# Patient Record
Sex: FEMALE | Race: WHITE | Employment: OTHER | URBAN - METROPOLITAN AREA
[De-identification: names, ages, dates, MRNs, and addresses within clinical notes are randomized per-mention and may not be internally consistent; named-entity substitution may affect disease eponyms.]

---

## 2020-07-16 ENCOUNTER — APPOINTMENT (OUTPATIENT)
Dept: GENERAL RADIOLOGY | Age: 70
DRG: 202 | End: 2020-07-16
Attending: EMERGENCY MEDICINE
Payer: MEDICARE

## 2020-07-16 ENCOUNTER — HOSPITAL ENCOUNTER (INPATIENT)
Age: 70
LOS: 4 days | Discharge: HOME | DRG: 202 | End: 2020-07-20
Attending: EMERGENCY MEDICINE | Admitting: INTERNAL MEDICINE
Payer: MEDICARE

## 2020-07-16 DIAGNOSIS — R05.9 COUGH: ICD-10-CM

## 2020-07-16 DIAGNOSIS — Z20.822 SUSPECTED COVID-19 VIRUS INFECTION: ICD-10-CM

## 2020-07-16 DIAGNOSIS — J18.9 COMMUNITY ACQUIRED PNEUMONIA, UNSPECIFIED LATERALITY: Primary | ICD-10-CM

## 2020-07-16 PROBLEM — R09.02 HYPOXIA: Status: ACTIVE | Noted: 2020-07-16

## 2020-07-16 LAB
ALBUMIN SERPL-MCNC: 3.7 G/DL (ref 3.5–5)
ALBUMIN/GLOB SERPL: 0.8 {RATIO} (ref 1.1–2.2)
ALP SERPL-CCNC: 106 U/L (ref 45–117)
ALT SERPL-CCNC: 44 U/L (ref 12–78)
ANION GAP SERPL CALC-SCNC: 7 MMOL/L (ref 5–15)
AST SERPL-CCNC: 25 U/L (ref 15–37)
B PERT DNA SPEC QL NAA+PROBE: NOT DETECTED
BASOPHILS # BLD: 0 K/UL (ref 0–0.1)
BASOPHILS NFR BLD: 0 % (ref 0–1)
BILIRUB SERPL-MCNC: 1.3 MG/DL (ref 0.2–1)
BORDETELLA PARAPERTUSSIS PCR, BORPAR: NOT DETECTED
BUN SERPL-MCNC: 9 MG/DL (ref 6–20)
BUN/CREAT SERPL: 12 (ref 12–20)
C PNEUM DNA SPEC QL NAA+PROBE: NOT DETECTED
CALCIUM SERPL-MCNC: 9 MG/DL (ref 8.5–10.1)
CHLORIDE SERPL-SCNC: 103 MMOL/L (ref 97–108)
CO2 SERPL-SCNC: 28 MMOL/L (ref 21–32)
COVID-19 RAPID TEST, COVR: NOT DETECTED
CREAT SERPL-MCNC: 0.77 MG/DL (ref 0.55–1.02)
CRP SERPL-MCNC: 18.7 MG/DL (ref 0–0.6)
D DIMER PPP FEU-MCNC: 0.55 MG/L FEU (ref 0–0.65)
DIFFERENTIAL METHOD BLD: ABNORMAL
EOSINOPHIL # BLD: 0.3 K/UL (ref 0–0.4)
EOSINOPHIL NFR BLD: 3 % (ref 0–7)
ERYTHROCYTE [DISTWIDTH] IN BLOOD BY AUTOMATED COUNT: 14.5 % (ref 11.5–14.5)
FERRITIN SERPL-MCNC: 189 NG/ML (ref 26–388)
FIBRINOGEN PPP-MCNC: 703 MG/DL (ref 200–475)
FLUAV H1 2009 PAND RNA SPEC QL NAA+PROBE: NOT DETECTED
FLUAV H1 RNA SPEC QL NAA+PROBE: NOT DETECTED
FLUAV H3 RNA SPEC QL NAA+PROBE: NOT DETECTED
FLUAV SUBTYP SPEC NAA+PROBE: NOT DETECTED
FLUBV RNA SPEC QL NAA+PROBE: NOT DETECTED
GLOBULIN SER CALC-MCNC: 4.9 G/DL (ref 2–4)
GLUCOSE BLD STRIP.AUTO-MCNC: 191 MG/DL (ref 65–100)
GLUCOSE SERPL-MCNC: 121 MG/DL (ref 65–100)
HADV DNA SPEC QL NAA+PROBE: NOT DETECTED
HCOV 229E RNA SPEC QL NAA+PROBE: NOT DETECTED
HCOV HKU1 RNA SPEC QL NAA+PROBE: NOT DETECTED
HCOV NL63 RNA SPEC QL NAA+PROBE: NOT DETECTED
HCOV OC43 RNA SPEC QL NAA+PROBE: NOT DETECTED
HCT VFR BLD AUTO: 36.7 % (ref 35–47)
HGB BLD-MCNC: 12.2 G/DL (ref 11.5–16)
HMPV RNA SPEC QL NAA+PROBE: NOT DETECTED
HPIV1 RNA SPEC QL NAA+PROBE: NOT DETECTED
HPIV2 RNA SPEC QL NAA+PROBE: NOT DETECTED
HPIV3 RNA SPEC QL NAA+PROBE: NOT DETECTED
HPIV4 RNA SPEC QL NAA+PROBE: NOT DETECTED
IMM GRANULOCYTES # BLD AUTO: 0.1 K/UL (ref 0–0.04)
IMM GRANULOCYTES NFR BLD AUTO: 1 % (ref 0–0.5)
INR PPP: 1.2 (ref 0.9–1.1)
LACTATE SERPL-SCNC: 1.1 MMOL/L (ref 0.4–2)
LDH SERPL L TO P-CCNC: 225 U/L (ref 81–246)
LYMPHOCYTES # BLD: 1.4 K/UL (ref 0.8–3.5)
LYMPHOCYTES NFR BLD: 14 % (ref 12–49)
M PNEUMO DNA SPEC QL NAA+PROBE: NOT DETECTED
MAGNESIUM SERPL-MCNC: 2.2 MG/DL (ref 1.6–2.4)
MCH RBC QN AUTO: 33.6 PG (ref 26–34)
MCHC RBC AUTO-ENTMCNC: 33.2 G/DL (ref 30–36.5)
MCV RBC AUTO: 101.1 FL (ref 80–99)
MONOCYTES # BLD: 0.6 K/UL (ref 0–1)
MONOCYTES NFR BLD: 7 % (ref 5–13)
NEUTS SEG # BLD: 7.3 K/UL (ref 1.8–8)
NEUTS SEG NFR BLD: 75 % (ref 32–75)
NRBC # BLD: 0 K/UL (ref 0–0.01)
NRBC BLD-RTO: 0 PER 100 WBC
PLATELET # BLD AUTO: 312 K/UL (ref 150–400)
PMV BLD AUTO: 9.9 FL (ref 8.9–12.9)
POTASSIUM SERPL-SCNC: 3.6 MMOL/L (ref 3.5–5.1)
PROCALCITONIN SERPL-MCNC: 0.13 NG/ML
PROT SERPL-MCNC: 8.6 G/DL (ref 6.4–8.2)
PROTHROMBIN TIME: 12.5 SEC (ref 9–11.1)
RBC # BLD AUTO: 3.63 M/UL (ref 3.8–5.2)
RSV RNA SPEC QL NAA+PROBE: NOT DETECTED
RV+EV RNA SPEC QL NAA+PROBE: DETECTED
SARS-COV-2, COV2: NOT DETECTED
SERVICE CMNT-IMP: ABNORMAL
SODIUM SERPL-SCNC: 138 MMOL/L (ref 136–145)
SOURCE, COVRS: NORMAL
SPECIMEN SOURCE, FCOV2M: NORMAL
SPECIMEN SOURCE, FCOV2M: NORMAL
TROPONIN I SERPL-MCNC: <0.05 NG/ML
WBC # BLD AUTO: 9.7 K/UL (ref 3.6–11)

## 2020-07-16 PROCEDURE — 74011000258 HC RX REV CODE- 258: Performed by: EMERGENCY MEDICINE

## 2020-07-16 PROCEDURE — 94640 AIRWAY INHALATION TREATMENT: CPT

## 2020-07-16 PROCEDURE — 85610 PROTHROMBIN TIME: CPT

## 2020-07-16 PROCEDURE — 87635 SARS-COV-2 COVID-19 AMP PRB: CPT

## 2020-07-16 PROCEDURE — 99285 EMERGENCY DEPT VISIT HI MDM: CPT

## 2020-07-16 PROCEDURE — 87040 BLOOD CULTURE FOR BACTERIA: CPT

## 2020-07-16 PROCEDURE — 36415 COLL VENOUS BLD VENIPUNCTURE: CPT

## 2020-07-16 PROCEDURE — 82962 GLUCOSE BLOOD TEST: CPT

## 2020-07-16 PROCEDURE — 0100U RESPIRATORY PANEL,PCR,NASOPHARYNGEAL: CPT

## 2020-07-16 PROCEDURE — 74011250637 HC RX REV CODE- 250/637: Performed by: EMERGENCY MEDICINE

## 2020-07-16 PROCEDURE — 84484 ASSAY OF TROPONIN QUANT: CPT

## 2020-07-16 PROCEDURE — 96375 TX/PRO/DX INJ NEW DRUG ADDON: CPT

## 2020-07-16 PROCEDURE — 85025 COMPLETE CBC W/AUTO DIFF WBC: CPT

## 2020-07-16 PROCEDURE — 83735 ASSAY OF MAGNESIUM: CPT

## 2020-07-16 PROCEDURE — 74011250636 HC RX REV CODE- 250/636: Performed by: EMERGENCY MEDICINE

## 2020-07-16 PROCEDURE — 83605 ASSAY OF LACTIC ACID: CPT

## 2020-07-16 PROCEDURE — 85379 FIBRIN DEGRADATION QUANT: CPT

## 2020-07-16 PROCEDURE — 65270000029 HC RM PRIVATE

## 2020-07-16 PROCEDURE — 96365 THER/PROPH/DIAG IV INF INIT: CPT

## 2020-07-16 PROCEDURE — 84145 PROCALCITONIN (PCT): CPT

## 2020-07-16 PROCEDURE — 96366 THER/PROPH/DIAG IV INF ADDON: CPT

## 2020-07-16 PROCEDURE — 82728 ASSAY OF FERRITIN: CPT

## 2020-07-16 PROCEDURE — 83615 LACTATE (LD) (LDH) ENZYME: CPT

## 2020-07-16 PROCEDURE — 85384 FIBRINOGEN ACTIVITY: CPT

## 2020-07-16 PROCEDURE — 74011250637 HC RX REV CODE- 250/637: Performed by: INTERNAL MEDICINE

## 2020-07-16 PROCEDURE — 71045 X-RAY EXAM CHEST 1 VIEW: CPT

## 2020-07-16 PROCEDURE — 80053 COMPREHEN METABOLIC PANEL: CPT

## 2020-07-16 PROCEDURE — 93005 ELECTROCARDIOGRAM TRACING: CPT

## 2020-07-16 PROCEDURE — 86140 C-REACTIVE PROTEIN: CPT

## 2020-07-16 PROCEDURE — 74011250636 HC RX REV CODE- 250/636: Performed by: INTERNAL MEDICINE

## 2020-07-16 RX ORDER — VALSARTAN 80 MG/1
80 TABLET ORAL DAILY
COMMUNITY

## 2020-07-16 RX ORDER — ACETAMINOPHEN 325 MG/1
650 TABLET ORAL
Status: DISCONTINUED | OUTPATIENT
Start: 2020-07-16 | End: 2020-07-20 | Stop reason: HOSPADM

## 2020-07-16 RX ORDER — ONDANSETRON 2 MG/ML
4 INJECTION INTRAMUSCULAR; INTRAVENOUS
Status: DISCONTINUED | OUTPATIENT
Start: 2020-07-16 | End: 2020-07-20 | Stop reason: HOSPADM

## 2020-07-16 RX ORDER — ENOXAPARIN SODIUM 100 MG/ML
40 INJECTION SUBCUTANEOUS EVERY 24 HOURS
Status: DISCONTINUED | OUTPATIENT
Start: 2020-07-17 | End: 2020-07-20 | Stop reason: HOSPADM

## 2020-07-16 RX ORDER — MAGNESIUM SULFATE 100 %
4 CRYSTALS MISCELLANEOUS AS NEEDED
Status: DISCONTINUED | OUTPATIENT
Start: 2020-07-16 | End: 2020-07-20 | Stop reason: HOSPADM

## 2020-07-16 RX ORDER — SODIUM CHLORIDE 0.9 % (FLUSH) 0.9 %
5-40 SYRINGE (ML) INJECTION EVERY 8 HOURS
Status: DISCONTINUED | OUTPATIENT
Start: 2020-07-16 | End: 2020-07-20 | Stop reason: HOSPADM

## 2020-07-16 RX ORDER — VALSARTAN 80 MG/1
80 TABLET ORAL DAILY
Status: DISCONTINUED | OUTPATIENT
Start: 2020-07-17 | End: 2020-07-16

## 2020-07-16 RX ORDER — OXYBUTYNIN CHLORIDE 5 MG/1
5 TABLET, EXTENDED RELEASE ORAL DAILY
COMMUNITY

## 2020-07-16 RX ORDER — AZITHROMYCIN 250 MG/1
250 TABLET, FILM COATED ORAL DAILY
Status: DISCONTINUED | OUTPATIENT
Start: 2020-07-17 | End: 2020-07-16

## 2020-07-16 RX ORDER — VENLAFAXINE HYDROCHLORIDE 75 MG/1
75 CAPSULE, EXTENDED RELEASE ORAL DAILY
COMMUNITY

## 2020-07-16 RX ORDER — AZITHROMYCIN 250 MG/1
500 TABLET, FILM COATED ORAL
Status: COMPLETED | OUTPATIENT
Start: 2020-07-16 | End: 2020-07-16

## 2020-07-16 RX ORDER — ARIPIPRAZOLE 5 MG/1
10 TABLET ORAL DAILY
Status: DISCONTINUED | OUTPATIENT
Start: 2020-07-17 | End: 2020-07-20 | Stop reason: HOSPADM

## 2020-07-16 RX ORDER — SODIUM CHLORIDE 0.9 % (FLUSH) 0.9 %
5-40 SYRINGE (ML) INJECTION AS NEEDED
Status: DISCONTINUED | OUTPATIENT
Start: 2020-07-16 | End: 2020-07-20 | Stop reason: HOSPADM

## 2020-07-16 RX ORDER — CITALOPRAM 20 MG/1
20 TABLET, FILM COATED ORAL DAILY
COMMUNITY

## 2020-07-16 RX ORDER — VENLAFAXINE HYDROCHLORIDE 37.5 MG/1
75 CAPSULE, EXTENDED RELEASE ORAL
Status: DISCONTINUED | OUTPATIENT
Start: 2020-07-17 | End: 2020-07-20 | Stop reason: HOSPADM

## 2020-07-16 RX ORDER — DEXAMETHASONE 4 MG/1
6 TABLET ORAL DAILY
Status: DISCONTINUED | OUTPATIENT
Start: 2020-07-16 | End: 2020-07-19

## 2020-07-16 RX ORDER — LEVOTHYROXINE SODIUM 75 UG/1
75 TABLET ORAL
Status: DISCONTINUED | OUTPATIENT
Start: 2020-07-17 | End: 2020-07-20 | Stop reason: HOSPADM

## 2020-07-16 RX ORDER — CITALOPRAM 20 MG/1
20 TABLET, FILM COATED ORAL DAILY
Status: DISCONTINUED | OUTPATIENT
Start: 2020-07-17 | End: 2020-07-20 | Stop reason: HOSPADM

## 2020-07-16 RX ORDER — ZINC SULFATE 50(220)MG
220 CAPSULE ORAL DAILY
Status: DISCONTINUED | OUTPATIENT
Start: 2020-07-17 | End: 2020-07-19

## 2020-07-16 RX ORDER — LOSARTAN POTASSIUM 50 MG/1
50 TABLET ORAL DAILY
Status: DISCONTINUED | OUTPATIENT
Start: 2020-07-17 | End: 2020-07-19

## 2020-07-16 RX ORDER — DEXTROSE 50 % IN WATER (D50W) INTRAVENOUS SYRINGE
12.5-25 AS NEEDED
Status: DISCONTINUED | OUTPATIENT
Start: 2020-07-16 | End: 2020-07-20 | Stop reason: HOSPADM

## 2020-07-16 RX ORDER — OXYBUTYNIN CHLORIDE 5 MG/1
5 TABLET, EXTENDED RELEASE ORAL DAILY
Status: DISCONTINUED | OUTPATIENT
Start: 2020-07-17 | End: 2020-07-20 | Stop reason: HOSPADM

## 2020-07-16 RX ORDER — METFORMIN HYDROCHLORIDE 500 MG/1
500 TABLET ORAL
Status: DISCONTINUED | OUTPATIENT
Start: 2020-07-17 | End: 2020-07-19

## 2020-07-16 RX ORDER — ONDANSETRON 2 MG/ML
4 INJECTION INTRAMUSCULAR; INTRAVENOUS ONCE
Status: COMPLETED | OUTPATIENT
Start: 2020-07-16 | End: 2020-07-16

## 2020-07-16 RX ORDER — AZITHROMYCIN 250 MG/1
250 TABLET, FILM COATED ORAL DAILY
Status: COMPLETED | OUTPATIENT
Start: 2020-07-17 | End: 2020-07-20

## 2020-07-16 RX ORDER — METHOCARBAMOL 500 MG/1
TABLET, FILM COATED ORAL 3 TIMES DAILY
COMMUNITY

## 2020-07-16 RX ORDER — ALBUTEROL SULFATE 90 UG/1
8 AEROSOL, METERED RESPIRATORY (INHALATION)
Status: COMPLETED | OUTPATIENT
Start: 2020-07-16 | End: 2020-07-17

## 2020-07-16 RX ORDER — METFORMIN HYDROCHLORIDE 500 MG/1
500 TABLET ORAL
COMMUNITY

## 2020-07-16 RX ORDER — LEVOTHYROXINE SODIUM 75 UG/1
75 TABLET ORAL
COMMUNITY

## 2020-07-16 RX ORDER — INSULIN LISPRO 100 [IU]/ML
INJECTION, SOLUTION INTRAVENOUS; SUBCUTANEOUS
Status: DISCONTINUED | OUTPATIENT
Start: 2020-07-16 | End: 2020-07-20 | Stop reason: HOSPADM

## 2020-07-16 RX ORDER — OXCARBAZEPINE 300 MG/1
300 TABLET, FILM COATED ORAL 2 TIMES DAILY
Status: DISCONTINUED | OUTPATIENT
Start: 2020-07-16 | End: 2020-07-20 | Stop reason: HOSPADM

## 2020-07-16 RX ORDER — ARIPIPRAZOLE 10 MG/1
10 TABLET ORAL DAILY
COMMUNITY

## 2020-07-16 RX ORDER — OXCARBAZEPINE 300 MG/1
300 TABLET, FILM COATED ORAL 2 TIMES DAILY
COMMUNITY

## 2020-07-16 RX ORDER — ASCORBIC ACID 500 MG
250 TABLET ORAL 2 TIMES DAILY
Status: DISCONTINUED | OUTPATIENT
Start: 2020-07-16 | End: 2020-07-19

## 2020-07-16 RX ORDER — METHOCARBAMOL 500 MG/1
500 TABLET, FILM COATED ORAL 3 TIMES DAILY
Status: DISCONTINUED | OUTPATIENT
Start: 2020-07-16 | End: 2020-07-20 | Stop reason: HOSPADM

## 2020-07-16 RX ADMIN — Medication 10 ML: at 18:14

## 2020-07-16 RX ADMIN — SODIUM CHLORIDE 1000 ML: 900 INJECTION, SOLUTION INTRAVENOUS at 13:18

## 2020-07-16 RX ADMIN — CEFTRIAXONE SODIUM 1 G: 1 INJECTION, POWDER, FOR SOLUTION INTRAMUSCULAR; INTRAVENOUS at 14:22

## 2020-07-16 RX ADMIN — AZITHROMYCIN MONOHYDRATE 500 MG: 250 TABLET ORAL at 14:22

## 2020-07-16 RX ADMIN — METHOCARBAMOL TABLETS 500 MG: 500 TABLET, COATED ORAL at 22:24

## 2020-07-16 RX ADMIN — DEXAMETHASONE 6 MG: 4 TABLET ORAL at 18:13

## 2020-07-16 RX ADMIN — OXCARBAZEPINE 300 MG: 300 TABLET, FILM COATED ORAL at 18:14

## 2020-07-16 RX ADMIN — Medication 10 ML: at 22:24

## 2020-07-16 RX ADMIN — ONDANSETRON 4 MG: 2 INJECTION INTRAMUSCULAR; INTRAVENOUS at 13:18

## 2020-07-16 RX ADMIN — ALBUTEROL SULFATE 8 PUFF: 90 AEROSOL, METERED RESPIRATORY (INHALATION) at 12:54

## 2020-07-16 RX ADMIN — OXYCODONE HYDROCHLORIDE AND ACETAMINOPHEN 250 MG: 500 TABLET ORAL at 18:14

## 2020-07-16 NOTE — ACP (ADVANCE CARE PLANNING)
Advance Care Planning Note      NAME: Shanta Rea   :  1950   MRN:  [id number]     Date/Time:  2020 5:28 PM    Active Diagnoses:  Hospital Problems  Never Reviewed          Codes Class Noted POA    Hypoxia ICD-10-CM: R09.02  ICD-9-CM: 799.02  2020 Unknown              These active diagnoses are of sufficient risk that focused discussion on advance care planning is indicated in order to allow the patient to thoughtfully consider personal goals of care, and if situations arise that prevent the ability to personally give input, to ensure appropriate representation of their personal desires for different levels and aggressiveness of care. Discussion:   Code status addressed and wants to be a Full Code. Patient wants central line and vasopressors if needed. Patient would also want a feeding tube, if needed, for nutritional support. Patient  would like to assign Elder Thomas  as the surrogate decision maker. Persons present and participating in discussion: Jesus Mcdaniels MD      Time Spent:   Total time spent face-to-face in education and discussion:   16  minutes.          Jesus Edwards MD   Hospitalist

## 2020-07-16 NOTE — ED NOTES
Bedside and Verbal shift change report given to Clemente Bland RN (oncoming nurse) by Elaina Ortiz RN (offgoing nurse). Report included the following information SBAR, ED Summary, MAR and Recent Results.

## 2020-07-16 NOTE — ED NOTES
PT desat down to 85% after receiving 500 cc of 1000cc bolus.   Dr. Ilana Adamson notified, pt started on 2L NC, bolus stopped, return to 95% saturation on 2L NC.

## 2020-07-16 NOTE — ED PROVIDER NOTES
EMERGENCY DEPARTMENT HISTORY AND PHYSICAL EXAM      Date: 7/16/2020  Patient Name: Julio Rosales    History of Presenting Illness     Chief Complaint   Patient presents with    Shortness of Breath     Pt arrived to ED for SOB, dry heaving, coughing since 7/4/2020. Pt traveled to [facility].  Fever       History Provided By: Patient    HPI: Julio Rosales, 78 y.o. female with PMHx significant for DM, HTN, XOL, and h/o asthma as a child presents to the ED with cc of dyspnea, cough and fever. Pt states that she traveled to [place] of July weekend with her family. They stayed in a condo and kept to themselves other than going to the grocery store a few times. Pt wore her mask to the grocery store, stating that many others were not wearing masks. She noted on July 6th that she developed some rhinorrhea, followed by a sore throat and a fever. Tmax recently only about 100. The symptoms then moved into her chest, she developed some coughing (non productive) and progressibely worsening dyspnea to the point she sought medical attention today. She denies anyone else in the family being sick at this time and no close contact with a KNOWN individual prior diagnosis of COVID-19. No other complaints at this time. There are no other complaints, changes, or physical findings at this time. PCP: Unknown, Provider    No current facility-administered medications on file prior to encounter. Current Outpatient Medications on File Prior to Encounter   Medication Sig Dispense Refill    methocarbamoL (ROBAXIN) 500 mg tablet Take  by mouth three (3) times daily.  ARIPiprazole (ABILIFY) 10 mg tablet Take 10 mg by mouth daily.  metFORMIN (GLUCOPHAGE) 500 mg tablet Take 500 mg by mouth daily (with breakfast).  OXcarbazepine (TRILEPTAL) 300 mg tablet Take 300 mg by mouth two (2) times a day.  venlafaxine-SR (EFFEXOR-XR) 75 mg capsule Take 75 mg by mouth daily.       levothyroxine (SYNTHROID) 75 mcg tablet Take 75 mcg by mouth Daily (before breakfast).  citalopram (CELEXA) 20 mg tablet Take 20 mg by mouth daily.  valsartan (DIOVAN) 80 mg tablet Take 80 mg by mouth daily.  oxybutynin chloride XL (DITROPAN XL) 5 mg CR tablet Take 5 mg by mouth daily. Past History     Past Medical History:  Past Medical History:   Diagnosis Date    Asthma     Bipolar 1 disorder ([address]     Pneumonia        Past Surgical History:  Past Surgical History:   Procedure Laterality Date    HX ORTHOPAEDIC         Family History:  History reviewed. No pertinent family history. Social History:  Social History     Tobacco Use    Smoking status: Never Smoker    Smokeless tobacco: Never Used   Substance Use Topics    Alcohol use: Not on file    Drug use: Not on file       Allergies: Allergies   Allergen Reactions    Nexium [Esomeprazole Magnesium] Other (comments)         Review of Systems   Review of Systems   Constitutional: Positive for fever. Negative for chills. HENT: Positive for postnasal drip, rhinorrhea and sore throat. Negative for nosebleeds. Eyes: Negative for pain and visual disturbance. Respiratory: Positive for cough, shortness of breath and wheezing. Cardiovascular: Negative for chest pain, palpitations and leg swelling. Gastrointestinal: Positive for nausea. Negative for abdominal pain, diarrhea and vomiting. Genitourinary: Negative for dysuria and hematuria. Musculoskeletal: Negative for back pain and neck pain. Skin: Negative for color change and rash. Neurological: Negative for weakness and headaches. Psychiatric/Behavioral: Negative for suicidal ideas. The patient is not nervous/anxious. All other systems reviewed and are negative. Physical Exam   Physical Exam  Vitals signs and nursing note reviewed. Constitutional:       Appearance: Normal appearance. HENT:      Head: Normocephalic and atraumatic.       Nose: Nose normal.      Mouth/Throat:      Mouth: Mucous membranes are moist.      Pharynx: Oropharynx is clear. Eyes:      Extraocular Movements: Extraocular movements intact. Pupils: Pupils are equal, round, and reactive to light. Neck:      Musculoskeletal: Normal range of motion and neck supple. No neck rigidity. Cardiovascular:      Rate and Rhythm: Normal rate and regular rhythm. Heart sounds: Normal heart sounds. Pulmonary:      Effort: Tachypnea and respiratory distress (mild) present. Breath sounds: Examination of the right-lower field reveals wheezing and rales. Examination of the left-lower field reveals wheezing and rales. Wheezing and rales present. Chest:      Chest wall: No edema. Abdominal:      Palpations: Abdomen is soft. Tenderness: There is no abdominal tenderness. There is no guarding or rebound. Musculoskeletal:         General: No deformity. Skin:     General: Skin is warm and dry. Findings: No rash. Neurological:      General: No focal deficit present. Mental Status: She is alert and oriented to person, place, and time.    Psychiatric:         Mood and Affect: Mood normal.         Behavior: Behavior normal.         Diagnostic Study Results     Labs -     Recent Results (from the past 24 hour(s))   EKG, 12 LEAD, INITIAL    Collection Time: 07/16/20 12:11 PM   Result Value Ref Range    Ventricular Rate 87 BPM    Atrial Rate 87 BPM    P-R Interval 164 ms    QRS Duration 88 ms    Q-T Interval 368 ms    QTC Calculation (Bezet) 442 ms    Calculated P Axis 7 degrees    Calculated R Axis 7 degrees    Calculated T Axis 74 degrees    Diagnosis       Normal sinus rhythm  Normal ECG  No previous ECGs available     CBC WITH AUTOMATED DIFF    Collection Time: 07/16/20 12:25 PM   Result Value Ref Range    WBC 9.7 3.6 - 11.0 K/uL    RBC 3.63 (L) 3.80 - 5.20 M/uL    HGB 12.2 11.5 - 16.0 g/dL    HCT 36.7 35.0 - 47.0 %    .1 (H) 80.0 - 99.0 FL    MCH 33.6 26.0 - 34.0 PG    MCHC 33.2 30.0 - 36.5 g/dL    RDW 14.5 11.5 - 14.5 % PLATELET 100 387 - 070 K/uL    MPV 9.9 8.9 - 12.9 FL    NRBC 0.0 0  WBC    ABSOLUTE NRBC 0.00 0.00 - 0.01 K/uL    NEUTROPHILS 75 32 - 75 %    LYMPHOCYTES 14 12 - 49 %    MONOCYTES 7 5 - 13 %    EOSINOPHILS 3 0 - 7 %    BASOPHILS 0 0 - 1 %    IMMATURE GRANULOCYTES 1 (H) 0.0 - 0.5 %    ABS. NEUTROPHILS 7.3 1.8 - 8.0 K/UL    ABS. LYMPHOCYTES 1.4 0.8 - 3.5 K/UL    ABS. MONOCYTES 0.6 0.0 - 1.0 K/UL    ABS. EOSINOPHILS 0.3 0.0 - 0.4 K/UL    ABS. BASOPHILS 0.0 0.0 - 0.1 K/UL    ABS. IMM.  GRANS. 0.1 (H) 0.00 - 0.04 K/UL    DF AUTOMATED     FERRITIN    Collection Time: 07/16/20 12:25 PM   Result Value Ref Range    Ferritin 189 26 - 388 NG/ML   LACTIC ACID    Collection Time: 07/16/20 12:25 PM   Result Value Ref Range    Lactic acid 1.1 0.4 - 2.0 MMOL/L   D DIMER    Collection Time: 07/16/20 12:25 PM   Result Value Ref Range    D-dimer 0.55 0.00 - 0.65 mg/L FEU   PROTHROMBIN TIME + INR    Collection Time: 07/16/20 12:25 PM   Result Value Ref Range    INR 1.2 (H) 0.9 - 1.1      Prothrombin time 12.5 (H) 9.0 - 11.1 sec   FIBRINOGEN    Collection Time: 07/16/20 12:25 PM   Result Value Ref Range    Fibrinogen 703 (H) 200 - 475 mg/dL   LD    Collection Time: 07/16/20 12:27 PM   Result Value Ref Range     81 - 246 U/L   C REACTIVE PROTEIN, QT    Collection Time: 07/16/20 12:27 PM   Result Value Ref Range    C-Reactive protein 18.70 (H) 0.00 - 0.60 mg/dL   PROCALCITONIN    Collection Time: 07/16/20 12:27 PM   Result Value Ref Range    Procalcitonin 0.13 ng/mL   TROPONIN I    Collection Time: 07/16/20 12:27 PM   Result Value Ref Range    Troponin-I, Qt. <0.05 <0.05 ng/mL   MAGNESIUM    Collection Time: 07/16/20 12:27 PM   Result Value Ref Range    Magnesium 2.2 1.6 - 2.4 mg/dL   METABOLIC PANEL, COMPREHENSIVE    Collection Time: 07/16/20 12:27 PM   Result Value Ref Range    Sodium 138 136 - 145 mmol/L    Potassium 3.6 3.5 - 5.1 mmol/L    Chloride 103 97 - 108 mmol/L    CO2 28 21 - 32 mmol/L    Anion gap 7 5 - 15 mmol/L Glucose 121 (H) 65 - 100 mg/dL    BUN 9 6 - 20 MG/DL    Creatinine 0.77 0.55 - 1.02 MG/DL    BUN/Creatinine ratio 12 12 - 20      GFR est AA >60 >60 ml/min/1.73m2    GFR est non-AA >60 >60 ml/min/1.73m2    Calcium 9.0 8.5 - 10.1 MG/DL    Bilirubin, total 1.3 (H) 0.2 - 1.0 MG/DL    ALT (SGPT) 44 12 - 78 U/L    AST (SGOT) 25 15 - 37 U/L    Alk. phosphatase 106 45 - 117 U/L    Protein, total 8.6 (H) 6.4 - 8.2 g/dL    Albumin 3.7 3.5 - 5.0 g/dL    Globulin 4.9 (H) 2.0 - 4.0 g/dL    A-G Ratio 0.8 (L) 1.1 - 2.2     SARS-COV-2    Collection Time: 07/16/20  2:32 PM   Result Value Ref Range    Specimen source Nasopharyngeal      Specimen source Nasopharyngeal      COVID-19 rapid test Not detected NOTD         Radiologic Studies -   XR CHEST PORT   Final Result   IMPRESSION:      Possible interstitial lung disease. In the acute setting, consider edema or   interstitial pneumonia. No classic pattern of a specific pneumonia. Recommend followup PA and lateral chest views in 8-10 weeks to ensure   resolution. CT Results  (Last 48 hours)    None        CXR Results  (Last 48 hours)               07/16/20 1327  XR CHEST PORT Final result    Impression:  IMPRESSION:       Possible interstitial lung disease. In the acute setting, consider edema or   interstitial pneumonia. No classic pattern of a specific pneumonia. Recommend followup PA and lateral chest views in 8-10 weeks to ensure   resolution. Narrative:  EXAM: XR CHEST PORT       INDICATION: Cough. Chronic asthma. Normal white blood cell count. COMPARISON: None       TECHNIQUE: Upright portable chest AP view       FINDINGS: Cardiac monitoring wires overlie the thorax. The cardiomediastinal and   hilar contours are within normal limits. The pulmonary vasculature is within   normal limits. Reticular interstitial opacities may be accentuated by portable technique and   low lung volumes. No lobar pneumonia. No pneumothorax.  The visualized bones and   upper abdomen are age-appropriate. Medical Decision Making   I am the first provider for this patient. I reviewed the vital signs, available nursing notes, past medical history, past surgical history, family history and social history. Vital Signs-Reviewed the patient's vital signs. Patient Vitals for the past 24 hrs:   Pulse Resp BP SpO2   07/16/20 1615 -- -- -- 97 %   07/16/20 1600 -- -- (!) 165/98 94 %   07/16/20 1545 -- -- -- 92 %   07/16/20 1530 -- -- 166/85 94 %   07/16/20 1515 -- -- -- 97 %   07/16/20 1500 -- -- 163/85 97 %   07/16/20 1445 -- -- -- 97 %   07/16/20 1430 -- -- 156/78 99 %   07/16/20 1415 -- -- -- 97 %   07/16/20 1400 -- -- (!) 156/93 98 %   07/16/20 1345 -- -- -- 95 %   07/16/20 1330 -- -- (!) 158/102 90 %   07/16/20 1315 -- -- -- 93 %   07/16/20 1300 -- -- 167/81 95 %   07/16/20 1254 -- -- -- 96 %   07/16/20 1245 -- -- -- 95 %   07/16/20 1230 -- -- 168/84 95 %   07/16/20 1215 -- -- 160/82 93 %   07/16/20 1200 -- -- 151/88 94 %   07/16/20 1145 -- -- 151/83 94 %   07/16/20 1130 -- -- 173/85 96 %   07/16/20 1121 99 20 148/82 97 %       Pulse Oximetry Analysis - 97% on RA    Cardiac Monitor:   Rate: 99 bpm  Rhythm: Normal Sinus Rhythm        Records Reviewed: Nursing Notes and Old Medical Records    Differential Diagnosis/MDM:    DDX includes COVID-19 infection versus pneumonia versus CHF versus PE versus acute viral illness with asthma exacerbation. Will obtain labs, blood cultures, chest x-ray. Will give albuterol inhaler, IV fluids. Patient not requiring oxygen. Will reassess pending work-up. ED Course:     Initial assessment performed. The patients presenting problems have been discussed, and they are in agreement with the care plan formulated and outlined with them. I have encouraged them to ask questions as they arise throughout their visit.     ED Course as of Jul 16 1756   Thu Jul 16, 2020   1337 Patient initially was with SPO2 of 93 upon arrival.  After starting one first liter fluid and receiving approximately 500 cc of fluid, patient is now satting 85% on room air. She was placed on oxygen and the fluid bolus was stopped. SpO2 improved. [SC]   5940 CXR concerning for possible interstitial pneumonia versus pulmonary edema. Patient clinically more consistent with pneumonia and possible infection with COVID-19 given history of recent travel to [place], fevers up to 100, rhinorrhea and sore throat prior to shortness of breath and coughing that began. COVID test is ordered, started on ceftriaxone and azithromycin. Patient does state that albuterol helped her coughing and breathing to some degree. Given acute hypoxic event will admit to hospitalist service for further management.    [SC]      ED Course User Index  [SC] Jann Yun MD       Critical Care Time:     None    Disposition:  Admit    PLAN:  1. Current Discharge Medication List        2. Follow-up Information    None       Return to ED if worse     Diagnosis     Clinical Impression:   1. Community acquired pneumonia, unspecified laterality    2.  Suspected COVID-19 virus infection

## 2020-07-16 NOTE — H&P
Hospitalist Admission Note    NAME: Kathy Cain   :  1950   MRN:  [id number]     Date/Time:  2020 5:18 PM    Patient PCP: Unknown, Provider  ______________________________________________________________________  Given the patient's current clinical presentation, I have a high level of concern for decompensation if discharged from the emergency department. Complex decision making was performed, which includes reviewing the patient's available past medical records, laboratory results, and x-ray films. My assessment of this patient's clinical condition and my plan of care is as follows. Assessment / Plan:  Interstitial pneumonia POA/rule out COVID-19:  -Recent travel to [place]  -1 week history of generalized aches and pains, intermittent diarrhea and respiratory difficulty  -Chest x-ray shows interstitial infiltrates suspicious of COVID-19 infection  -Admit to the medical floor droplet plus isolation  -Rapid COVID-19 test is negative  -We will obtain COVID-19 PCR testing  -Start ceftriaxone and azithromycin empirically for now as procalcitonin is elevated  -Start Decadron 6 mg daily as patient is requiring oxygen in the ED  -If COVID-19 PCR test comes back positive will obtain infectious disease consult for the treatment of COVID-19 with Remdesvir    Non-insulin-dependent diabetes:  -Continue metformin  -Use sliding scale insulin    Hypertension:  -Continue valsartan    Hypothyroidism;  -Continue levothyroxine      Bipolar disorder;  -Continue home medication      Code Status: Full code  Surrogate Decision Maker: Son at [facility]    DVT Prophylaxis: Lovenox  GI Prophylaxis: not indicated    Baseline: Independent      Subjective:   CHIEF COMPLAINT: Shortness of breath/malaise and diarrhea for 1 week  HISTORY OF PRESENT ILLNESS:     Kathy Cain is a 78 y.o.    female with past medical history significant for bipolar disorder, non-insulin-dependent diabetes, hypothyroidism and hypertension who presents with the complaints of generalized aches and pains, intermittent diarrhea and shortness of breath for 1 week. Patient reports she went to [place] on 4 July and returned on Saturday. She reports for the last 1 week she has been having the symptoms of generalized aches and pains with one episode of loose bowel movement every day and progressive shortness of breath. She denies any nausea or vomiting. Reports subjective fever and chills. Denies anybody else at home who is sick with similar symptoms. In the ED she had a chest x-ray done which showed interstitial infiltrates. She denies any orthopnea, PND but reports intermittent cough. She also denies any chest pain, urine urgency frequency dysuria, heat or cold intolerance    We were asked to admit for work up and evaluation of the above problems. Past Medical History:   Diagnosis Date    Asthma     Bipolar 1 disorder ([address]     Pneumonia         Past Surgical History:   Procedure Laterality Date    HX ORTHOPAEDIC         Social History     Tobacco Use    Smoking status: Never Smoker    Smokeless tobacco: Never Used   Substance Use Topics    Alcohol use: Not on file        History reviewed. No pertinent family history. Significant for heart disease in parents  Allergies   Allergen Reactions    Nexium [Esomeprazole Magnesium] Other (comments)        Prior to Admission medications    Medication Sig Start Date End Date Taking? Authorizing Provider   methocarbamoL (ROBAXIN) 500 mg tablet Take  by mouth three (3) times daily. Yes Inés, MD Agnieszka   ARIPiprazole (ABILIFY) 10 mg tablet Take 10 mg by mouth daily. Yes Other, MD Agnieszka   metFORMIN (GLUCOPHAGE) 500 mg tablet Take 500 mg by mouth daily (with breakfast). Yes Inés, MD Agnieszka   OXcarbazepine (TRILEPTAL) 300 mg tablet Take 300 mg by mouth two (2) times a day. Yes Other, MD Agnieszka   venlafaxine-SR [facility] 75 mg capsule Take 75 mg by mouth daily.    Yes Other, MD Agnieszka   levothyroxine (SYNTHROID) 75 mcg tablet Take 75 mcg by mouth Daily (before breakfast). Yes Other, MD Agnieszka   citalopram (CELEXA) 20 mg tablet Take 20 mg by mouth daily. Yes Inés, MD Agnieszka   valsartan (DIOVAN) 80 mg tablet Take 80 mg by mouth daily. Yes Inés, MD Agnieszka   oxybutynin chloride XL (DITROPAN XL) 5 mg CR tablet Take 5 mg by mouth daily. Yes Other, MD Agnieszka       REVIEW OF SYSTEMS:     I am not able to complete the review of systems because:    The patient is intubated and sedated    The patient has altered mental status due to his acute medical problems    The patient has baseline aphasia from prior stroke(s)    The patient has baseline dementia and is not reliable historian    The patient is in acute medical distress and unable to provide information           Total of 12 systems reviewed as follows:       POSITIVE= underlined text  Negative = text not underlined  General:  fever, chills, sweats, generalized weakness, weight loss/gain,      loss of appetite   Eyes:    blurred vision, eye pain, loss of vision, double vision  ENT:    rhinorrhea, pharyngitis   Respiratory:   cough, sputum production, SOB, STAFFORD, wheezing, pleuritic pain   Cardiology:   chest pain, palpitations, orthopnea, PND, edema, syncope   Gastrointestinal:  abdominal pain , N/V, diarrhea, dysphagia, constipation, bleeding   Genitourinary:  frequency, urgency, dysuria, hematuria, incontinence   Muskuloskeletal :  arthralgia, myalgia, back pain  Hematology:  easy bruising, nose or gum bleeding, lymphadenopathy   Dermatological: rash, ulceration, pruritis, color change / jaundice  Endocrine:   hot flashes or polydipsia   Neurological:  headache, dizziness, confusion, focal weakness, paresthesia,     Speech difficulties, memory loss, gait difficulty  Psychological: Feelings of anxiety, depression, agitation    Objective:   VITALS:    Visit Vitals  BP (!) 165/98   Pulse 99   Resp 20   Ht 5' 8\" (1.727 m)   Wt 117.9 kg (260 lb)   SpO2 97%   BMI 39.53 kg/m²       PHYSICAL EXAM:    General:    Alert, cooperative, no distress, appears stated age. HEENT: Atraumatic, anicteric sclerae, pink conjunctivae     No oral ulcers, mucosa moist, throat clear, dentition fair  Neck:  Supple, symmetrical,  thyroid: non tender  Lungs:   Clear to auscultation bilaterally. No Wheezing or Rhonchi. No rales. Chest wall:  No tenderness  No Accessory muscle use. Heart:   Regular  rhythm,  No  murmur   No edema  Abdomen:   Soft, non-tender. Not distended. Bowel sounds normal  Extremities: No cyanosis. No clubbing,      Skin turgor normal, Capillary refill normal, Radial dial pulse 2+  Skin:     Not pale. Not Jaundiced  No rashes   Psych:  Good insight. Not depressed. Not anxious or agitated. Neurologic: EOMs intact. No facial asymmetry. No aphasia or slurred speech. Symmetrical strength, Sensation grossly intact. Alert and oriented X 4.     _______________________________________________________________________  Care Plan discussed with:    Comments   Patient x    Family      RN     Care Manager                    Consultant:      _______________________________________________________________________  Expected  Disposition:   Home with Family x   HH/PT/OT/RN    SNF/LTC    RAHEEM    ________________________________________________________________________  TOTAL TIME:  45 Minutes    Critical Care Provided     Minutes non procedure based      Comments     Reviewed previous records   >50% of visit spent in counseling and coordination of care  Discussion with patient and/or family and questions answered       ________________________________________________________________________  Signed: Angelica Jaramillo MD    Procedures: see electronic medical records for all procedures/Xrays and details which were not copied into this note but were reviewed prior to creation of Plan.     LAB DATA REVIEWED:    Recent Results (from the past 24 hour(s))   EKG, 12 LEAD, INITIAL    Collection Time: 07/16/20 12:11 PM   Result Value Ref Range    Ventricular Rate 87 BPM    Atrial Rate 87 BPM    P-R Interval 164 ms    QRS Duration 88 ms    Q-T Interval 368 ms    QTC Calculation (Bezet) 442 ms    Calculated P Axis 7 degrees    Calculated R Axis 7 degrees    Calculated T Axis 74 degrees    Diagnosis       Normal sinus rhythm  Normal ECG  No previous ECGs available     CBC WITH AUTOMATED DIFF    Collection Time: 07/16/20 12:25 PM   Result Value Ref Range    WBC 9.7 3.6 - 11.0 K/uL    RBC 3.63 (L) 3.80 - 5.20 M/uL    HGB 12.2 11.5 - 16.0 g/dL    HCT 36.7 35.0 - 47.0 %    .1 (H) 80.0 - 99.0 FL    MCH 33.6 26.0 - 34.0 PG    MCHC 33.2 30.0 - 36.5 g/dL    RDW 14.5 11.5 - 14.5 %    PLATELET 633 588 - 520 K/uL    MPV 9.9 8.9 - 12.9 FL    NRBC 0.0 0  WBC    ABSOLUTE NRBC 0.00 0.00 - 0.01 K/uL    NEUTROPHILS 75 32 - 75 %    LYMPHOCYTES 14 12 - 49 %    MONOCYTES 7 5 - 13 %    EOSINOPHILS 3 0 - 7 %    BASOPHILS 0 0 - 1 %    IMMATURE GRANULOCYTES 1 (H) 0.0 - 0.5 %    ABS. NEUTROPHILS 7.3 1.8 - 8.0 K/UL    ABS. LYMPHOCYTES 1.4 0.8 - 3.5 K/UL    ABS. MONOCYTES 0.6 0.0 - 1.0 K/UL    ABS. EOSINOPHILS 0.3 0.0 - 0.4 K/UL    ABS. BASOPHILS 0.0 0.0 - 0.1 K/UL    ABS. IMM.  GRANS. 0.1 (H) 0.00 - 0.04 K/UL    DF AUTOMATED     FERRITIN    Collection Time: 07/16/20 12:25 PM   Result Value Ref Range    Ferritin 189 26 - 388 NG/ML   LACTIC ACID    Collection Time: 07/16/20 12:25 PM   Result Value Ref Range    Lactic acid 1.1 0.4 - 2.0 MMOL/L   D DIMER    Collection Time: 07/16/20 12:25 PM   Result Value Ref Range    D-dimer 0.55 0.00 - 0.65 mg/L FEU   PROTHROMBIN TIME + INR    Collection Time: 07/16/20 12:25 PM   Result Value Ref Range    INR 1.2 (H) 0.9 - 1.1      Prothrombin time 12.5 (H) 9.0 - 11.1 sec   FIBRINOGEN    Collection Time: 07/16/20 12:25 PM   Result Value Ref Range    Fibrinogen 703 (H) 200 - 475 mg/dL   LD    Collection Time: 07/16/20 12:27 PM   Result Value Ref Range     81 - 246 U/L   C REACTIVE PROTEIN, QT    Collection Time: 07/16/20 12:27 PM   Result Value Ref Range    C-Reactive protein 18.70 (H) 0.00 - 0.60 mg/dL   PROCALCITONIN    Collection Time: 07/16/20 12:27 PM   Result Value Ref Range    Procalcitonin 0.13 ng/mL   TROPONIN I    Collection Time: 07/16/20 12:27 PM   Result Value Ref Range    Troponin-I, Qt. <0.05 <0.05 ng/mL   MAGNESIUM    Collection Time: 07/16/20 12:27 PM   Result Value Ref Range    Magnesium 2.2 1.6 - 2.4 mg/dL   METABOLIC PANEL, COMPREHENSIVE    Collection Time: 07/16/20 12:27 PM   Result Value Ref Range    Sodium 138 136 - 145 mmol/L    Potassium 3.6 3.5 - 5.1 mmol/L    Chloride 103 97 - 108 mmol/L    CO2 28 21 - 32 mmol/L    Anion gap 7 5 - 15 mmol/L    Glucose 121 (H) 65 - 100 mg/dL    BUN 9 6 - 20 MG/DL    Creatinine 0.77 0.55 - 1.02 MG/DL    BUN/Creatinine ratio 12 12 - 20      GFR est AA >60 >60 ml/min/1.73m2    GFR est non-AA >60 >60 ml/min/1.73m2    Calcium 9.0 8.5 - 10.1 MG/DL    Bilirubin, total 1.3 (H) 0.2 - 1.0 MG/DL    ALT (SGPT) 44 12 - 78 U/L    AST (SGOT) 25 15 - 37 U/L    Alk.  phosphatase 106 45 - 117 U/L    Protein, total 8.6 (H) 6.4 - 8.2 g/dL    Albumin 3.7 3.5 - 5.0 g/dL    Globulin 4.9 (H) 2.0 - 4.0 g/dL    A-G Ratio 0.8 (L) 1.1 - 2.2     SARS-COV-2    Collection Time: 07/16/20  2:32 PM   Result Value Ref Range    Specimen source Nasopharyngeal      Specimen source Nasopharyngeal      COVID-19 rapid test Not detected NOTD

## 2020-07-17 LAB
ANION GAP SERPL CALC-SCNC: 6 MMOL/L (ref 5–15)
ATRIAL RATE: 87 BPM
BNP SERPL-MCNC: 585 PG/ML
BUN SERPL-MCNC: 13 MG/DL (ref 6–20)
BUN/CREAT SERPL: 16 (ref 12–20)
CALCIUM SERPL-MCNC: 9.2 MG/DL (ref 8.5–10.1)
CALCULATED P AXIS, ECG09: 7 DEGREES
CALCULATED R AXIS, ECG10: 7 DEGREES
CALCULATED T AXIS, ECG11: 74 DEGREES
CHLORIDE SERPL-SCNC: 104 MMOL/L (ref 97–108)
CO2 SERPL-SCNC: 27 MMOL/L (ref 21–32)
CREAT SERPL-MCNC: 0.8 MG/DL (ref 0.55–1.02)
D DIMER PPP FEU-MCNC: 0.61 MG/L FEU (ref 0–0.65)
DIAGNOSIS, 93000: NORMAL
ERYTHROCYTE [DISTWIDTH] IN BLOOD BY AUTOMATED COUNT: 14.5 % (ref 11.5–14.5)
EST. AVERAGE GLUCOSE BLD GHB EST-MCNC: 140 MG/DL
FERRITIN SERPL-MCNC: 187 NG/ML (ref 26–388)
FIBRINOGEN PPP-MCNC: 652 MG/DL (ref 200–475)
GLUCOSE BLD STRIP.AUTO-MCNC: 162 MG/DL (ref 65–100)
GLUCOSE BLD STRIP.AUTO-MCNC: 163 MG/DL (ref 65–100)
GLUCOSE BLD STRIP.AUTO-MCNC: 185 MG/DL (ref 65–100)
GLUCOSE BLD STRIP.AUTO-MCNC: 209 MG/DL (ref 65–100)
GLUCOSE SERPL-MCNC: 189 MG/DL (ref 65–100)
HBA1C MFR BLD: 6.5 % (ref 4–5.6)
HCT VFR BLD AUTO: 37 % (ref 35–47)
HGB BLD-MCNC: 11.9 G/DL (ref 11.5–16)
INR PPP: 1.2 (ref 0.9–1.1)
LDH SERPL L TO P-CCNC: 236 U/L (ref 81–246)
MCH RBC QN AUTO: 33.3 PG (ref 26–34)
MCHC RBC AUTO-ENTMCNC: 32.2 G/DL (ref 30–36.5)
MCV RBC AUTO: 103.6 FL (ref 80–99)
NRBC # BLD: 0 K/UL (ref 0–0.01)
NRBC BLD-RTO: 0 PER 100 WBC
P-R INTERVAL, ECG05: 164 MS
PLATELET # BLD AUTO: 322 K/UL (ref 150–400)
PMV BLD AUTO: 10 FL (ref 8.9–12.9)
POTASSIUM SERPL-SCNC: 4 MMOL/L (ref 3.5–5.1)
PROTHROMBIN TIME: 12.1 SEC (ref 9–11.1)
Q-T INTERVAL, ECG07: 368 MS
QRS DURATION, ECG06: 88 MS
QTC CALCULATION (BEZET), ECG08: 442 MS
RBC # BLD AUTO: 3.57 M/UL (ref 3.8–5.2)
SERVICE CMNT-IMP: ABNORMAL
SODIUM SERPL-SCNC: 137 MMOL/L (ref 136–145)
VENTRICULAR RATE, ECG03: 87 BPM
WBC # BLD AUTO: 8.9 K/UL (ref 3.6–11)

## 2020-07-17 PROCEDURE — 36415 COLL VENOUS BLD VENIPUNCTURE: CPT

## 2020-07-17 PROCEDURE — 74011250637 HC RX REV CODE- 250/637: Performed by: FAMILY MEDICINE

## 2020-07-17 PROCEDURE — 94640 AIRWAY INHALATION TREATMENT: CPT

## 2020-07-17 PROCEDURE — 74011250637 HC RX REV CODE- 250/637: Performed by: INTERNAL MEDICINE

## 2020-07-17 PROCEDURE — 80048 BASIC METABOLIC PNL TOTAL CA: CPT

## 2020-07-17 PROCEDURE — 74011250637 HC RX REV CODE- 250/637: Performed by: EMERGENCY MEDICINE

## 2020-07-17 PROCEDURE — 82728 ASSAY OF FERRITIN: CPT

## 2020-07-17 PROCEDURE — 85379 FIBRIN DEGRADATION QUANT: CPT

## 2020-07-17 PROCEDURE — 85027 COMPLETE CBC AUTOMATED: CPT

## 2020-07-17 PROCEDURE — 83880 ASSAY OF NATRIURETIC PEPTIDE: CPT

## 2020-07-17 PROCEDURE — 74011000258 HC RX REV CODE- 258: Performed by: INTERNAL MEDICINE

## 2020-07-17 PROCEDURE — 85610 PROTHROMBIN TIME: CPT

## 2020-07-17 PROCEDURE — 85384 FIBRINOGEN ACTIVITY: CPT

## 2020-07-17 PROCEDURE — 83036 HEMOGLOBIN GLYCOSYLATED A1C: CPT

## 2020-07-17 PROCEDURE — 77010033678 HC OXYGEN DAILY

## 2020-07-17 PROCEDURE — 82962 GLUCOSE BLOOD TEST: CPT

## 2020-07-17 PROCEDURE — 83615 LACTATE (LD) (LDH) ENZYME: CPT

## 2020-07-17 PROCEDURE — 74011636637 HC RX REV CODE- 636/637: Performed by: INTERNAL MEDICINE

## 2020-07-17 PROCEDURE — 65270000029 HC RM PRIVATE

## 2020-07-17 PROCEDURE — 74011250636 HC RX REV CODE- 250/636: Performed by: INTERNAL MEDICINE

## 2020-07-17 RX ORDER — ALBUTEROL SULFATE 0.83 MG/ML
2.5 SOLUTION RESPIRATORY (INHALATION)
Status: DISCONTINUED | OUTPATIENT
Start: 2020-07-17 | End: 2020-07-20 | Stop reason: HOSPADM

## 2020-07-17 RX ORDER — BENZONATATE 100 MG/1
200 CAPSULE ORAL
Status: DISCONTINUED | OUTPATIENT
Start: 2020-07-17 | End: 2020-07-20 | Stop reason: HOSPADM

## 2020-07-17 RX ADMIN — LOSARTAN POTASSIUM 50 MG: 50 TABLET ORAL at 08:09

## 2020-07-17 RX ADMIN — LEVOTHYROXINE SODIUM 75 MCG: 0.07 TABLET ORAL at 08:09

## 2020-07-17 RX ADMIN — Medication 10 ML: at 06:18

## 2020-07-17 RX ADMIN — DEXAMETHASONE 6 MG: 4 TABLET ORAL at 08:09

## 2020-07-17 RX ADMIN — INSULIN LISPRO 2 UNITS: 100 INJECTION, SOLUTION INTRAVENOUS; SUBCUTANEOUS at 11:25

## 2020-07-17 RX ADMIN — ENOXAPARIN SODIUM 40 MG: 40 INJECTION SUBCUTANEOUS at 08:08

## 2020-07-17 RX ADMIN — ARIPIPRAZOLE 10 MG: 5 TABLET ORAL at 08:08

## 2020-07-17 RX ADMIN — AZITHROMYCIN MONOHYDRATE 250 MG: 250 TABLET ORAL at 08:09

## 2020-07-17 RX ADMIN — METFORMIN HYDROCHLORIDE 500 MG: 500 TABLET ORAL at 08:09

## 2020-07-17 RX ADMIN — METHOCARBAMOL TABLETS 500 MG: 500 TABLET, COATED ORAL at 21:44

## 2020-07-17 RX ADMIN — OXCARBAZEPINE 300 MG: 300 TABLET, FILM COATED ORAL at 16:38

## 2020-07-17 RX ADMIN — METHOCARBAMOL TABLETS 500 MG: 500 TABLET, COATED ORAL at 15:25

## 2020-07-17 RX ADMIN — VENLAFAXINE HYDROCHLORIDE 75 MG: 37.5 CAPSULE, EXTENDED RELEASE ORAL at 08:09

## 2020-07-17 RX ADMIN — Medication 10 ML: at 13:12

## 2020-07-17 RX ADMIN — INSULIN LISPRO 3 UNITS: 100 INJECTION, SOLUTION INTRAVENOUS; SUBCUTANEOUS at 16:35

## 2020-07-17 RX ADMIN — ALBUTEROL SULFATE 8 PUFF: 90 AEROSOL, METERED RESPIRATORY (INHALATION) at 03:56

## 2020-07-17 RX ADMIN — ALBUTEROL SULFATE 8 PUFF: 90 AEROSOL, METERED RESPIRATORY (INHALATION) at 11:20

## 2020-07-17 RX ADMIN — Medication 10 ML: at 21:44

## 2020-07-17 RX ADMIN — METHOCARBAMOL TABLETS 500 MG: 500 TABLET, COATED ORAL at 08:09

## 2020-07-17 RX ADMIN — INSULIN LISPRO 2 UNITS: 100 INJECTION, SOLUTION INTRAVENOUS; SUBCUTANEOUS at 08:07

## 2020-07-17 RX ADMIN — CITALOPRAM HYDROBROMIDE 20 MG: 20 TABLET ORAL at 08:09

## 2020-07-17 RX ADMIN — OXYBUTYNIN CHLORIDE 5 MG: 5 TABLET, EXTENDED RELEASE ORAL at 08:09

## 2020-07-17 RX ADMIN — OXYCODONE HYDROCHLORIDE AND ACETAMINOPHEN 250 MG: 500 TABLET ORAL at 16:37

## 2020-07-17 RX ADMIN — CEFTRIAXONE 1 G: 1 INJECTION, POWDER, FOR SOLUTION INTRAMUSCULAR; INTRAVENOUS at 13:12

## 2020-07-17 RX ADMIN — OXCARBAZEPINE 300 MG: 300 TABLET, FILM COATED ORAL at 08:09

## 2020-07-17 RX ADMIN — BENZONATATE 200 MG: 100 CAPSULE ORAL at 15:25

## 2020-07-17 RX ADMIN — ZINC SULFATE 220 MG (50 MG) CAPSULE 220 MG: CAPSULE at 08:09

## 2020-07-17 RX ADMIN — OXYCODONE HYDROCHLORIDE AND ACETAMINOPHEN 250 MG: 500 TABLET ORAL at 08:08

## 2020-07-17 NOTE — PROGRESS NOTES
Problem: Falls - Risk of  Goal: *Absence of Falls  Description: Document Yuriy Clarke Fall Risk and appropriate interventions in the flowsheet. Outcome: Progressing Towards Goal  Note: Fall Risk Interventions:  Mobility Interventions: Assess mobility with egress test, Communicate number of staff needed for ambulation/transfer, Patient to call before getting OOB, Strengthening exercises (ROM-active/passive), Utilize walker, cane, or other assistive device, Utilize gait belt for transfers/ambulation              Elimination Interventions: Call light in reach, Patient to call for help with toileting needs, Stay With Me (per policy), Toilet paper/wipes in reach, Toileting schedule/hourly rounds              Problem: Patient Education: Go to Patient Education Activity  Goal: Patient/Family Education  Outcome: Progressing Towards Goal     Problem: Breathing Pattern - Ineffective  Goal: *Absence of hypoxia  Outcome: Progressing Towards Goal  Goal: *Use of effective breathing techniques  Outcome: Progressing Towards Goal     Problem: Patient Education: Go to Patient Education Activity  Goal: Patient/Family Education  Outcome: Progressing Towards Goal     Problem: Risk for Spread of Infection  Goal: Prevent transmission of infectious organism to others  Description: Prevent the transmission of infectious organisms to other patients, staff members, and visitors.   Outcome: Progressing Towards Goal     Problem: Patient Education:  Go to Education Activity  Goal: Patient/Family Education  Outcome: Progressing Towards Goal

## 2020-07-17 NOTE — PROGRESS NOTES
Problem: Breathing Pattern - Ineffective  Goal: *Use of effective breathing techniques  Outcome: Progressing Towards Goal

## 2020-07-17 NOTE — ED NOTES
TRANSFER - OUT REPORT:    Verbal report given to Aundrea(name) on Mae Pack  being transferred to PCU(unit) for routine progression of care       Report consisted of patients Situation, Background, Assessment and   Recommendations(SBAR). Information from the following report(s) SBAR, ED Summary, [facility] and Recent Results was reviewed with the receiving nurse. Lines:   Peripheral IV 07/16/20 Left Antecubital (Active)   Site Assessment Clean, dry, & intact 07/16/20 1232   Phlebitis Assessment 0 07/16/20 1232   Infiltration Assessment 0 07/16/20 1232   Dressing Status Clean, dry, & intact 07/16/20 1232   Dressing Type Tape;Transparent 07/16/20 1232   Hub Color/Line Status Pink;Patent; Flushed 07/16/20 1232   Action Taken Blood drawn 07/16/20 1232   Alcohol Cap Used Yes 07/16/20 1232        Opportunity for questions and clarification was provided.       Patient transported with:   Kasenna

## 2020-07-17 NOTE — PROGRESS NOTES
Hospitalist Progress Note    NAME: Wilkie Najjar   :  1950   MRN:  [id number]     Subjective:   Daily Progress Note: 2020 3:48 PM      Chief complaint: admitted with sob for r/o covid PNA  Patient seen and examined case d/w RN second COVID test pending. She has cough with white sputum. SOB better. No N/V/D.  Rhinovirus + on Viral PCR    Current Facility-Administered Medications   Medication Dose Route Frequency    benzonatate (TESSALON) capsule 200 mg  200 mg Oral TID PRN    ondansetron (ZOFRAN) injection 4 mg  4 mg IntraVENous Q4H PRN    acetaminophen (TYLENOL) tablet 650 mg  650 mg Oral Q6H PRN    ARIPiprazole (ABILIFY) tablet 10 mg  10 mg Oral DAILY    citalopram (CELEXA) tablet 20 mg  20 mg Oral DAILY    levothyroxine (SYNTHROID) tablet 75 mcg  75 mcg Oral ACB    metFORMIN (GLUCOPHAGE) tablet 500 mg  500 mg Oral DAILY WITH BREAKFAST    methocarbamoL (ROBAXIN) tablet 500 mg  500 mg Oral TID    OXcarbazepine (TRILEPTAL) tablet 300 mg  300 mg Oral BID    oxybutynin chloride XL (DITROPAN XL) tablet 5 mg  5 mg Oral DAILY    venlafaxine-SR (EFFEXOR-XR) capsule 75 mg  75 mg Oral DAILY WITH BREAKFAST    sodium chloride (NS) flush 5-40 mL  5-40 mL IntraVENous Q8H    sodium chloride (NS) flush 5-40 mL  5-40 mL IntraVENous PRN    acetaminophen (TYLENOL) tablet 650 mg  650 mg Oral Q4H PRN    ondansetron (ZOFRAN) injection 4 mg  4 mg IntraVENous Q4H PRN    enoxaparin (LOVENOX) injection 40 mg  40 mg SubCUTAneous Q24H    insulin lispro (HUMALOG) injection   SubCUTAneous AC&HS    glucose chewable tablet 16 g  4 Tab Oral PRN    dextrose (D50W) injection syrg 12.5-25 g  12.5-25 g IntraVENous PRN    glucagon (GLUCAGEN) injection 1 mg  1 mg IntraMUSCular PRN    dexAMETHasone (DECADRON) tablet 6 mg  6 mg Oral DAILY    cefTRIAXone (ROCEPHIN) 1 g in 0.9% sodium chloride (MBP/ADV) 50 mL  1 g IntraVENous Q24H    ascorbic acid (vitamin C) (VITAMIN C) tablet 250 mg  250 mg Oral BID    zinc sulfate (ZINCATE) 220 (50) mg capsule 220 mg  220 mg Oral DAILY    azithromycin (ZITHROMAX) tablet 250 mg  250 mg Oral DAILY    losartan (COZAAR) tablet 50 mg  50 mg Oral DAILY          Objective:     Visit Vitals  /64 (BP 1 Location: Right arm, BP Patient Position: Sitting)   Pulse 86   Temp 97.8 °F (36.6 °C)   Resp 24   Ht 5' 8\" (1.727 m)   Wt 115.9 kg (255 lb 8.2 oz)   SpO2 98%   BMI 38.85 kg/m²    O2 Flow Rate (L/min): 3 l/min O2 Device: Nasal cannula    Temp (24hrs), Av.9 °F (36.6 °C), Min:97.7 °F (36.5 °C), Max:98.4 °F (36.9 °C)        PHYSICAL EXAM:  Gen: Well-developed, well-nourished, in no acute distress  HEENT:  hearing intact to voice, moist mucous membranes  Neck: Supple  Resp: No accessory muscle use, clear breath sounds without wheezes rales or rhonchi  Card: no peripheral edema  Abd:  Soft, non-tender, non-distended,   Musc: No cyanosis or clubbing  Skin: No rashes or ulcers, skin turgor is good  Neuro: no focal defecits  Psych:  Good insight, oriented to person, place and time, alert      Data Review    Recent Results (from the past 24 hour(s))   RESPIRATORY PANEL,PCR,NASOPHARYNGEAL    Collection Time: 20  6:11 PM    Specimen: Nasopharyngeal   Result Value Ref Range    Adenovirus Not detected NOTD      Coronavirus 229E Not detected NOTD      Coronavirus HKU1 Not detected NOTD      Coronavirus CVNL63 Not detected NOTD      Coronavirus OC43 Not detected NOTD      Metapneumovirus Not detected NOTD      Rhinovirus and Enterovirus Detected (A) NOTD      Influenza A Not detected NOTD      Influenza A, subtype H1 Not detected NOTD      Influenza A, subtype H3 Not detected NOTD      INFLUENZA A H1N1 PCR Not detected NOTD      Influenza B Not detected NOTD      Parainfluenza 1 Not detected NOTD      Parainfluenza 2 Not detected NOTD      Parainfluenza 3 Not detected NOTD      Parainfluenza virus 4 Not detected NOTD      RSV by PCR Not detected NOTD      B. parapertussis, PCR Not detected NOTD Bordetella pertussis - PCR Not detected NOTD      Chlamydophila pneumoniae DNA, QL, PCR Not detected NOTD      Mycoplasma pneumoniae DNA, QL, PCR Not detected NOTD     GLUCOSE, POC    Collection Time: 07/16/20 10:07 PM   Result Value Ref Range    Glucose (POC) 191 (H) 65 - 100 mg/dL    Performed by Megan Hughes (PCT)    NT-PRO BNP    Collection Time: 07/17/20  3:55 AM   Result Value Ref Range    NT pro- (H) <125 PG/ML   HEMOGLOBIN A1C WITH EAG    Collection Time: 07/17/20  3:55 AM   Result Value Ref Range    Hemoglobin A1c 6.5 (H) 4.0 - 5.6 %    Est. average glucose 140 mg/dL   D DIMER    Collection Time: 07/17/20  3:55 AM   Result Value Ref Range    D-dimer 0.61 0.00 - 0.65 mg/L FEU   PROTHROMBIN TIME + INR    Collection Time: 07/17/20  3:55 AM   Result Value Ref Range    INR 1.2 (H) 0.9 - 1.1      Prothrombin time 12.1 (H) 9.0 - 28.4 sec   METABOLIC PANEL, BASIC    Collection Time: 07/17/20  3:55 AM   Result Value Ref Range    Sodium 137 136 - 145 mmol/L    Potassium 4.0 3.5 - 5.1 mmol/L    Chloride 104 97 - 108 mmol/L    CO2 27 21 - 32 mmol/L    Anion gap 6 5 - 15 mmol/L    Glucose 189 (H) 65 - 100 mg/dL    BUN 13 6 - 20 MG/DL    Creatinine 0.80 0.55 - 1.02 MG/DL    BUN/Creatinine ratio 16 12 - 20      GFR est AA >60 >60 ml/min/1.73m2    GFR est non-AA >60 >60 ml/min/1.73m2    Calcium 9.2 8.5 - 10.1 MG/DL   CBC W/O DIFF    Collection Time: 07/17/20  3:55 AM   Result Value Ref Range    WBC 8.9 3.6 - 11.0 K/uL    RBC 3.57 (L) 3.80 - 5.20 M/uL    HGB 11.9 11.5 - 16.0 g/dL    HCT 37.0 35.0 - 47.0 %    .6 (H) 80.0 - 99.0 FL    MCH 33.3 26.0 - 34.0 PG    MCHC 32.2 30.0 - 36.5 g/dL    RDW 14.5 11.5 - 14.5 %    PLATELET 044 739 - 670 K/uL    MPV 10.0 8.9 - 12.9 FL    NRBC 0.0 0  WBC    ABSOLUTE NRBC 0.00 0.00 - 0.01 K/uL   FERRITIN    Collection Time: 07/17/20  3:55 AM   Result Value Ref Range    Ferritin 187 26 - 388 NG/ML   LD    Collection Time: 07/17/20  3:55 AM   Result Value Ref Range  81 - 246 U/L   FIBRINOGEN    Collection Time: 07/17/20  3:55 AM   Result Value Ref Range    Fibrinogen 652 (H) 200 - 475 mg/dL   GLUCOSE, POC    Collection Time: 07/17/20  7:48 AM   Result Value Ref Range    Glucose (POC) 162 (H) 65 - 100 mg/dL    Performed by [employer], POC    Collection Time: 07/17/20 11:08 AM   Result Value Ref Range    Glucose (POC) 185 (H) 65 - 100 mg/dL    Performed by [employer]box Foster      No results found for this visit on 07/16/20. All Micro Results     Procedure Component Value Units Date/Time    CULTURE, BLOOD, PAIRED [[id number]] Collected:  07/16/20 1242    Order Status:  Completed Specimen:  Blood Updated:  07/17/20 0732     Special Requests: NO SPECIAL REQUESTS        Culture result: NO GROWTH AFTER 18 HOURS       SARS-COV-2, PCR [[id number]] Collected:  07/16/20 1432    Order Status:  Completed Specimen:  Nasopharyngeal Updated:  07/16/20 2325     Specimen source Nasopharyngeal        SARS-CoV-2 Not detected        Comment:      The specimen is NEGATIVE for SARS-CoV2, the novel coronavirus associated with COVID-19. A negative result does not rule out COVID-19. This test has been authorized by the FDA under an Emergency Use Authorization (EUA) for use by authorized laboratories.         Fact sheet for Healthcare Providers: kstattoo.com  Fact sheet for Patients: https://fda.gov/media/269892/download       Methodology: RT-PCR         RESPIRATORY PANEL,PCR,NASOPHARYNGEAL [[id number]]  (Abnormal) Collected:  07/16/20 1811    Order Status:  Completed Specimen:  Nasopharyngeal Updated:  07/16/20 2221     Adenovirus Not detected        Coronavirus 229E Not detected        Coronavirus HKU1 Not detected        Coronavirus CVNL63 Not detected        Coronavirus OC43 Not detected        Metapneumovirus Not detected        Rhinovirus and Enterovirus Detected        Influenza A Not detected        Influenza A, subtype H1 Not detected        Influenza A, subtype H3 Not detected        INFLUENZA A H1N1 PCR Not detected        Influenza B Not detected        Parainfluenza 1 Not detected        Parainfluenza 2 Not detected        Parainfluenza 3 Not detected        Parainfluenza virus 4 Not detected        RSV by PCR Not detected        B. parapertussis, PCR Not detected        Bordetella pertussis - PCR Not detected        Chlamydophila pneumoniae DNA, QL, PCR Not detected        Mycoplasma pneumoniae DNA, QL, PCR Not detected              Radiology reports and films for the last 24 hours have been reviewed. Assessment/Plan:     Interstitial pneumonia POA/rule out COVID-19:  -Recent travel to [place]  -1 week history of generalized aches and pains, intermittent diarrhea and respiratory difficulty  -Chest x-ray shows interstitial infiltrates suspicious of COVID-19 infection  - droplet plus isolation  -Rapid COVID-19 test is negative repeat  COVID-19   Test pending  - emp iv ceftriaxone and azithromycin as procalcitonin is elevated  -  Decadron 6 mg daily   -If repeat COVID-19 test   Positive needs ID eval for Remdesvir  - RhinoVirus +     Non-insulin-dependent diabetes:  -Continue metformin  -Use sliding scale insulin  - monitor BG     Hypertension:  -Continue valsartan monitor BP     Hypothyroidism;  -Continue levothyroxine        Bipolar disorder;  -Continue home medication        Code Status: Full code  Surrogate Decision Maker: Son at [facility]   DVT Prophylaxis: Lovenox  Baseline:  Independent    Care Plan discussed with: patient and RN     Dispo: home once medically stable 2-3 days      Signed By: Laura Renner MD     July 17, 2020

## 2020-07-17 NOTE — PROGRESS NOTES
Bedside shift change report given to Eliseo cheung (oncoming nurse) by Abe Holden (offgoing nurse). Report included the following information SBAR, Kardex, Intake/Output, MAR, Recent Results and Cardiac Rhythm NSR.

## 2020-07-18 LAB
ANION GAP SERPL CALC-SCNC: 6 MMOL/L (ref 5–15)
BUN SERPL-MCNC: 21 MG/DL (ref 6–20)
BUN/CREAT SERPL: 27 (ref 12–20)
CALCIUM SERPL-MCNC: 9.3 MG/DL (ref 8.5–10.1)
CHLORIDE SERPL-SCNC: 102 MMOL/L (ref 97–108)
CO2 SERPL-SCNC: 29 MMOL/L (ref 21–32)
CREAT SERPL-MCNC: 0.79 MG/DL (ref 0.55–1.02)
D DIMER PPP FEU-MCNC: 0.28 MG/L FEU (ref 0–0.65)
ERYTHROCYTE [DISTWIDTH] IN BLOOD BY AUTOMATED COUNT: 14.4 % (ref 11.5–14.5)
FERRITIN SERPL-MCNC: 146 NG/ML (ref 8–252)
FIBRINOGEN PPP-MCNC: 566 MG/DL (ref 200–475)
GLUCOSE BLD STRIP.AUTO-MCNC: 139 MG/DL (ref 65–100)
GLUCOSE BLD STRIP.AUTO-MCNC: 142 MG/DL (ref 65–100)
GLUCOSE BLD STRIP.AUTO-MCNC: 155 MG/DL (ref 65–100)
GLUCOSE BLD STRIP.AUTO-MCNC: 182 MG/DL (ref 65–100)
GLUCOSE SERPL-MCNC: 139 MG/DL (ref 65–100)
HCT VFR BLD AUTO: 35.4 % (ref 35–47)
HGB BLD-MCNC: 11.3 G/DL (ref 11.5–16)
INR PPP: 1.2 (ref 0.9–1.1)
LDH SERPL L TO P-CCNC: 177 U/L (ref 81–246)
MCH RBC QN AUTO: 33.1 PG (ref 26–34)
MCHC RBC AUTO-ENTMCNC: 31.9 G/DL (ref 30–36.5)
MCV RBC AUTO: 103.8 FL (ref 80–99)
NRBC # BLD: 0 K/UL (ref 0–0.01)
NRBC BLD-RTO: 0 PER 100 WBC
PLATELET # BLD AUTO: 351 K/UL (ref 150–400)
PMV BLD AUTO: 9.8 FL (ref 8.9–12.9)
POTASSIUM SERPL-SCNC: 4.2 MMOL/L (ref 3.5–5.1)
PROTHROMBIN TIME: 12.2 SEC (ref 9–11.1)
RBC # BLD AUTO: 3.41 M/UL (ref 3.8–5.2)
SERVICE CMNT-IMP: ABNORMAL
SODIUM SERPL-SCNC: 137 MMOL/L (ref 136–145)
WBC # BLD AUTO: 12.9 K/UL (ref 3.6–11)

## 2020-07-18 PROCEDURE — 74011636637 HC RX REV CODE- 636/637: Performed by: INTERNAL MEDICINE

## 2020-07-18 PROCEDURE — 74011250636 HC RX REV CODE- 250/636: Performed by: INTERNAL MEDICINE

## 2020-07-18 PROCEDURE — 36415 COLL VENOUS BLD VENIPUNCTURE: CPT

## 2020-07-18 PROCEDURE — 85384 FIBRINOGEN ACTIVITY: CPT

## 2020-07-18 PROCEDURE — 74011000258 HC RX REV CODE- 258: Performed by: INTERNAL MEDICINE

## 2020-07-18 PROCEDURE — 87635 SARS-COV-2 COVID-19 AMP PRB: CPT

## 2020-07-18 PROCEDURE — 85610 PROTHROMBIN TIME: CPT

## 2020-07-18 PROCEDURE — 82728 ASSAY OF FERRITIN: CPT

## 2020-07-18 PROCEDURE — 80048 BASIC METABOLIC PNL TOTAL CA: CPT

## 2020-07-18 PROCEDURE — 82962 GLUCOSE BLOOD TEST: CPT

## 2020-07-18 PROCEDURE — 83615 LACTATE (LD) (LDH) ENZYME: CPT

## 2020-07-18 PROCEDURE — 65270000029 HC RM PRIVATE

## 2020-07-18 PROCEDURE — 85379 FIBRIN DEGRADATION QUANT: CPT

## 2020-07-18 PROCEDURE — 94640 AIRWAY INHALATION TREATMENT: CPT

## 2020-07-18 PROCEDURE — 85027 COMPLETE CBC AUTOMATED: CPT

## 2020-07-18 PROCEDURE — 74011250637 HC RX REV CODE- 250/637: Performed by: HOSPITALIST

## 2020-07-18 PROCEDURE — 74011250637 HC RX REV CODE- 250/637: Performed by: INTERNAL MEDICINE

## 2020-07-18 PROCEDURE — 77010033678 HC OXYGEN DAILY

## 2020-07-18 PROCEDURE — 74011250637 HC RX REV CODE- 250/637: Performed by: FAMILY MEDICINE

## 2020-07-18 RX ORDER — CODEINE PHOSPHATE AND GUAIFENESIN 10; 100 MG/5ML; MG/5ML
5 SOLUTION ORAL
Status: DISCONTINUED | OUTPATIENT
Start: 2020-07-18 | End: 2020-07-20 | Stop reason: HOSPADM

## 2020-07-18 RX ORDER — ALBUTEROL SULFATE 90 UG/1
2 AEROSOL, METERED RESPIRATORY (INHALATION)
Status: DISCONTINUED | OUTPATIENT
Start: 2020-07-18 | End: 2020-07-19

## 2020-07-18 RX ADMIN — OXCARBAZEPINE 300 MG: 300 TABLET, FILM COATED ORAL at 16:44

## 2020-07-18 RX ADMIN — GUAIFENESIN AND CODEINE PHOSPHATE 5 ML: 100; 10 SOLUTION ORAL at 20:27

## 2020-07-18 RX ADMIN — ENOXAPARIN SODIUM 40 MG: 40 INJECTION SUBCUTANEOUS at 08:00

## 2020-07-18 RX ADMIN — METHOCARBAMOL TABLETS 500 MG: 500 TABLET, COATED ORAL at 08:05

## 2020-07-18 RX ADMIN — ALBUTEROL SULFATE 2 PUFF: 90 AEROSOL, METERED RESPIRATORY (INHALATION) at 15:14

## 2020-07-18 RX ADMIN — DEXAMETHASONE 6 MG: 4 TABLET ORAL at 08:05

## 2020-07-18 RX ADMIN — AZITHROMYCIN MONOHYDRATE 250 MG: 250 TABLET ORAL at 08:05

## 2020-07-18 RX ADMIN — METHOCARBAMOL TABLETS 500 MG: 500 TABLET, COATED ORAL at 16:39

## 2020-07-18 RX ADMIN — LEVOTHYROXINE SODIUM 75 MCG: 0.07 TABLET ORAL at 08:04

## 2020-07-18 RX ADMIN — OXCARBAZEPINE 300 MG: 300 TABLET, FILM COATED ORAL at 08:05

## 2020-07-18 RX ADMIN — BENZONATATE 200 MG: 100 CAPSULE ORAL at 12:15

## 2020-07-18 RX ADMIN — BENZONATATE 200 MG: 100 CAPSULE ORAL at 05:29

## 2020-07-18 RX ADMIN — OXYBUTYNIN CHLORIDE 5 MG: 5 TABLET, EXTENDED RELEASE ORAL at 08:05

## 2020-07-18 RX ADMIN — GUAIFENESIN AND CODEINE PHOSPHATE 5 ML: 100; 10 SOLUTION ORAL at 14:47

## 2020-07-18 RX ADMIN — METFORMIN HYDROCHLORIDE 500 MG: 500 TABLET ORAL at 08:05

## 2020-07-18 RX ADMIN — ALBUTEROL SULFATE 2 PUFF: 90 AEROSOL, METERED RESPIRATORY (INHALATION) at 20:35

## 2020-07-18 RX ADMIN — INSULIN LISPRO 2 UNITS: 100 INJECTION, SOLUTION INTRAVENOUS; SUBCUTANEOUS at 08:00

## 2020-07-18 RX ADMIN — CITALOPRAM HYDROBROMIDE 20 MG: 20 TABLET ORAL at 08:04

## 2020-07-18 RX ADMIN — LOSARTAN POTASSIUM 50 MG: 50 TABLET ORAL at 08:05

## 2020-07-18 RX ADMIN — INSULIN LISPRO 2 UNITS: 100 INJECTION, SOLUTION INTRAVENOUS; SUBCUTANEOUS at 16:38

## 2020-07-18 RX ADMIN — Medication 10 ML: at 13:44

## 2020-07-18 RX ADMIN — OXYCODONE HYDROCHLORIDE AND ACETAMINOPHEN 250 MG: 500 TABLET ORAL at 16:44

## 2020-07-18 RX ADMIN — VENLAFAXINE HYDROCHLORIDE 75 MG: 37.5 CAPSULE, EXTENDED RELEASE ORAL at 08:05

## 2020-07-18 RX ADMIN — CEFTRIAXONE 1 G: 1 INJECTION, POWDER, FOR SOLUTION INTRAMUSCULAR; INTRAVENOUS at 13:44

## 2020-07-18 RX ADMIN — ARIPIPRAZOLE 10 MG: 5 TABLET ORAL at 08:05

## 2020-07-18 RX ADMIN — ZINC SULFATE 220 MG (50 MG) CAPSULE 220 MG: CAPSULE at 08:05

## 2020-07-18 RX ADMIN — METHOCARBAMOL TABLETS 500 MG: 500 TABLET, COATED ORAL at 22:31

## 2020-07-18 RX ADMIN — OXYCODONE HYDROCHLORIDE AND ACETAMINOPHEN 250 MG: 500 TABLET ORAL at 08:05

## 2020-07-18 RX ADMIN — Medication 10 ML: at 04:36

## 2020-07-18 RX ADMIN — Medication 5 ML: at 22:00

## 2020-07-18 NOTE — PROGRESS NOTES
Hospitalist Progress Note    NAME: Ana Paula Notice   :  1950   MRN:  [id number]     Subjective:   Daily Progress Note: 2020 3:48 PM      Chief complaint: admitted with sob for r/o covid PNA  Patient seen and examined case d/w RN second COVID test was not send per staff and will send today. She has still  cough with white sputum. SOB same. No N/V/D.      Current Facility-Administered Medications   Medication Dose Route Frequency    guaiFENesin-codeine (ROBITUSSIN AC) 100-10 mg/5 mL solution 5 mL  5 mL Oral Q4H PRN    albuterol (PROVENTIL HFA, VENTOLIN HFA, PROAIR HFA) inhaler 2 Puff  2 Puff Inhalation QID RT    benzonatate (TESSALON) capsule 200 mg  200 mg Oral TID PRN    albuterol (PROVENTIL VENTOLIN) nebulizer solution 2.5 mg  2.5 mg Nebulization Q6H PRN    ondansetron (ZOFRAN) injection 4 mg  4 mg IntraVENous Q4H PRN    acetaminophen (TYLENOL) tablet 650 mg  650 mg Oral Q6H PRN    ARIPiprazole (ABILIFY) tablet 10 mg  10 mg Oral DAILY    citalopram (CELEXA) tablet 20 mg  20 mg Oral DAILY    levothyroxine (SYNTHROID) tablet 75 mcg  75 mcg Oral ACB    metFORMIN (GLUCOPHAGE) tablet 500 mg  500 mg Oral DAILY WITH BREAKFAST    methocarbamoL (ROBAXIN) tablet 500 mg  500 mg Oral TID    OXcarbazepine (TRILEPTAL) tablet 300 mg  300 mg Oral BID    oxybutynin chloride XL (DITROPAN XL) tablet 5 mg  5 mg Oral DAILY    venlafaxine-SR (EFFEXOR-XR) capsule 75 mg  75 mg Oral DAILY WITH BREAKFAST    sodium chloride (NS) flush 5-40 mL  5-40 mL IntraVENous Q8H    sodium chloride (NS) flush 5-40 mL  5-40 mL IntraVENous PRN    acetaminophen (TYLENOL) tablet 650 mg  650 mg Oral Q4H PRN    ondansetron (ZOFRAN) injection 4 mg  4 mg IntraVENous Q4H PRN    enoxaparin (LOVENOX) injection 40 mg  40 mg SubCUTAneous Q24H    insulin lispro (HUMALOG) injection   SubCUTAneous AC&HS    glucose chewable tablet 16 g  4 Tab Oral PRN    dextrose (D50W) injection syrg 12.5-25 g  12.5-25 g IntraVENous PRN    glucagon (GLUCAGEN) injection 1 mg  1 mg IntraMUSCular PRN    dexAMETHasone (DECADRON) tablet 6 mg  6 mg Oral DAILY    cefTRIAXone (ROCEPHIN) 1 g in 0.9% sodium chloride (MBP/ADV) 50 mL  1 g IntraVENous Q24H    ascorbic acid (vitamin C) (VITAMIN C) tablet 250 mg  250 mg Oral BID    zinc sulfate (ZINCATE) 220 (50) mg capsule 220 mg  220 mg Oral DAILY    azithromycin (ZITHROMAX) tablet 250 mg  250 mg Oral DAILY    losartan (COZAAR) tablet 50 mg  50 mg Oral DAILY          Objective:     Visit Vitals  /61   Pulse 69   Temp 98.1 °F (36.7 °C)   Resp 18   Ht 5' 8\" (1.727 m)   Wt 115.9 kg (255 lb 8.2 oz)   SpO2 95%   BMI 38.85 kg/m²    O2 Flow Rate (L/min): 2 l/min O2 Device: Nasal cannula    Temp (24hrs), Av.9 °F (36.6 °C), Min:97.5 °F (36.4 °C), Max:98.1 °F (36.7 °C)        PHYSICAL EXAM:  Gen: Well-developed, well-nourished, in no acute distress  HEENT:  hearing intact to voice, moist mucous membranes  Neck: Supple  Resp: No accessory muscle use, wheezing +  Card: no peripheral edema  Abd:  Soft, non-tender, non-distended,   Musc: No cyanosis or clubbing  Skin: No rashes or ulcers, skin turgor is good  Neuro: no focal defecits  Psych:  Good insight, oriented to person, place and time, alert      Data Review    Recent Results (from the past 24 hour(s))   GLUCOSE, POC    Collection Time: 20  3:59 PM   Result Value Ref Range    Glucose (POC) 209 (H) 65 - 100 mg/dL    Performed by [employer], POC    Collection Time: 20  9:43 PM   Result Value Ref Range    Glucose (POC) 163 (H) 65 - 100 mg/dL    Performed by Eloisa Meehan    D DIMER    Collection Time: 20  4:10 AM   Result Value Ref Range    D-dimer 0.28 0.00 - 0.65 mg/L FEU   PROTHROMBIN TIME + INR    Collection Time: 20  4:10 AM   Result Value Ref Range    INR 1.2 (H) 0.9 - 1.1      Prothrombin time 12.2 (H) 9.0 - 76.3 sec   METABOLIC PANEL, BASIC    Collection Time: 20  4:10 AM   Result Value Ref Range    Sodium 137 136 - 145 mmol/L    Potassium 4.2 3.5 - 5.1 mmol/L    Chloride 102 97 - 108 mmol/L    CO2 29 21 - 32 mmol/L    Anion gap 6 5 - 15 mmol/L    Glucose 139 (H) 65 - 100 mg/dL    BUN 21 (H) 6 - 20 MG/DL    Creatinine 0.79 0.55 - 1.02 MG/DL    BUN/Creatinine ratio 27 (H) 12 - 20      GFR est AA >60 >60 ml/min/1.73m2    GFR est non-AA >60 >60 ml/min/1.73m2    Calcium 9.3 8.5 - 10.1 MG/DL   CBC W/O DIFF    Collection Time: 07/18/20  4:10 AM   Result Value Ref Range    WBC 12.9 (H) 3.6 - 11.0 K/uL    RBC 3.41 (L) 3.80 - 5.20 M/uL    HGB 11.3 (L) 11.5 - 16.0 g/dL    HCT 35.4 35.0 - 47.0 %    .8 (H) 80.0 - 99.0 FL    MCH 33.1 26.0 - 34.0 PG    MCHC 31.9 30.0 - 36.5 g/dL    RDW 14.4 11.5 - 14.5 %    PLATELET 196 741 - 677 K/uL    MPV 9.8 8.9 - 12.9 FL    NRBC 0.0 0  WBC    ABSOLUTE NRBC 0.00 0.00 - 0.01 K/uL   FERRITIN    Collection Time: 07/18/20  4:10 AM   Result Value Ref Range    Ferritin 146 8 - 252 NG/ML   LD    Collection Time: 07/18/20  4:10 AM   Result Value Ref Range     81 - 246 U/L   FIBRINOGEN    Collection Time: 07/18/20  4:10 AM   Result Value Ref Range    Fibrinogen 566 (H) 200 - 475 mg/dL   GLUCOSE, POC    Collection Time: 07/18/20  7:21 AM   Result Value Ref Range    Glucose (POC) 142 (H) 65 - 100 mg/dL    Performed by [id number], POC    Collection Time: 07/18/20 11:04 AM   Result Value Ref Range    Glucose (POC) 139 (H) 65 - 100 mg/dL    Performed by [facility] / [facility]    SARS-COV-2    Collection Time: 07/18/20 12:14 PM   Result Value Ref Range    Specimen source Nasopharyngeal      SARS-CoV-2 PENDING     SARS-CoV-2 PENDING     Specimen source Nasopharyngeal      COVID-19 rapid test PENDING     COVID-19 PENDING NEG     No results found for this visit on 07/16/20.   All Micro Results     Procedure Component Value Units Date/Time    CULTURE, BLOOD, PAIRED [[id number]] Collected:  07/16/20 1242    Order Status:  Completed Specimen:  Blood Updated:  07/18/20 0771 Special Requests: NO SPECIAL REQUESTS        Culture result: NO GROWTH 2 DAYS       SARS-COV-2, PCR [[id number]] Collected:  07/16/20 1432    Order Status:  Completed Specimen:  Nasopharyngeal Updated:  07/16/20 2325     Specimen source Nasopharyngeal        SARS-CoV-2 Not detected        Comment:      The specimen is NEGATIVE for SARS-CoV2, the novel coronavirus associated with COVID-19. A negative result does not rule out COVID-19. This test has been authorized by the FDA under an Emergency Use Authorization (EUA) for use by authorized laboratories. Fact sheet for Healthcare Providers: kstattoo.com  Fact sheet for Patients: https://fda.gov/media/319381/download       Methodology: RT-PCR         RESPIRATORY PANEL,PCR,NASOPHARYNGEAL [[id number]]  (Abnormal) Collected:  07/16/20 1811    Order Status:  Completed Specimen:  Nasopharyngeal Updated:  07/16/20 2221     Adenovirus Not detected        Coronavirus 229E Not detected        Coronavirus HKU1 Not detected        Coronavirus CVNL63 Not detected        Coronavirus OC43 Not detected        Metapneumovirus Not detected        Rhinovirus and Enterovirus Detected        Influenza A Not detected        Influenza A, subtype H1 Not detected        Influenza A, subtype H3 Not detected        INFLUENZA A H1N1 PCR Not detected        Influenza B Not detected        Parainfluenza 1 Not detected        Parainfluenza 2 Not detected        Parainfluenza 3 Not detected        Parainfluenza virus 4 Not detected        RSV by PCR Not detected        B. parapertussis, PCR Not detected        Bordetella pertussis - PCR Not detected        Chlamydophila pneumoniae DNA, QL, PCR Not detected        Mycoplasma pneumoniae DNA, QL, PCR Not detected              Radiology reports and films for the last 24 hours have been reviewed.     Assessment/Plan:     Interstitial pneumonia POA/rule out COVID-19:  -Recent travel to [place]  -1 week history of generalized aches and pains, intermittent diarrhea and respiratory difficulty  -Chest x-ray shows interstitial infiltrates suspicious of COVID-19 infection  - droplet plus isolation  -Rapid COVID-19 test is negative repeat  COVID-19   Test pending 7/18  - emp iv ceftriaxone and azithromycin as procalcitonin is elevated  -  Decadron 6 mg daily   -If repeat COVID-19 test   Positive needs ID eval for Remdesvir  - RhinoVirus +  - wheezing add albuterol inhaler  - not much clinically better     Non-insulin-dependent diabetes:  -Continue metformin  -Use sliding scale insulin  - monitor BG  - stable     Hypertension:  -Continue valsartan monitor BP  - stable     Hypothyroidism;  -Continue levothyroxine  - stable        Bipolar disorder;  -Continue home medication  - stable        Code Status: Full code  Surrogate Decision Maker: Son at [facility]   DVT Prophylaxis: Lovenox  Baseline:  Independent    Care Plan discussed with: patient and RN     Dispo: home once medically stable 1-2days pending second covid test results      Signed By: Venita Macdonald MD     July 18, 2020

## 2020-07-18 NOTE — PROGRESS NOTES
Bedside shift change report given to Brenda Lane (oncoming nurse) by Rufino Alfred (offgoing nurse). Report included the following information SBAR, Kardex, Intake/Output, MAR, Recent Results and Cardiac Rhythm NSR. (Summary of shift: Repeat COVID test sent today. Pt still having coughing spells; MD Ibrahim ordered scheduled albuterol inhaler and prn robitussin AC which were given during shift).

## 2020-07-18 NOTE — PROGRESS NOTES
Problem: Breathing Pattern - Ineffective  Goal: *Absence of hypoxia  Outcome: Progressing Towards Goal     Problem: Breathing Pattern - Ineffective  Goal: *Use of effective breathing techniques  Outcome: Progressing Towards Goal     Problem: Falls - Risk of  Goal: *Absence of Falls  Description: Document Usha Fall Risk and appropriate interventions in the flowsheet.   Outcome: Progressing Towards Goal  Note: Fall Risk Interventions:  Mobility Interventions: Patient to call before getting OOB         Medication Interventions: Evaluate medications/consider consulting pharmacy, Patient to call before getting OOB, Teach patient to arise slowly    Elimination Interventions: Call light in reach, Patient to call for help with toileting needs, Toileting schedule/hourly rounds

## 2020-07-18 NOTE — PROGRESS NOTES
Bedside shift change report given to Suzanne Castillo (oncoming nurse) by Leyda Vaughan (offgoing nurse). Report included the following information SBAR, Kardex, Intake/Output, MAR, Recent Results and Cardiac Rhythm NSR.

## 2020-07-18 NOTE — PROGRESS NOTES
Following message sent to hospitalist on call via Telemed Request:    Pt admitted for hypoxia. Covid 19 result negative. Pt does still have coarse lung sounds with expiratory wheezing. Had order for albuterol inhaler. This was discontinued, not sure why, but pt has no prns available. Can we reorder albuterol inhaler for prn use?

## 2020-07-19 ENCOUNTER — APPOINTMENT (OUTPATIENT)
Dept: CT IMAGING | Age: 70
DRG: 202 | End: 2020-07-19
Attending: INTERNAL MEDICINE
Payer: MEDICARE

## 2020-07-19 LAB
ANION GAP SERPL CALC-SCNC: 6 MMOL/L (ref 5–15)
BUN SERPL-MCNC: 23 MG/DL (ref 6–20)
BUN/CREAT SERPL: 31 (ref 12–20)
CALCIUM SERPL-MCNC: 9.4 MG/DL (ref 8.5–10.1)
CHLORIDE SERPL-SCNC: 100 MMOL/L (ref 97–108)
CO2 SERPL-SCNC: 29 MMOL/L (ref 21–32)
CREAT SERPL-MCNC: 0.75 MG/DL (ref 0.55–1.02)
D DIMER PPP FEU-MCNC: 0.26 MG/L FEU (ref 0–0.65)
ERYTHROCYTE [DISTWIDTH] IN BLOOD BY AUTOMATED COUNT: 14 % (ref 11.5–14.5)
FERRITIN SERPL-MCNC: 127 NG/ML (ref 26–388)
FIBRINOGEN PPP-MCNC: 498 MG/DL (ref 200–475)
GLUCOSE BLD STRIP.AUTO-MCNC: 109 MG/DL (ref 65–100)
GLUCOSE BLD STRIP.AUTO-MCNC: 134 MG/DL (ref 65–100)
GLUCOSE BLD STRIP.AUTO-MCNC: 191 MG/DL (ref 65–100)
GLUCOSE BLD STRIP.AUTO-MCNC: 211 MG/DL (ref 65–100)
GLUCOSE SERPL-MCNC: 110 MG/DL (ref 65–100)
HCT VFR BLD AUTO: 37 % (ref 35–47)
HGB BLD-MCNC: 12 G/DL (ref 11.5–16)
INR PPP: 1.2 (ref 0.9–1.1)
LDH SERPL L TO P-CCNC: 173 U/L (ref 81–246)
MCH RBC QN AUTO: 34.2 PG (ref 26–34)
MCHC RBC AUTO-ENTMCNC: 32.4 G/DL (ref 30–36.5)
MCV RBC AUTO: 105.4 FL (ref 80–99)
NRBC # BLD: 0 K/UL (ref 0–0.01)
NRBC BLD-RTO: 0 PER 100 WBC
PLATELET # BLD AUTO: 355 K/UL (ref 150–400)
PMV BLD AUTO: 9.9 FL (ref 8.9–12.9)
POTASSIUM SERPL-SCNC: 4 MMOL/L (ref 3.5–5.1)
PROTHROMBIN TIME: 12.3 SEC (ref 9–11.1)
RBC # BLD AUTO: 3.51 M/UL (ref 3.8–5.2)
SARS-COV-2, COV2: NOT DETECTED
SERVICE CMNT-IMP: ABNORMAL
SODIUM SERPL-SCNC: 135 MMOL/L (ref 136–145)
SOURCE, COVRS: NORMAL
SPECIMEN SOURCE, FCOV2M: NORMAL
WBC # BLD AUTO: 9.8 K/UL (ref 3.6–11)

## 2020-07-19 PROCEDURE — 74011250637 HC RX REV CODE- 250/637: Performed by: FAMILY MEDICINE

## 2020-07-19 PROCEDURE — 85610 PROTHROMBIN TIME: CPT

## 2020-07-19 PROCEDURE — 80048 BASIC METABOLIC PNL TOTAL CA: CPT

## 2020-07-19 PROCEDURE — 74011636320 HC RX REV CODE- 636/320: Performed by: INTERNAL MEDICINE

## 2020-07-19 PROCEDURE — 74011000258 HC RX REV CODE- 258: Performed by: INTERNAL MEDICINE

## 2020-07-19 PROCEDURE — 82728 ASSAY OF FERRITIN: CPT

## 2020-07-19 PROCEDURE — 74011250637 HC RX REV CODE- 250/637: Performed by: INTERNAL MEDICINE

## 2020-07-19 PROCEDURE — 83615 LACTATE (LD) (LDH) ENZYME: CPT

## 2020-07-19 PROCEDURE — 85027 COMPLETE CBC AUTOMATED: CPT

## 2020-07-19 PROCEDURE — 74011250636 HC RX REV CODE- 250/636: Performed by: INTERNAL MEDICINE

## 2020-07-19 PROCEDURE — 74011636637 HC RX REV CODE- 636/637: Performed by: INTERNAL MEDICINE

## 2020-07-19 PROCEDURE — 82962 GLUCOSE BLOOD TEST: CPT

## 2020-07-19 PROCEDURE — 36415 COLL VENOUS BLD VENIPUNCTURE: CPT

## 2020-07-19 PROCEDURE — 71260 CT THORAX DX C+: CPT

## 2020-07-19 PROCEDURE — 77010033678 HC OXYGEN DAILY

## 2020-07-19 PROCEDURE — 94640 AIRWAY INHALATION TREATMENT: CPT

## 2020-07-19 PROCEDURE — 85379 FIBRIN DEGRADATION QUANT: CPT

## 2020-07-19 PROCEDURE — 65660000000 HC RM CCU STEPDOWN

## 2020-07-19 PROCEDURE — 85384 FIBRINOGEN ACTIVITY: CPT

## 2020-07-19 PROCEDURE — 74011250637 HC RX REV CODE- 250/637: Performed by: HOSPITALIST

## 2020-07-19 RX ORDER — LABETALOL HYDROCHLORIDE 5 MG/ML
10 INJECTION, SOLUTION INTRAVENOUS
Status: DISCONTINUED | OUTPATIENT
Start: 2020-07-19 | End: 2020-07-20 | Stop reason: HOSPADM

## 2020-07-19 RX ORDER — HYDROCODONE POLISTIREX AND CHLORPHENIRAMINE POLISTIREX 10; 8 MG/5ML; MG/5ML
5 SUSPENSION, EXTENDED RELEASE ORAL EVERY 12 HOURS
Status: DISCONTINUED | OUTPATIENT
Start: 2020-07-19 | End: 2020-07-20 | Stop reason: HOSPADM

## 2020-07-19 RX ORDER — LOSARTAN POTASSIUM 100 MG/1
100 TABLET ORAL DAILY
Status: DISCONTINUED | OUTPATIENT
Start: 2020-07-20 | End: 2020-07-20 | Stop reason: HOSPADM

## 2020-07-19 RX ORDER — SODIUM CHLORIDE 0.9 % (FLUSH) 0.9 %
10 SYRINGE (ML) INJECTION
Status: COMPLETED | OUTPATIENT
Start: 2020-07-19 | End: 2020-07-19

## 2020-07-19 RX ORDER — ALBUTEROL SULFATE 90 UG/1
2 AEROSOL, METERED RESPIRATORY (INHALATION)
Status: DISCONTINUED | OUTPATIENT
Start: 2020-07-19 | End: 2020-07-20 | Stop reason: HOSPADM

## 2020-07-19 RX ORDER — FUROSEMIDE 40 MG/1
40 TABLET ORAL ONCE
Status: COMPLETED | OUTPATIENT
Start: 2020-07-20 | End: 2020-07-20

## 2020-07-19 RX ADMIN — ALBUTEROL SULFATE 2 PUFF: 90 AEROSOL, METERED RESPIRATORY (INHALATION) at 08:06

## 2020-07-19 RX ADMIN — Medication 10 ML: at 21:16

## 2020-07-19 RX ADMIN — IOPAMIDOL 100 ML: 755 INJECTION, SOLUTION INTRAVENOUS at 18:50

## 2020-07-19 RX ADMIN — ENOXAPARIN SODIUM 40 MG: 40 INJECTION SUBCUTANEOUS at 08:16

## 2020-07-19 RX ADMIN — Medication 10 ML: at 04:55

## 2020-07-19 RX ADMIN — ALBUTEROL SULFATE 2 PUFF: 90 AEROSOL, METERED RESPIRATORY (INHALATION) at 20:00

## 2020-07-19 RX ADMIN — HYDROCODONE POLISTIREX AND CHLORPHENIRAMINE POLISTIREX 5 ML: 10; 8 SUSPENSION, EXTENDED RELEASE ORAL at 20:20

## 2020-07-19 RX ADMIN — ZINC SULFATE 220 MG (50 MG) CAPSULE 220 MG: CAPSULE at 08:15

## 2020-07-19 RX ADMIN — METHOCARBAMOL TABLETS 500 MG: 500 TABLET, COATED ORAL at 21:16

## 2020-07-19 RX ADMIN — HYDROCODONE POLISTIREX AND CHLORPHENIRAMINE POLISTIREX 5 ML: 10; 8 SUSPENSION, EXTENDED RELEASE ORAL at 10:39

## 2020-07-19 RX ADMIN — OXYCODONE HYDROCHLORIDE AND ACETAMINOPHEN 250 MG: 500 TABLET ORAL at 08:15

## 2020-07-19 RX ADMIN — LEVOTHYROXINE SODIUM 75 MCG: 0.07 TABLET ORAL at 04:53

## 2020-07-19 RX ADMIN — INSULIN LISPRO 2 UNITS: 100 INJECTION, SOLUTION INTRAVENOUS; SUBCUTANEOUS at 21:15

## 2020-07-19 RX ADMIN — CEFTRIAXONE 1 G: 1 INJECTION, POWDER, FOR SOLUTION INTRAMUSCULAR; INTRAVENOUS at 13:40

## 2020-07-19 RX ADMIN — AZITHROMYCIN MONOHYDRATE 250 MG: 250 TABLET ORAL at 08:15

## 2020-07-19 RX ADMIN — GUAIFENESIN AND CODEINE PHOSPHATE 5 ML: 100; 10 SOLUTION ORAL at 14:47

## 2020-07-19 RX ADMIN — METFORMIN HYDROCHLORIDE 500 MG: 500 TABLET ORAL at 08:15

## 2020-07-19 RX ADMIN — GUAIFENESIN AND CODEINE PHOSPHATE 5 ML: 100; 10 SOLUTION ORAL at 05:10

## 2020-07-19 RX ADMIN — ARIPIPRAZOLE 10 MG: 5 TABLET ORAL at 08:16

## 2020-07-19 RX ADMIN — Medication 10 ML: at 13:40

## 2020-07-19 RX ADMIN — OXCARBAZEPINE 300 MG: 300 TABLET, FILM COATED ORAL at 08:15

## 2020-07-19 RX ADMIN — LOSARTAN POTASSIUM 50 MG: 50 TABLET ORAL at 08:15

## 2020-07-19 RX ADMIN — INSULIN LISPRO 2 UNITS: 100 INJECTION, SOLUTION INTRAVENOUS; SUBCUTANEOUS at 17:00

## 2020-07-19 RX ADMIN — CITALOPRAM HYDROBROMIDE 20 MG: 20 TABLET ORAL at 08:16

## 2020-07-19 RX ADMIN — OXCARBAZEPINE 300 MG: 300 TABLET, FILM COATED ORAL at 17:00

## 2020-07-19 RX ADMIN — METHOCARBAMOL TABLETS 500 MG: 500 TABLET, COATED ORAL at 17:00

## 2020-07-19 RX ADMIN — METHOCARBAMOL TABLETS 500 MG: 500 TABLET, COATED ORAL at 08:15

## 2020-07-19 RX ADMIN — OXYBUTYNIN CHLORIDE 5 MG: 5 TABLET, EXTENDED RELEASE ORAL at 08:15

## 2020-07-19 RX ADMIN — Medication 10 ML: at 18:50

## 2020-07-19 RX ADMIN — ALBUTEROL SULFATE 2 PUFF: 90 AEROSOL, METERED RESPIRATORY (INHALATION) at 15:17

## 2020-07-19 RX ADMIN — VENLAFAXINE HYDROCHLORIDE 75 MG: 37.5 CAPSULE, EXTENDED RELEASE ORAL at 08:15

## 2020-07-19 RX ADMIN — BENZONATATE 200 MG: 100 CAPSULE ORAL at 06:39

## 2020-07-19 RX ADMIN — DEXAMETHASONE 6 MG: 4 TABLET ORAL at 08:16

## 2020-07-19 RX ADMIN — BENZONATATE 200 MG: 100 CAPSULE ORAL at 17:02

## 2020-07-19 NOTE — PROGRESS NOTES
Problem: Falls - Risk of  Goal: *Absence of Falls  Description: Document Madhu Germán Fall Risk and appropriate interventions in the flowsheet.   Outcome: Progressing Towards Goal  Note: Fall Risk Interventions:  Mobility Interventions: Patient to call before getting OOB, Communicate number of staff needed for ambulation/transfer, PT Consult for mobility concerns, Strengthening exercises (ROM-active/passive)         Medication Interventions: Bed/chair exit alarm    Elimination Interventions: Call light in reach, Bed/chair exit alarm, Patient to call for help with toileting needs

## 2020-07-19 NOTE — ROUTINE PROCESS
Bedside shift change report given to Maki Lee RN (oncoming nurse) by Lulu Rangel (offgoing nurse). Report included the following information SBAR, Kardex, Intake/Output, MAR, Recent Results and Cardiac Rhythm NSR. (Summary of shift: Repeat COVID test sent today. Pt still having coughing spells; MD Ibrahim ordered scheduled albuterol inhaler and prn robitussin AC which were given during shift).

## 2020-07-19 NOTE — PROGRESS NOTES
0700: Bedside shift change report given to Priscila Mason (oncoming nurse) by Gladys Cardona, NONI (offgoing nurse). Report included the following information SBAR, Kardex, Intake/Output and MAR.     0700: Patient in bed, resting quietly. Call bell in reach, will monitor. 0920: Dr. Gianna Latham at bedside. Will add on something for patient's cough and encouraging patient to get up to the chair. Otherwise, continue current treatment and await second COVID test.     1120: Patient's COVID test came back negative X2. Dr. Liz Ramírez to d/c precautions and place transfer orders. 1818: Patient off the floor for CT    1852: Patient back on the floor from CT    1900: Bedside shift change report given to Judge oGdwin RN (oncoming nurse) by Mark Marte RN (offgoing nurse). Report included the following information SBAR, Kardex, Intake/Output and MAR. call appt 2 weeks

## 2020-07-19 NOTE — PROGRESS NOTES
Hospitalist Progress Note    NAME: Kvng Gallo   :  1950   MRN:  [id number]     Subjective:   Daily Progress Note: 2020 3:48 PM      Chief complaint: admitted with sob for r/o covid PNA  Patient seen today in PCU, reports intractable cough, denies any worsening of breathing.   No chest pain    Current Facility-Administered Medications   Medication Dose Route Frequency    guaiFENesin-codeine (ROBITUSSIN AC) 100-10 mg/5 mL solution 5 mL  5 mL Oral Q4H PRN    albuterol (PROVENTIL HFA, VENTOLIN HFA, PROAIR HFA) inhaler 2 Puff  2 Puff Inhalation QID RT    benzonatate (TESSALON) capsule 200 mg  200 mg Oral TID PRN    albuterol (PROVENTIL VENTOLIN) nebulizer solution 2.5 mg  2.5 mg Nebulization Q6H PRN    ondansetron (ZOFRAN) injection 4 mg  4 mg IntraVENous Q4H PRN    acetaminophen (TYLENOL) tablet 650 mg  650 mg Oral Q6H PRN    ARIPiprazole (ABILIFY) tablet 10 mg  10 mg Oral DAILY    citalopram (CELEXA) tablet 20 mg  20 mg Oral DAILY    levothyroxine (SYNTHROID) tablet 75 mcg  75 mcg Oral ACB    metFORMIN (GLUCOPHAGE) tablet 500 mg  500 mg Oral DAILY WITH BREAKFAST    methocarbamoL (ROBAXIN) tablet 500 mg  500 mg Oral TID    OXcarbazepine (TRILEPTAL) tablet 300 mg  300 mg Oral BID    oxybutynin chloride XL (DITROPAN XL) tablet 5 mg  5 mg Oral DAILY    venlafaxine-SR (EFFEXOR-XR) capsule 75 mg  75 mg Oral DAILY WITH BREAKFAST    sodium chloride (NS) flush 5-40 mL  5-40 mL IntraVENous Q8H    sodium chloride (NS) flush 5-40 mL  5-40 mL IntraVENous PRN    acetaminophen (TYLENOL) tablet 650 mg  650 mg Oral Q4H PRN    ondansetron (ZOFRAN) injection 4 mg  4 mg IntraVENous Q4H PRN    enoxaparin (LOVENOX) injection 40 mg  40 mg SubCUTAneous Q24H    insulin lispro (HUMALOG) injection   SubCUTAneous AC&HS    glucose chewable tablet 16 g  4 Tab Oral PRN    dextrose (D50W) injection syrg 12.5-25 g  12.5-25 g IntraVENous PRN    glucagon (GLUCAGEN) injection 1 mg  1 mg IntraMUSCular PRN    dexAMETHasone (DECADRON) tablet 6 mg  6 mg Oral DAILY    cefTRIAXone (ROCEPHIN) 1 g in 0.9% sodium chloride (MBP/ADV) 50 mL  1 g IntraVENous Q24H    ascorbic acid (vitamin C) (VITAMIN C) tablet 250 mg  250 mg Oral BID    zinc sulfate (ZINCATE) 220 (50) mg capsule 220 mg  220 mg Oral DAILY    azithromycin (ZITHROMAX) tablet 250 mg  250 mg Oral DAILY    losartan (COZAAR) tablet 50 mg  50 mg Oral DAILY          Objective:     Visit Vitals  /86 (BP 1 Location: Right arm, BP Patient Position: Sitting)   Pulse 70   Temp 97.8 °F (36.6 °C)   Resp 18   Ht 5' 8\" (1.727 m)   Wt 113.8 kg (250 lb 14.1 oz)   SpO2 96%   BMI 38.15 kg/m²    O2 Flow Rate (L/min): 2 l/min O2 Device: Nasal cannula    Temp (24hrs), Av °F (36.7 °C), Min:97.8 °F (36.6 °C), Max:98.3 °F (36.8 °C)        PHYSICAL EXAM:  Gen: Well-developed, well-nourished, in no acute distress  HEENT:  hearing intact to voice, moist mucous membranes  Neck: Supple  Resp: No accessory muscle use,  Card: no peripheral edema  Abd:  Soft, non-tender, non-distended,   Musc: No cyanosis or clubbing  Skin: No rashes or ulcers, skin turgor is good  Neuro: no focal defecits  Psych:  Good insight, oriented to person, place and time, alert      Data Review    Recent Results (from the past 24 hour(s))   GLUCOSE, POC    Collection Time: 20 11:04 AM   Result Value Ref Range    Glucose (POC) 139 (H) 65 - 100 mg/dL    Performed by [address] [employer]    SARS-COV-2    Collection Time: 20 12:14 PM   Result Value Ref Range    Specimen source Nasopharyngeal      SARS-CoV-2 PENDING     SARS-CoV-2 PENDING     Specimen source Nasopharyngeal      COVID-19 PENDING NEG   GLUCOSE, POC    Collection Time: 20  4:10 PM   Result Value Ref Range    Glucose (POC) 182 (H) 65 - 100 mg/dL    Performed by [employer], POC    Collection Time: 20  8:45 PM   Result Value Ref Range    Glucose (POC) 155 (H) 65 - 100 mg/dL    Performed by Nathanel Frankel (PCT)    D DIMER    Collection Time: 07/19/20  3:52 AM   Result Value Ref Range    D-dimer 0.26 0.00 - 0.65 mg/L FEU   PROTHROMBIN TIME + INR    Collection Time: 07/19/20  3:52 AM   Result Value Ref Range    INR 1.2 (H) 0.9 - 1.1      Prothrombin time 12.3 (H) 9.0 - 77.3 sec   METABOLIC PANEL, BASIC    Collection Time: 07/19/20  3:52 AM   Result Value Ref Range    Sodium 135 (L) 136 - 145 mmol/L    Potassium 4.0 3.5 - 5.1 mmol/L    Chloride 100 97 - 108 mmol/L    CO2 29 21 - 32 mmol/L    Anion gap 6 5 - 15 mmol/L    Glucose 110 (H) 65 - 100 mg/dL    BUN 23 (H) 6 - 20 MG/DL    Creatinine 0.75 0.55 - 1.02 MG/DL    BUN/Creatinine ratio 31 (H) 12 - 20      GFR est AA >60 >60 ml/min/1.73m2    GFR est non-AA >60 >60 ml/min/1.73m2    Calcium 9.4 8.5 - 10.1 MG/DL   CBC W/O DIFF    Collection Time: 07/19/20  3:52 AM   Result Value Ref Range    WBC 9.8 3.6 - 11.0 K/uL    RBC 3.51 (L) 3.80 - 5.20 M/uL    HGB 12.0 11.5 - 16.0 g/dL    HCT 37.0 35.0 - 47.0 %    .4 (H) 80.0 - 99.0 FL    MCH 34.2 (H) 26.0 - 34.0 PG    MCHC 32.4 30.0 - 36.5 g/dL    RDW 14.0 11.5 - 14.5 %    PLATELET 151 176 - 754 K/uL    MPV 9.9 8.9 - 12.9 FL    NRBC 0.0 0  WBC    ABSOLUTE NRBC 0.00 0.00 - 0.01 K/uL   LD    Collection Time: 07/19/20  3:52 AM   Result Value Ref Range     81 - 246 U/L   FIBRINOGEN    Collection Time: 07/19/20  3:52 AM   Result Value Ref Range    Fibrinogen 498 (H) 200 - 475 mg/dL   GLUCOSE, POC    Collection Time: 07/19/20  7:22 AM   Result Value Ref Range    Glucose (POC) 109 (H) 65 - 100 mg/dL    Performed by Valentine Ji (PCT)      No results found for this visit on 07/16/20.   All Micro Results     Procedure Component Value Units Date/Time    CULTURE, BLOOD, PAIRED [[id number]] Collected:  07/16/20 1242    Order Status:  Completed Specimen:  Blood Updated:  07/19/20 0827     Special Requests: NO SPECIAL REQUESTS        Culture result: NO GROWTH 3 DAYS       SARS-COV-2, PCR [[id number]] Collected:  07/16/20 1432 Order Status:  Completed Specimen:  Nasopharyngeal Updated:  07/16/20 2325     Specimen source Nasopharyngeal        SARS-CoV-2 Not detected        Comment:      The specimen is NEGATIVE for SARS-CoV2, the novel coronavirus associated with COVID-19. A negative result does not rule out COVID-19. This test has been authorized by the FDA under an Emergency Use Authorization (EUA) for use by authorized laboratories. Fact sheet for Healthcare Providers: kstattoo.com  Fact sheet for Patients: https://fda.gov/media/869293/download       Methodology: RT-PCR         RESPIRATORY PANEL,PCR,NASOPHARYNGEAL [[id number]]  (Abnormal) Collected:  07/16/20 1811    Order Status:  Completed Specimen:  Nasopharyngeal Updated:  07/16/20 2221     Adenovirus Not detected        Coronavirus 229E Not detected        Coronavirus HKU1 Not detected        Coronavirus CVNL63 Not detected        Coronavirus OC43 Not detected        Metapneumovirus Not detected        Rhinovirus and Enterovirus Detected        Influenza A Not detected        Influenza A, subtype H1 Not detected        Influenza A, subtype H3 Not detected        INFLUENZA A H1N1 PCR Not detected        Influenza B Not detected        Parainfluenza 1 Not detected        Parainfluenza 2 Not detected        Parainfluenza 3 Not detected        Parainfluenza virus 4 Not detected        RSV by PCR Not detected        B. parapertussis, PCR Not detected        Bordetella pertussis - PCR Not detected        Chlamydophila pneumoniae DNA, QL, PCR Not detected        Mycoplasma pneumoniae DNA, QL, PCR Not detected              Radiology reports and films for the last 24 hours have been reviewed.     Assessment/Plan:     Interstitial pneumonia POA/rule out COVID-19:  -Recent travel to [place]  -1 week history of generalized aches and pains, intermittent diarrhea and respiratory difficulty  -Chest x-ray shows interstitial infiltrates suspicious of COVID-19 infection  - droplet plus isolation  -Rapid COVID-19 test is negative repeat  COVID-19   Test pending 7/18    - emp iv ceftriaxone and azithromycin as procalcitonin is elevated  -  Decadron 6 mg daily   -If repeat COVID-19 test   Positive needs ID eval for Remdesvir  - RhinoVirus +  -Continue lbuterol inhaler  -Continue supplemental oxygen, currently on 2 L, will try to wean down as long saturation remain more than 92%       Non-insulin-dependent diabetes:  -Continue metformin  -Use sliding scale insulin  - monitor BG  - stable     Hypertension:  -Continue valsartan monitor BP, increased dose today  -PRN labetalol     Hypothyroidism;  -Continue levothyroxine  - stable        Bipolar disorder;  -Continue home medication  - stable        Code Status: Full code  Surrogate Decision Maker: Son at [facility]   DVT Prophylaxis: Lovenox  Baseline:  Independent    Care Plan discussed with: patient and RN     Dispo: home once medically stable ,      Signed By: Promise Trivedi MD     July 19, 2020

## 2020-07-20 VITALS
TEMPERATURE: 97.9 F | WEIGHT: 250.88 LBS | HEART RATE: 71 BPM | BODY MASS INDEX: 38.02 KG/M2 | RESPIRATION RATE: 18 BRPM | OXYGEN SATURATION: 94 % | DIASTOLIC BLOOD PRESSURE: 53 MMHG | SYSTOLIC BLOOD PRESSURE: 135 MMHG | HEIGHT: 68 IN

## 2020-07-20 LAB
D DIMER PPP FEU-MCNC: 0.23 MG/L FEU (ref 0–0.65)
FERRITIN SERPL-MCNC: 112 NG/ML (ref 26–388)
FIBRINOGEN PPP-MCNC: 428 MG/DL (ref 200–475)
GLUCOSE BLD STRIP.AUTO-MCNC: 113 MG/DL (ref 65–100)
GLUCOSE BLD STRIP.AUTO-MCNC: 181 MG/DL (ref 65–100)
INR PPP: 1.2 (ref 0.9–1.1)
LDH SERPL L TO P-CCNC: 179 U/L (ref 81–246)
PROTHROMBIN TIME: 12.2 SEC (ref 9–11.1)
SERVICE CMNT-IMP: ABNORMAL
SERVICE CMNT-IMP: ABNORMAL

## 2020-07-20 PROCEDURE — 74011636637 HC RX REV CODE- 636/637: Performed by: INTERNAL MEDICINE

## 2020-07-20 PROCEDURE — 85379 FIBRIN DEGRADATION QUANT: CPT

## 2020-07-20 PROCEDURE — 36415 COLL VENOUS BLD VENIPUNCTURE: CPT

## 2020-07-20 PROCEDURE — 82962 GLUCOSE BLOOD TEST: CPT

## 2020-07-20 PROCEDURE — 77010033678 HC OXYGEN DAILY

## 2020-07-20 PROCEDURE — 74011250637 HC RX REV CODE- 250/637: Performed by: INTERNAL MEDICINE

## 2020-07-20 PROCEDURE — 83615 LACTATE (LD) (LDH) ENZYME: CPT

## 2020-07-20 PROCEDURE — 74011250637 HC RX REV CODE- 250/637: Performed by: HOSPITALIST

## 2020-07-20 PROCEDURE — 85384 FIBRINOGEN ACTIVITY: CPT

## 2020-07-20 PROCEDURE — 74011250637 HC RX REV CODE- 250/637: Performed by: FAMILY MEDICINE

## 2020-07-20 PROCEDURE — 74011250636 HC RX REV CODE- 250/636: Performed by: INTERNAL MEDICINE

## 2020-07-20 PROCEDURE — 94640 AIRWAY INHALATION TREATMENT: CPT

## 2020-07-20 PROCEDURE — 82728 ASSAY OF FERRITIN: CPT

## 2020-07-20 PROCEDURE — 85610 PROTHROMBIN TIME: CPT

## 2020-07-20 RX ORDER — BENZONATATE 200 MG/1
200 CAPSULE ORAL
Qty: 20 CAP | Refills: 1 | Status: SHIPPED | OUTPATIENT
Start: 2020-07-20 | End: 2020-08-03

## 2020-07-20 RX ORDER — HYDROCODONE POLISTIREX AND CHLORPHENIRAMINE POLISTIREX 10; 8 MG/5ML; MG/5ML
5 SUSPENSION, EXTENDED RELEASE ORAL
Qty: 115 ML | Refills: 0 | Status: SHIPPED | OUTPATIENT
Start: 2020-07-20 | End: 2020-08-01

## 2020-07-20 RX ADMIN — LOSARTAN POTASSIUM 100 MG: 100 TABLET, FILM COATED ORAL at 08:05

## 2020-07-20 RX ADMIN — Medication 10 ML: at 05:46

## 2020-07-20 RX ADMIN — ALBUTEROL SULFATE 2 PUFF: 90 AEROSOL, METERED RESPIRATORY (INHALATION) at 08:12

## 2020-07-20 RX ADMIN — CITALOPRAM HYDROBROMIDE 20 MG: 20 TABLET ORAL at 08:05

## 2020-07-20 RX ADMIN — BENZONATATE 200 MG: 100 CAPSULE ORAL at 03:03

## 2020-07-20 RX ADMIN — OXCARBAZEPINE 300 MG: 300 TABLET, FILM COATED ORAL at 08:05

## 2020-07-20 RX ADMIN — INSULIN LISPRO 2 UNITS: 100 INJECTION, SOLUTION INTRAVENOUS; SUBCUTANEOUS at 11:01

## 2020-07-20 RX ADMIN — AZITHROMYCIN MONOHYDRATE 250 MG: 250 TABLET ORAL at 08:05

## 2020-07-20 RX ADMIN — ARIPIPRAZOLE 10 MG: 5 TABLET ORAL at 08:09

## 2020-07-20 RX ADMIN — OXYBUTYNIN CHLORIDE 5 MG: 5 TABLET, EXTENDED RELEASE ORAL at 08:05

## 2020-07-20 RX ADMIN — METHOCARBAMOL TABLETS 500 MG: 500 TABLET, COATED ORAL at 08:05

## 2020-07-20 RX ADMIN — VENLAFAXINE HYDROCHLORIDE 75 MG: 37.5 CAPSULE, EXTENDED RELEASE ORAL at 08:05

## 2020-07-20 RX ADMIN — GUAIFENESIN AND CODEINE PHOSPHATE 5 ML: 100; 10 SOLUTION ORAL at 03:03

## 2020-07-20 RX ADMIN — FUROSEMIDE 40 MG: 40 TABLET ORAL at 05:46

## 2020-07-20 RX ADMIN — ENOXAPARIN SODIUM 40 MG: 40 INJECTION SUBCUTANEOUS at 08:05

## 2020-07-20 RX ADMIN — LEVOTHYROXINE SODIUM 75 MCG: 0.07 TABLET ORAL at 08:05

## 2020-07-20 RX ADMIN — HYDROCODONE POLISTIREX AND CHLORPHENIRAMINE POLISTIREX 5 ML: 10; 8 SUSPENSION, EXTENDED RELEASE ORAL at 08:05

## 2020-07-20 NOTE — DISCHARGE INSTRUCTIONS
HOSPITALIST DISCHARGE INSTRUCTIONS    NAME: Janet Blount   :  1950   MRN:  [id number]     Date/Time:  2020 10:36 AM    ADMIT DATE: 2020   DISCHARGE DATE: 2020     Acute bronchitis  Chronic interstitial lung disease  Non-insulin-dependent diabetes  Hypertension:  Hypothyroidism  Bipolar disorder    · It is important that you take the medication exactly as they are prescribed. · Keep your medication in the bottles provided by the pharmacist and keep a list of the medication names, dosages, and times to be taken in your wallet. · Do not take other medications without consulting your doctor. What to do at [address]:  Resume previous diet    Recommended activity: Activity as tolerated      If you have questions regarding the hospital related prescriptions or hospital related issues please call [facility] at [phone]. You can always direct your questions to your primary care doctor if you are unable to reach your hospital physician; your PCP works as an extension of your hospital doctor just like your hospital doctor is an extension of your PCP for your time at the [facility], [facility])    If you experience any of the following symptoms then please call your primary care physician or return to the emergency room if you cannot get hold of your doctor:    Fever, chills, nausea, vomiting, or persistent diarrhea  Worsening weakness or new problems with your speech or balance  Dark stools or visible blood in your stools  New Leg swelling or shortness of breath as these could be signs of a clot    Additional Instructions:      Bring these papers with you to your follow up appointments. The papers will help your doctors be sure to continue the care plan from the hospital.              Information obtained by :  I understand that if any problems occur once I am at home I am to contact my physician.     I understand and acknowledge receipt of the instructions indicated above.                                                                                                                                            Physician's or R.N.'s Signature                                                                  Date/Time                                                                                                                                              Patient or Representative Signature

## 2020-07-20 NOTE — PROGRESS NOTES
TRANSFER - IN REPORT:    Verbal report received from Uma(name) on Mae Pack  being received from PCU(unit) for routine progression of care      Report consisted of patients Situation, Background, Assessment and   Recommendations(SBAR). Information from the following report(s) SBAR, Kardex, Intake/Output, MAR, Accordion, Recent Results and Cardiac Rhythm NSR was reviewed with the receiving nurse. Opportunity for questions and clarification was provided. Assessment completed upon patients arrival to unit and care assumed.

## 2020-07-20 NOTE — PROGRESS NOTES
Problem: Falls - Risk of  Goal: *Absence of Falls  Description: Document [facility] Host Fall Risk and appropriate interventions in the flowsheet.   Outcome: Progressing Towards Goal  Note: Fall Risk Interventions:  Mobility Interventions: Patient to call before getting OOB         Medication Interventions: Bed/chair exit alarm, Teach patient to arise slowly, Patient to call before getting OOB    Elimination Interventions: Patient to call for help with toileting needs

## 2020-07-20 NOTE — DISCHARGE SUMMARY
Hospitalist Discharge Summary     Patient ID:  Brian Rice  [id number]  78 y.o.  1950 7/16/2020    PCP on record: Unknown, Provider    Admit date: 7/16/2020  Discharge date and time: 7/20/2020    DISCHARGE DIAGNOSIS:    Acute bronchitis  Chronic interstitial lung disease  Non-insulin-dependent diabetes  Hypertension:  Hypothyroidism  Bipolar disorder      CONSULTATIONS:  IP CONSULT TO HOSPITALIST    Excerpted HPI from H&P of Zachariah Cedeño MD:  Brian Rice is a 78 y.o.  female with past medical history significant for bipolar disorder, non-insulin-dependent diabetes, hypothyroidism and hypertension who presents with the complaints of generalized aches and pains, intermittent diarrhea and shortness of breath for 1 week. Patient reports she went to [place] on 4 July and returned on Saturday. She reports for the last 1 week she has been having the symptoms of generalized aches and pains with one episode of loose bowel movement every day and progressive shortness of breath. She denies any nausea or vomiting. Reports subjective fever and chills. Denies anybody else at home who is sick with similar symptoms. In the ED she had a chest x-ray done which showed interstitial infiltrates. She denies any orthopnea, PND but reports intermittent cough. She also denies any chest pain, urine urgency frequency dysuria, heat or cold intolerance       ______________________________________________________________________  DISCHARGE SUMMARY/HOSPITAL COURSE:  for full details see H&P, daily progress notes, labs, consult notes. Acute bronchitis  -Initial concern for COVID-19 and viral pneumonia, CT scan showed chronic interstitial lung disease, without any pulmonic infiltrate.   Patient's inflammatory markers remain stable  COVID-19 was checked twice, negative  -Viral panel positive for rhinovirus            Non-insulin-dependent diabetes:  -Continue metformin  -Use sliding scale insulin  - monitor BG  - stable     Hypertension:  -Continue valsartan monitor BP, increased dose today  -PRN labetalol     Hypothyroidism;  -Continue levothyroxine  - stable        Bipolar disorder;  -Continue home medication  - stable        _______________________________________________________________________  Patient seen and examined by me on discharge day. Pertinent Findings:  Gen:    Not in distress  Chest: Clear lungs  CVS:   Regular rhythm. No edema  Abd:  Soft, not distended, not tender  Neuro:  Alert, oriented x3   _______________________________________________________________________  DISCHARGE MEDICATIONS:   Current Discharge Medication List      START taking these medications    Details   benzonatate (TESSALON) 200 mg capsule Take 1 Cap by mouth three (3) times daily as needed for Cough for up to 14 days. Qty: 20 Cap, Refills: 1      chlorpheniramine-HYDROcodone (TUSSIONEX) 10-8 mg/5 mL suspension Take 5 mL by mouth every twelve (12) hours as needed for Cough for up to 12 days. Max Daily Amount: 10 mL. Qty: 115 mL, Refills: 0    Associated Diagnoses: Cough         CONTINUE these medications which have NOT CHANGED    Details   methocarbamoL (ROBAXIN) 500 mg tablet Take  by mouth three (3) times daily. ARIPiprazole (ABILIFY) 10 mg tablet Take 10 mg by mouth daily. metFORMIN (GLUCOPHAGE) 500 mg tablet Take 500 mg by mouth daily (with breakfast). OXcarbazepine (TRILEPTAL) 300 mg tablet Take 300 mg by mouth two (2) times a day. venlafaxine-SR (EFFEXOR-XR) 75 mg capsule Take 75 mg by mouth daily. levothyroxine (SYNTHROID) 75 mcg tablet Take 75 mcg by mouth Daily (before breakfast). citalopram (CELEXA) 20 mg tablet Take 20 mg by mouth daily. valsartan (DIOVAN) 80 mg tablet Take 80 mg by mouth daily. oxybutynin chloride XL (DITROPAN XL) 5 mg CR tablet Take 5 mg by mouth daily. Patient Follow Up Instructions:    Activity: Activity as tolerated  Diet: Resume previous diet    Follow-up Information     Follow up With Specialties Details Why Contact Info    Unknown, Provider  In 2 weeks  Patient not available to ask          ________________________________________________________________    Risk of deterioration: Low    Condition at Discharge:  Stable  __________________________________________________________________    Disposition  Home with family, no needs    ____________________________________________________________________    Code Status: Full Code  ___________________________________________________________________      Total time in minutes spent coordinating this discharge (includes going over instructions, follow-up, prescriptions, and preparing report for sign off to her PCP) :  40 minutes    Signed:  Marcus Lopez MD

## 2020-07-20 NOTE — PROGRESS NOTES
1910- Bedside report rcvd from Roc Buck, [facility]. Pt resting quietly in bed at this time. Pt updated on POC. No questions or concerns verbalized. Pt denies complaints at this time. VSS, NAD noted. SR up x3. Bed low, locked. Call light within reach. BFM on. Will continue to monitor. 4292- Bedside report given to Roc Buck RN. Pt resting in bed at this time. Pt denies complaints at this time. Pt updated on POC. No questions or concerns verbalized. SR up x3. Bed low, locked. Call light within reach. Will continue to monitor.

## 2020-07-20 NOTE — PROGRESS NOTES
Reason for Admission:  Patient came to ed with complaint of sob, cough and fever. She traveled to [place] on 4th of July weekend. RUR Score:   16%                  Plan for utilizing home health:    She has used home health services in the past for her knees. She has not used inpatient rehab in the past.       PCP: First and Last name:  Liza Cedeño     [facility] of [facility]: [facility]     Are you a current patient: Yes/No:  Yes     Approximate date of last visit:  July 17th 2020     Can you participate in a virtual visit with your PCP:  Yes                    Current Advanced Directive/Advance Care Plan: She is not interested in completing one at this time. Transition of Care Plan:   Patient lives in mobile home and her boyfriend stays with her. She states she was independent prior to coming to the hospital. She drives. She has a cpap machine at home however states she has not used it in years. Updated her pcp information in [facility]. Second medicare im letter reviewed with patient and she agrees to discharge. Telephone consent obtained due to covid 19 guidelines and copy placed with patient's medical records. Patient is being discharged home today. She states she has informed her boyfriend. Care Management Interventions  PCP Verified by CM: Yes  Mode of Transport at Discharge: Other (see comment)(Patient is being discharged today.)  Transition of Care Consult (CM Consult): Discharge Planning  Discharge Durable Medical Equipment: (Patient has cpap machine.)  Current Support Network: Other(Her boyfriend lives with her.)  Confirm Follow Up Transport: Friends  Discharge Location  Discharge Placement: Home        Naina [facility] [employer]        [phone]

## 2020-07-20 NOTE — ROUTINE PROCESS
Attempted to schedule PCP [facility] apt with Dr. Tran Murillo, left voicemail for patient coordinator [facility] Route and currently awaiting return call.

## 2020-07-20 NOTE — PROGRESS NOTES
0700: Bedside shift change report given to Marnette Fleischer (oncoming nurse) by Swapna Cain RN (offgoing nurse). Report included the following information SBAR, Kardex, Intake/Output and MAR.     0700: Patient in bed, resting quietly. Call bell in reach, will monitor. 0930: Dr. Nain Goldberg at bedside. Will plan to d/c patient today    1100: Discharge order placed, CM called and nothing to do from her standpoint, can print paperwork. 1140: Discharge teaching done, PIV removed. Patient getting dressed and will be transferred via wheelchair to her ride.

## 2020-07-21 ENCOUNTER — PATIENT OUTREACH (OUTPATIENT)
Dept: CASE MANAGEMENT | Age: 70
End: 2020-07-21

## 2020-07-21 NOTE — PROGRESS NOTES
Patient contacted regarding COVID-19 risk. Discussed COVID-19 related testing which was completed at this time. Test results were negative. Patient informed of results, if available? yes  Care Transition Nurse/ Ambulatory Care Manager contacted the pt by telephone to perform post discharge assessment. Verified name and  with pt as identifiers. Provided introduction to self, and explanation of the CTN/ACM role, and reason for call due to risk factors for infection and/or exposure to COVID-19. Symptoms reviewed with pt who verbalized the following symptoms: still with cough. Due to continued cough, pt contacted PCP Discussed follow-up appointments. If no appointment was previously scheduled, appointment scheduling offered: no  [address] follow up appointment(s): No future appointments. Non-[facility] follow up appointment(s): has appointment      Advance Care Planning:   Does patient have an Advance Directive: no- declined education     Patient has following risk factors of: bronchitis. CTN/ACM reviewed discharge instructions, medical action plan and red flags such as increased shortness of breath, increasing fever and signs of decompensation with pt who verbalized understanding. Discussed exposure protocols and quarantine with CDC Guidelines What to do if you are sick with coronavirus disease 2019.  pt was given an opportunity for questions and concerns. The pt agrees to contact the [facility] exposure line [phone], local health department  and PCP office for questions related to their healthcare. CTN/ACM provided contact information for future needs. Reviewed and educated pt on any new and changed medications related to discharge diagnosis. Patient/family/caregiver given information for [employer] and agrees to enroll no      14 day call based on severity of symptoms and risk factors.

## 2020-07-22 LAB
BACTERIA SPEC CULT: NORMAL
SERVICE CMNT-IMP: NORMAL

## 2020-08-03 ENCOUNTER — PATIENT OUTREACH (OUTPATIENT)
Dept: CASE MANAGEMENT | Age: 70
End: 2020-08-03

## 2020-08-03 NOTE — PROGRESS NOTES
Patient resolved from Transition of Care episode on 8/3/2020. CTN was unsuccessful at contacting this patient today. Patient/family was provided the following resources and education related to COVID-19 during the initial call:                         Signs, symptoms and red flags related to COVID-19            CDC exposure and quarantine guidelines            [facility] exposure contact - [phone]            Contact for their local Department of Health                 Patient has not had any additional ED or hospital visits. No further outreach scheduled with this CTN. Episode of Care resolved. Patient has this CTN contact information if future needs arise.
Abdomen soft, non-tender, no guarding.

## 2020-08-04 ENCOUNTER — PATIENT OUTREACH (OUTPATIENT)
Dept: CASE MANAGEMENT | Age: 70
End: 2020-08-04

## 2020-08-04 NOTE — PROGRESS NOTES
Patient resolved from Transition of Care episode on 8/4/2020. Patient/family has been provided the following resources and education related to COVID-19:                         Signs, symptoms and red flags related to COVID-19            CDC exposure and quarantine guidelines             Patient currently reports that the following symptoms have improved:  cough. Patient states cough is better, only coughing maybe once a day. No further outreach scheduled with this CTN/ACM/LPN/HC/ MA. Episode of Care resolved. Patient has this CTN/ACM/LPN/HC/MA contact information if future needs arise.

## 2022-02-26 ENCOUNTER — OFFICE VISIT (OUTPATIENT)
Dept: URGENT CARE | Facility: CLINIC | Age: 72
End: 2022-02-26
Payer: MEDICARE

## 2022-02-26 VITALS
HEART RATE: 81 BPM | DIASTOLIC BLOOD PRESSURE: 74 MMHG | RESPIRATION RATE: 22 BRPM | SYSTOLIC BLOOD PRESSURE: 149 MMHG | TEMPERATURE: 99 F | HEIGHT: 67 IN | BODY MASS INDEX: 42.38 KG/M2 | WEIGHT: 270 LBS | OXYGEN SATURATION: 94 %

## 2022-02-26 DIAGNOSIS — R06.02 SHORTNESS OF BREATH: ICD-10-CM

## 2022-02-26 DIAGNOSIS — J18.9 COMMUNITY ACQUIRED PNEUMONIA, UNSPECIFIED LATERALITY: Primary | ICD-10-CM

## 2022-02-26 DIAGNOSIS — R05.9 COUGH: ICD-10-CM

## 2022-02-26 DIAGNOSIS — J45.51 SEVERE PERSISTENT ASTHMA WITH EXACERBATION: ICD-10-CM

## 2022-02-26 PROBLEM — F31.70 BIPOLAR AFFECTIVE DISORDER IN REMISSION: Status: ACTIVE | Noted: 2022-02-26

## 2022-02-26 PROBLEM — I10 ESSENTIAL (PRIMARY) HYPERTENSION: Status: ACTIVE | Noted: 2022-02-26

## 2022-02-26 PROBLEM — E11.9 TYPE 2 DIABETES MELLITUS WITHOUT COMPLICATION, WITHOUT LONG-TERM CURRENT USE OF INSULIN: Status: ACTIVE | Noted: 2022-02-26

## 2022-02-26 LAB
CTP QC/QA: YES
SARS-COV-2 RDRP RESP QL NAA+PROBE: NEGATIVE

## 2022-02-26 PROCEDURE — 4010F PR ACE/ARB THEARPY RXD/TAKEN: ICD-10-PCS | Mod: CPTII,S$GLB,, | Performed by: FAMILY MEDICINE

## 2022-02-26 PROCEDURE — 3008F PR BODY MASS INDEX (BMI) DOCUMENTED: ICD-10-PCS | Mod: CPTII,S$GLB,, | Performed by: FAMILY MEDICINE

## 2022-02-26 PROCEDURE — 99204 PR OFFICE/OUTPT VISIT, NEW, LEVL IV, 45-59 MIN: ICD-10-PCS | Mod: 25,S$GLB,CS, | Performed by: FAMILY MEDICINE

## 2022-02-26 PROCEDURE — 71046 X-RAY EXAM CHEST 2 VIEWS: CPT | Mod: S$GLB,,, | Performed by: RADIOLOGY

## 2022-02-26 PROCEDURE — 3077F PR MOST RECENT SYSTOLIC BLOOD PRESSURE >= 140 MM HG: ICD-10-PCS | Mod: CPTII,S$GLB,, | Performed by: FAMILY MEDICINE

## 2022-02-26 PROCEDURE — 1159F MED LIST DOCD IN RCRD: CPT | Mod: CPTII,S$GLB,, | Performed by: FAMILY MEDICINE

## 2022-02-26 PROCEDURE — U0002: ICD-10-PCS | Mod: QW,S$GLB,, | Performed by: FAMILY MEDICINE

## 2022-02-26 PROCEDURE — 3078F PR MOST RECENT DIASTOLIC BLOOD PRESSURE < 80 MM HG: ICD-10-PCS | Mod: CPTII,S$GLB,, | Performed by: FAMILY MEDICINE

## 2022-02-26 PROCEDURE — 94640 AIRWAY INHALATION TREATMENT: CPT | Mod: 59,S$GLB,, | Performed by: FAMILY MEDICINE

## 2022-02-26 PROCEDURE — 3078F DIAST BP <80 MM HG: CPT | Mod: CPTII,S$GLB,, | Performed by: FAMILY MEDICINE

## 2022-02-26 PROCEDURE — 3077F SYST BP >= 140 MM HG: CPT | Mod: CPTII,S$GLB,, | Performed by: FAMILY MEDICINE

## 2022-02-26 PROCEDURE — 71046 XR CHEST PA AND LATERAL: ICD-10-PCS | Mod: S$GLB,,, | Performed by: RADIOLOGY

## 2022-02-26 PROCEDURE — 99204 OFFICE O/P NEW MOD 45 MIN: CPT | Mod: 25,S$GLB,CS, | Performed by: FAMILY MEDICINE

## 2022-02-26 PROCEDURE — 1159F PR MEDICATION LIST DOCUMENTED IN MEDICAL RECORD: ICD-10-PCS | Mod: CPTII,S$GLB,, | Performed by: FAMILY MEDICINE

## 2022-02-26 PROCEDURE — 1160F RVW MEDS BY RX/DR IN RCRD: CPT | Mod: CPTII,S$GLB,, | Performed by: FAMILY MEDICINE

## 2022-02-26 PROCEDURE — 94640 PR INHAL RX, AIRWAY OBST/DX SPUTUM INDUCT: ICD-10-PCS | Mod: 59,S$GLB,, | Performed by: FAMILY MEDICINE

## 2022-02-26 PROCEDURE — 3008F BODY MASS INDEX DOCD: CPT | Mod: CPTII,S$GLB,, | Performed by: FAMILY MEDICINE

## 2022-02-26 PROCEDURE — 4010F ACE/ARB THERAPY RXD/TAKEN: CPT | Mod: CPTII,S$GLB,, | Performed by: FAMILY MEDICINE

## 2022-02-26 PROCEDURE — U0002 COVID-19 LAB TEST NON-CDC: HCPCS | Mod: QW,S$GLB,, | Performed by: FAMILY MEDICINE

## 2022-02-26 PROCEDURE — 1160F PR REVIEW ALL MEDS BY PRESCRIBER/CLIN PHARMACIST DOCUMENTED: ICD-10-PCS | Mod: CPTII,S$GLB,, | Performed by: FAMILY MEDICINE

## 2022-02-26 RX ORDER — AZITHROMYCIN 250 MG/1
TABLET, FILM COATED ORAL
Qty: 6 TABLET | Refills: 0 | Status: SHIPPED | OUTPATIENT
Start: 2022-02-26 | End: 2022-03-03

## 2022-02-26 RX ORDER — ALBUTEROL SULFATE 0.83 MG/ML
2.5 SOLUTION RESPIRATORY (INHALATION)
Status: COMPLETED | OUTPATIENT
Start: 2022-02-26 | End: 2022-02-26

## 2022-02-26 RX ORDER — PREDNISONE 20 MG/1
20 TABLET ORAL DAILY
Qty: 3 TABLET | Refills: 0 | Status: SHIPPED | OUTPATIENT
Start: 2022-02-26 | End: 2022-03-01

## 2022-02-26 RX ORDER — AMOXICILLIN AND CLAVULANATE POTASSIUM 562.5; 437.5; 62.5 MG/1; MG/1; MG/1
1 TABLET, FILM COATED, EXTENDED RELEASE ORAL EVERY 12 HOURS
Qty: 14 TABLET | Refills: 0 | Status: SHIPPED | OUTPATIENT
Start: 2022-02-26 | End: 2022-03-05

## 2022-02-26 RX ORDER — IPRATROPIUM BROMIDE 0.5 MG/2.5ML
0.5 SOLUTION RESPIRATORY (INHALATION)
Status: COMPLETED | OUTPATIENT
Start: 2022-02-26 | End: 2022-02-26

## 2022-02-26 RX ORDER — ALBUTEROL SULFATE 90 UG/1
2 AEROSOL, METERED RESPIRATORY (INHALATION) EVERY 6 HOURS PRN
Qty: 18 G | Refills: 0 | Status: SHIPPED | OUTPATIENT
Start: 2022-02-26 | End: 2023-02-26

## 2022-02-26 RX ADMIN — ALBUTEROL SULFATE 2.5 MG: 0.83 SOLUTION RESPIRATORY (INHALATION) at 05:02

## 2022-02-26 RX ADMIN — IPRATROPIUM BROMIDE 0.5 MG: 0.5 SOLUTION RESPIRATORY (INHALATION) at 05:02

## 2022-02-26 NOTE — PATIENT INSTRUCTIONS
General Discharge Instructions   PLEASE READ YOUR DISCHARGE INSTRUCTIONS ENTIRELY AS IT CONTAINS IMPORTANT INFORMATION.  If you were prescribed a narcotic or controlled medication, do not drive or operate heavy equipment or machinery while taking these medications.  If you were prescribed antibiotics, please take them to completion.  You must understand that you've received an Urgent Care treatment only and that you may be released before all your medical problems are known or treated. You, the patient, will arrange for follow up care as instructed.    OVER THE COUNTER RECOMMENDATIONS/SUGGESTIONS.    Make sure to stay well hydrated.    Use Nasal Saline to mechanically move any post nasal drip from your eustachian tube or from the back of your throat.    Use warm salt water gargles to ease your throat pain. Warm salt water gargles as needed for sore throat- 1/2 tsp salt to 1 cup warm water, gargle as desired.    Use an antihistamine such as Claritin, Zyrtec or Allegra to dry you out.    Use pseudoephedrine (behind the counter) to decongest. Pseudoephedrine 30 mg up to 240 mg /day. It can raise your blood pressure and give you palpitations.    Use mucinex (guaifenesin) to break up mucous up to 2400mg/day to loosen any mucous.    The mucinex DM pill has a cough suppressant that can be sedating. It can be used at night to stop the tickle at the back of your throat.    You can use Mucinex D (it has guaifenesin and a high dose of pseudoephedrine) in the mornings to help decongest.    Use Afrin in each nare for no longer than 3 days, as it is addictive. It can also dry out your mucous membranes and cause elevated blood pressure. This is especially useful if you are flying.    Use Flonase 1-2 sprays/nostril per day. It is a local acting steroid nasal spray, if you develop a bloody nose, stop using the medication immediately.    Sometimes Nyquil at night is beneficial to help you get some rest, however it is sedating and it  does have an antihistamine, and tylenol.    Honey is a natural cough suppressant that can be used.    Tylenol up to 4,000 mg a day is safe for short periods and can be used for body aches, pain, and fever. However in high doses and prolonged use it can cause liver irritation.    Ibuprofen is a non-steroidal anti-inflammatory that can be used for body aches, pain, and fever.However it can also cause stomach irritation if over used.     Follow up with your PCP or specialty clinic as instructed in the next 2-3 days if not improved or as needed. You can call [phone] to schedule an appointment with appropriate provider.      If you condition worsens, we recommend that you receive another evaluation at the emergency room immediately or contact your primary medical clinic's after hours call service to discuss your concerns.      Please return here or go to the Emergency Department for any concerns or worsening condition.   You can also call [phone] to schedule an appointment with the appropriate provider.    Please return here or go to the Emergency Department for any concerns or worsening of condition.    Thank you for choosing [facility]!    Our goal in the Urgent Care is to always provide outstanding medical care. You may receive a survey by mail or e-mail in the next week regarding your experience today. We would greatly appreciate you completing and returning the survey. Your feedback provides us with a way to recognize our staff who provide very good care, and it helps us learn how to improve when your experience was below our aspiration of excellence.      We appreciate you trusting us with your medical care. We hope you feel better soon. We will be happy to take care of you for all of your future medical needs.    Sincerely,    JOSE ANGEL Lam  NEGATIVE COVID TEST  -You have tested negative for COVID-19 today.  If you did not have a close exposure (as defined below) you can return to  "your normal daily activities to include social distancing, wearing a mask and frequent handwashing.  -A "close exposure" is defined as anyone who has had an exposure (masked or unmasked) to a known COVID -19 positive person within 6 ft for longer than 15 minutes. If your exposure meets this definition, you are required by CDC guidelines to quarantine for at least 7-10 days from time of exposure.  -The CDC states that a test can be performed for an asymptomatic patient (someone who does not have any symptoms) after a close exposure, and that a test should be done if you develop symptoms after a close exposure as described above.  -Specifically, you can test at day 5 or later if asymptomatic in order to get released from quarantine on day 7 or later.  If you develop symptoms sooner, you should test when your symptoms start.  -If you developed symptoms since the exposure, and your test was negative today and less than 5 days from your exposure, you still have to quarantine for 7-10 days from the date of the exposure.  -The 7-10 day quarantine begins from the day you were exposed, not the day of your test.  For example, if your exposure was on a Monday, and you waited until Friday of the same week to get tested and it was negative, your 7-10 day quarantine begins from that Monday, not the Friday you tested negative.  -Please note, if you decide to test as an asymptomatic during your quarantine and you are positive, you will be restarting your quarantine and moving from a possible 10 day quarantine (if you do not test), to a 11 day or greater quarantine.  When to Seek Emergency Medical Attention  Look for emergency warning signs* for COVID-19. If someone is showing any of these signs, seek emergency medical care immediately:    Trouble breathing  Persistent pain or pressure in the chest  New confusion  Inability to wake or stay awake  Pale, gray, or blue-colored skin, lips, or nail beds, depending on skin tone  *This " list is not all possible symptoms. Please call your medical provider for any other symptoms that are severe or concerning to you.    Call 911 or call ahead to your local emergency facility: Notify the  that you are seeking care for someone who has or may have COVID-19.

## 2022-02-26 NOTE — PROGRESS NOTES
"Subjective:       Patient ID: Farhana Tong is a 71 y.o. female.    Vitals:  height is 5' 7" (1.702 m) and weight is 122.5 kg (270 lb). Her temperature is 98.5 °F (36.9 °C). Her blood pressure is 149/74 (abnormal) and her pulse is 81. Her respiration is 22 (abnormal) and oxygen saturation is 94% (abnormal).     Chief Complaint: Cough    71-year-old female presents with complaints of shortness of breath and cough that started about 3 days ago.  She reports a history of diabetes, and hypertension.  She reports she has a history of pneumonia in the past.  She has been in town for the past month she is from [place] and she is in town for [facility]. Pt does not have her list of medications with her from home.  Patient reports has a history of asthma and has for got her medications at home.  She does report that she is compliant with her diabetic medication that is metformin and her blood pressure medication.  She reports her symptoms started after switching to a cooler air environment with air condition over the past few days.    Cough  This is a new problem. The current episode started in the past 7 days. The problem has been gradually worsening. The problem occurs constantly. The cough is productive of sputum. Associated symptoms include postnasal drip, shortness of breath and wheezing. Pertinent negatives include no chest pain, chills, ear congestion, ear pain, fever, headaches, heartburn, hemoptysis, myalgias, nasal congestion, rash, rhinorrhea, sore throat, sweats or weight loss. Nothing aggravates the symptoms. She has tried nothing for the symptoms. Her past medical history is significant for asthma and pneumonia. There is no history of bronchiectasis, bronchitis, COPD, emphysema or environmental allergies.       Constitution: Negative for chills and fever.   HENT: Positive for congestion and postnasal drip. Negative for ear pain and sore throat.    Cardiovascular: Negative for chest pain.   Respiratory: Positive " for cough, shortness of breath, wheezing and asthma. Negative for bloody sputum.    Gastrointestinal: Negative for heartburn.   Musculoskeletal: Negative for muscle ache.   Skin: Negative for rash.   Allergic/Immunologic: Positive for asthma. Negative for environmental allergies.   Neurological: Negative for headaches.       Objective:      Physical Exam   Constitutional: She is oriented to person, place, and time. She appears well-developed.  Non-toxic appearance. She appears ill. She appears distressed. obesity  HENT:   Head: Normocephalic and atraumatic.   Ears:   Right Ear: External ear normal.   Left Ear: External ear normal.   Nose: Nose normal.   Mouth/Throat: Oropharynx is clear and moist.   Eyes: EOM and lids are normal.   Neck: Trachea normal and phonation normal. Neck supple.   Cardiovascular: Tachycardia present.   Pulmonary/Chest: Accessory muscle usage present. Tachypnea noted. She has wheezes.   Musculoskeletal: Normal range of motion.         General: Normal range of motion.   Neurological: She is alert and oriented to person, place, and time.   Skin: Skin is warm, dry, intact and not diaphoretic.   Psychiatric: Her speech is normal and behavior is normal. Judgment and thought content normal.   Nursing note and vitals reviewed.        Assessment:       1. Community acquired pneumonia, unspecified laterality    2. Cough    3. Shortness of breath    4. Severe persistent asthma with exacerbation        5:33 PM duo neb administered  5:41 PM vitals recheck, blood pressure 149/74 heart rate 81.   5:55 PM she reports she feels better after treatment    Results for orders placed or performed in visit on 02/26/22   POCT COVID-19 Rapid Screening   Result Value Ref Range    POC Rapid COVID Negative Negative     Acceptable Yes      XR CHEST PA AND LATERAL    Result Date: 2/26/2022  EXAMINATION: XR CHEST PA AND LATERAL CLINICAL HISTORY: Cough, unspecified TECHNIQUE: PA and lateral views of the  chest were performed. COMPARISON: None FINDINGS: The lungs are well expanded and clear. No focal opacities are seen. The pleural spaces are clear. The cardiac silhouette is borderline enlarged.  There are atherosclerotic calcifications of the aortic arch. The visualized osseous structures are intact.     No acute abnormality. Electronically signed by: Ruddy Mejia Date:    02/26/2022 Time:    17:42    Plan:       Patient presented tachypnic, oxygen level 94%.  She does does report a history of asthma and has not had her albuterol since she has been in town.  Advised that I recommend that she should go to the emergency department for further evaluation but she is not interested in the emergency department at time she would like to go home and see if she can get better with the medications.  I stressed that if she gets any worse she must go to the emergency department.  Patient feels better after the DuoNeb, I have refilled her albuterol, treatment for CAPD has been sent, and short course of steroids for asthma exacerbation as well.  I have stressed for her to follow-up in the next 1-2 days here for a re-evaluation and she agreed.  Wheezes in all lung fields    Discussed results/diagnosis/plan with patient in clinic. Strict precautions given to patient to monitor for worsening signs and symptoms. Advised to follow up with PCP or specialist.    Explained side effects of medications prescribed with patient and informed him/her to discontinue use if he/she has any side effects and to inform UC or PCP if this occurs. All questions answered. Strict ED verses clinic return precautions stressed and given in depth. Advised if symptoms worsens of fail to improve he/she should go to the Emergency Room. Discharge and follow-up instructions given verbally/printed with the patient who expressed understanding and willingness to comply with my recommendations. Patient voiced understanding and in agreement with current treatment  plan. Patient exits the exam room in no acute distress. Conversant and engaged during discharge discussion, verbalized understanding.      Community acquired pneumonia, unspecified laterality  -     amoxicillin-clavulanate 1,000-62.5 mg (AUGMENTIN XR) 1,000-62.5 mg per tablet; Take 1 tablet by mouth every 12 (twelve) hours. for 7 days  Dispense: 14 tablet; Refill: 0  -     azithromycin (Z-CARLOS EDUARDO) 250 MG tablet; Take 2 tablets by mouth on day 1; Take 1 tablet by mouth on days 2-5  Dispense: 6 tablet; Refill: 0    Cough  -     POCT COVID-19 Rapid Screening  -     XR CHEST PA AND LATERAL; Future; Expected date: 02/26/2022    Shortness of breath  -     albuterol nebulizer solution 2.5 mg  -     ipratropium 0.02 % nebulizer solution 0.5 mg    Severe persistent asthma with exacerbation  -     predniSONE (DELTASONE) 20 MG tablet; Take 1 tablet (20 mg total) by mouth once daily. Take with food for 3 days  Dispense: 3 tablet; Refill: 0  -     albuterol (VENTOLIN HFA) 90 mcg/actuation inhaler; Inhale 2 puffs into the lungs every 6 (six) hours as needed for Wheezing. Rescue  Dispense: 18 g; Refill: 0           Medical Decision Making:   History:   Old Medical Records: I decided to obtain old medical records.  Old Records Summarized: records from clinic visits and other records.  Clinical Tests:   Lab Tests: Ordered and Reviewed  Radiological Study: Ordered and Reviewed    Additional MDM:   PERC Rule:   Age is greater than or equal to 50 = 1.0  Heart Rate is greater than or equal to 100 = 0.0  SaO2 on room air < 95% = 1.0  Unilateral leg swelling = 0.0  Hemoptysis = 0.0  Recent surgery or trauma = 0.0  Prior PE or DVT =  0.0  Hormone use = 0.00  PERC Score = 2    Well's Criteria Score:  -Clinical symptoms of DVT (leg swelling, pain with palpation) = 0.0  -Other diagnosis less likely than pulmonary embolism =            0.0  -Heart Rate >100 =   0.0  -Immobilization (= or > than 3 days) or surgery in the previous 4 weeks =  0.0  -Previous DVT/PE = 0.0  -Hemoptysis =          0.0  -Malignancy =           0.0  Well's Probability Score =    0          Community-Acquired Pneumonia Discharge Instructions, Adult   About this topic   Pneumonia is an infection in your lungs. Bacteria, viruses, or other germs can cause this infection. The doctor may order antibiotics or other medicines, based on the cause of your illness. You should start to feel better within about a week. You may feel tired and have a cough for a longer time.  If you get this infection when you are in the hospital, it is called hospital-acquired pneumonia. If this happens when you have not been in a hospital or healthcare facility, it is called community-acquired pneumonia. If you have mild signs, you may not feel sick at all. You are still able to walk around and do your daily activities. You may also hear this called walking pneumonia. Some people may have more severe signs. Your doctor will treat your signs.     What care is needed at home?   · Ask your doctor what you need to do when you go home. Make sure you ask questions if you do not understand what the doctor says.  · If you smoke, try to quit. Your doctor or nurse can help you with this.  · Stay away from smoke-filled places. Avoid other things that may cause breathing problems like fumes, pollution, dust, and other common allergens.  · If you have an inhaler to take when you are feeling short of breath, be sure to carry it with you all the time. Then, you can take it when needed. Take all your other medicines as directed.  · Drink lots of water, juice, or broth to replace fluids lost through runny nose and fever.  · Take warm, steamy showers to help soothe the cough.  · Use a cool mist humidifier. This will make it easier to breathe.  · Use hard candy or cough drops to soothe sore throat and cough.  · Wash your hands often. This will help keep others healthy.  What follow-up care is needed?   Your doctor may ask  you to make visits to the office to check on your progress. Be sure to keep these visits.  What drugs may be needed?   The doctor may order drugs to:  · Treat infection  · Loosen secretions  · Lower fever  · Control coughing  · Help with swelling in your airways and lungs  You may be given other drugs based on your condition. These may include inhalers and steroids. Your doctor will talk with you about the drugs you may need to take and how to take them.  Will physical activity be limited?   · You may have to limit your activity. It is important to get enough rest while recovering from your illness.  · Talk with your doctor about when you can return to your normal activities.  What can be done to prevent this health problem?   · Always cover your cough with the inside of your arm.  · Wash your hands often with soap and water for at least 20 seconds, especially after coughing or sneezing. Alcohol-based hand sanitizers also work.       · Do not get too close (kissing, hugging) to people who are sick. Ask visitors who have colds to wear a mask.  · If you have a cold, stay home from work or school. Wear a mask to help from spreading the infection.  · Do not share towels or hankies with anyone who is sick.  · Eat a healthy diet and exercise regularly to keep your immune system strong.  · Get a flu shot each year. Ask your doctor if you need a pneumonia shot or any other vaccines.     When do I need to call the doctor?   · You are having so much trouble breathing that you can only say one or two words at a time.  · You need to sit upright at all times to be able to breathe and/or cannot lie down.  · You are coughing up a lot of blood (more than 1 teaspoon or 5 mL).  · You have a fever of 100.4°F (38°C) or higher or chills, even after taking your medicines.  · You have trouble breathing when talking or sitting still.  · Your shortness of breath has not gotten better after a few days.  · You cough up a small amount of blood  (less than 1 teaspoon or 5 mL).  · Your symptoms are not getting better in 3 to 4 days.  · You are still coughing in 3 to 4 weeks.  Teach Back: Helping You Understand   The Teach Back Method helps you understand the information we are giving you. After you talk with the staff, tell them in your own words what you learned. This helps to make sure the staff has described each thing clearly. It also helps to explain things that may have been confusing. Before going home, make sure you can do these:  · I can tell you about my condition.  · I can tell you what I can do to help avoid passing the infection to others.  · I can tell you what I will do if I have more trouble breathing, feel short of breath at rest, or my cough does not get better.  Where can I learn more?   American Academy of Family Physicians  https://[email or web address].org/condition/pneumonia/   American Lung Association  http://www.lungusa.org/lung-disease/pneumonia/understanding-pneumonia.html   Centers for Disease Control and Prevention  https://www.cdc.gov/pneumonia/causes.html   Last Reviewed Date   2021-06-10  Consumer Information Use and Disclaimer   This information is not specific medical advice and does not replace information you receive from your health care provider. This is only a brief summary of general information. It does NOT include all information about conditions, illnesses, injuries, tests, procedures, treatments, therapies, discharge instructions or life-style choices that may apply to you. You must talk with your health care provider for complete information about your health and treatment options. This information should not be used to decide whether or not to accept your health care providers advice, instructions or recommendations. Only your health care provider has the knowledge and training to provide advice that is right for you.  Copyright   Copyright © 2021 UpToDate, Inc. and its affiliates and/or licensors. All rights  "reserved.     Patient Education        Community-Acquired Pneumonia in Adults   The Basics   Written by the doctors and editors at [facility]   What is pneumonia? -- Pneumonia is a lung infection that can cause coughing, fever, and trouble breathing (figure 1). The lung infection is often caused by bacteria, but it can also be caused by viruses or other germs.  Doctors use the term "community-acquired" when a person catches an infection in their daily life, and not from being in the hospital. Doctors call it "hospital-acquired" when people catch an infection from being in the hospital.  Community-acquired pneumonia can be mild or severe. A mild infection is sometimes called "walking pneumonia." That's because most people with walking pneumonia are not very sick and can still walk around and do their daily activities.  What are the symptoms of community-acquired pneumonia? -- Common symptoms include:  · Cough - People sometimes cough up mucus (sputum).  · Fever  · Chest pain, especially when taking a deep breath  · A fast heartbeat  · Shaking chills  Should I see a doctor or nurse? -- Yes. If you have the symptoms listed above, see a doctor or nurse as soon as possible.  Will I need tests? -- Probably. Your doctor or nurse will ask about your symptoms and do an exam. They will probably do a chest X-ray to look for an infection in your lungs.  Depending on your individual situation, you might need other tests. These can include blood tests or lab tests on a sample of mucus that you cough up.  How is community-acquired pneumonia treated? -- Doctors treat community-acquired pneumonia with antibiotic medicines. These medicines kill the germs that are causing the infection. Most people can take antibiotic pills at home, but some people need to be treated in the hospital. People who are treated in the hospital usually get antibiotics through a thin tube that goes into their vein, called an "IV." Some people who are treated " in the hospital also get extra oxygen to help them breathe more easily.  Most people start to feel better within 3 to 5 days of taking their medicine. But a cough from pneumonia can last weeks or months after treatment. If your symptoms do not improve or get worse after starting treatment, tell your doctor or nurse.  Is there anything else I can do to take care of myself? -- Yes. You can:  · Get plenty of rest  · Drink plenty of fluids  What can I do to keep from getting pneumonia again? -- To avoid germs, you can wash your hands often with soap and water, or use alcohol hand rubs.  You can also get certain vaccines to help keep you from getting pneumonia again. Vaccines can prevent certain serious or deadly infections. You should get the flu vaccine every year. Depending on your individual situation, your doctor might also recommend that you get the pneumococcal vaccine. This can help keep you from getting an infection from the kind of bacteria that most commonly causes pneumonia.  If you smoke, quitting smoking is also an important way to help prevent pneumonia. In general, a healthy lifestyle, including eating a healthy diet and getting regular exercise, can also help prevent many problems, including pneumonia.   All topics are updated as new evidence becomes available and our peer review process is complete.  This topic retrieved from [employer] on: Sep 21, 2021.  Topic 57204 Version 10.0  Release: 29.4.2 - C29.263  © 2021 UpToDate, Inc. and/or its affiliates. All rights reserved.  figure 1: Pneumonia     Pneumonia is an infection of the lungs. When you have pneumonia, the air sacs in your lungs become inflamed. They can fill with fluid and cells trying to fight the infection. This can make it hard to breathe.  Graphic 56999 Version 8.0     Consumer Information Use and Disclaimer   This information is not specific medical advice and does not replace information you receive from your health care provider. This is  "only a brief summary of general information. It does NOT include all information about conditions, illnesses, injuries, tests, procedures, treatments, therapies, discharge instructions or life-style choices that may apply to you. You must talk with your health care provider for complete information about your health and treatment options. This information should not be used to decide whether or not to accept your health care provider's advice, instructions or recommendations. Only your health care provider has the knowledge and training to provide advice that is right for you. The use of this information is governed by the [employer] End User License Agreement, available at https://www.[employer]/en/solutions/[employer]/about/moreno.The use of [employer] content is governed by the [employer] Terms of Use. ©2021 UpToDate, Inc. All rights reserved.  Copyright   © 2021 UpToDate, Inc. and/or its affiliates. All rights reserved.     Patient Education        Asthma in Adults   The Basics   Written by the doctors and editors at [facility]   What is asthma? -- Asthma is a condition that can make it hard to breathe. Asthma symptoms can be mild or severe. And they can come and go. Sometimes asthma symptoms start all of a sudden. Asthma attacks happen when the airways in the lungs become narrow and inflamed (figure 1). Asthma can run in families.  How should I manage my asthma during the COVID-19 pandemic? -- COVID-19 stands for "coronavirus disease 2019." It is caused by a virus called SARS-CoV-2. The virus first appeared in late 2019 and has since spread throughout the world.  People with COVID-19 can have fever, cough, and other symptoms. In severe cases, it can cause pneumonia and trouble breathing. Some people with asthma might be more likely to have serious symptoms if they get COVID-19. If you have asthma, it's especially important to get the COVID-19 vaccine as soon as you are able. This will greatly lower your risk of " "getting sick.  If you take medicines to control your asthma or treat asthma attacks, it's important to keep taking them as usual. If you have symptoms of COVID-19, or think you might have been exposed to the virus, call your doctor or nurse.  The rest of this article has general information about asthma.  What are the symptoms of asthma? -- Asthma symptoms can include:  · Wheezing or noisy breathing  · Coughing  · A tight feeling in the chest  · Shortness of breath  Symptoms can happen each day, each week, or less often. Symptoms can range from mild to severe. Although it is rare, an episode of asthma can sometimes even lead to death.  Is there a test for asthma? -- Yes. Your doctor will ask you about your symptoms and have you do a breathing test to see how your lungs are working.   If your doctor thinks allergies might be making your asthma worse, they might suggest allergy testing. This can include skin tests or blood tests.   How is asthma treated? -- Asthma is treated with different types of medicines. The medicines can be inhalers, liquids, or pills. Your doctor will prescribe medicine based on how often you have symptoms and how serious your symptoms are. Asthma medicines work in 1 of 2 ways:  · Quick-relief medicines stop symptoms quickly - in 5 to 15 minutes. Almost everyone with asthma has a quick-relief inhaler that they carry with them. People use these medicines whenever they have asthma symptoms. Most people need these medicines 1 or 2 times a week - or less often. But when asthma symptoms get worse, more doses might be needed.   Some people can feel shaky after taking these medicines. A few people also need a machine called a "nebulizer" to breathe in their medicine.  · Long-term controller medicines control asthma and prevent future symptoms. People who get asthma symptoms more than 2 times a week need to use a controller medicine 1 or 2 times each day.   Controller medicines tend to take longer to " "work, sometimes a few weeks. So, it can be hard to tell if the medicine is working. Keep taking the medicine, but talk to your doctor or nurse if you do not feel a medicine working.  It is very important that you take all the medicines the doctor prescribes, exactly how you are supposed to take them. You might have to take medicines a few times a day. Your doctor or nurse will show you the right way to use your inhaler.   If your symptoms get much worse all of a sudden, use your quick relief medicine and contact your doctor or nurse. You might need to go to the hospital for treatment.  What is an asthma action plan? -- An asthma action plan is a list of instructions that tell you:  · Which medicines to use each day at home  · Which medicines to take if your symptoms get worse  · When to get help or call for an ambulance (in the  and [place], dial [phone])  If you have frequent or severe asthma symptoms, your doctor might suggest that you have an asthma action plan. If so, you and your doctor will work together to make one. As part of your action plan, you might need to use something called a "peak flow meter." Breathing into this device will show how your lungs are working. Your doctor will show you the right way to use your peak flow meter.  Should I see a doctor or nurse? -- Yes. See your doctor or nurse if you have asthma symptoms. And call your doctor or nurse if you have an asthma attack and the symptoms do not improve or get worse after using a quick-relief medicine.   If asthma symptoms are severe, call for an ambulance (in the  and [place], dial [phone]).  If you need asthma medicine every day, you should see your doctor or nurse every 6 months or more often.  Can asthma symptoms be prevented? -- Yes. You can help prevent your asthma symptoms. You can stay away from things that cause your symptoms or make them worse. Doctors call these "triggers." If you know what your triggers are, avoid them as much as " "possible. Below are some common triggers, but your triggers might be different. Ask your doctor or nurse which are important for you:  · Cigarette smoke - Avoid places where people smoke. If you smoke, get help to quit.  · Exercise - Exercise can be good for people with asthma even if it is a trigger. But you might need to take an extra dose of your quick-relief inhaler medicine before you exercise. It might help to warm up before doing intense exercise. If you exercise outside on a very cold day, it can also help to wear a loose scarf or mask over your nose and mouth.  · Dust - Mattress and pillow covers can reduce dust mites.  · Mold - Use a dehumidifier or air conditioner to keep indoor air dry. Remove any mold you see.  · Certain animals - These can include dogs, cats, mice, or cockroaches. If you are allergic to animals or insects, try to figure out ways to avoid them.  · Pollen - Stay indoors when possible during pollen season. Keep your windows and doors closed whenever you can.  · Getting sick with a cold or flu - Make sure to get a flu shot every year. Some people also need to get a vaccine to help prevent pneumonia. If you think you might have been exposed to the flu, tell your doctor or nurse. They might prescribe special medicine (called "antiviral" medicine).  · Stress  Some adults with asthma have worse symptoms if they take aspirin or medicines called NSAIDs. NSAIDs include ibuprofen (sample brand names: Advil, Motrin) and naproxen (sample brand names: Aleve, Naprosyn). Ask your doctor if you need to avoid these medicines.  If you can't avoid certain triggers, talk with your doctor about what you can do. For example, you might need to take an extra dose of your quick-relief inhaler medicine before you exercise or are around pollen or animals you are allergic to.  What if I want to get pregnant? -- If you want to get pregnant, talk to your doctor about how to control your asthma. Keeping your asthma " well-controlled is important for the health of your baby. Most asthma medicines are safe to take if you are pregnant.  All topics are updated as new evidence becomes available and our peer review process is complete.  This topic retrieved from [employer] on: Sep 21, 2021.  Topic 99839 Version 15.0  Release: 29.4.2 - C29.263  © 2021 UpToDate, Inc. and/or its affiliates. All rights reserved.  figure 1: Asthma     During an asthma attack or flare-up, the muscles around the airways tighten (constrict), and the lining of the airways gets inflamed. Then, mucus builds up. All of this makes it hard to breathe.  Graphic 76611 Version 9.0     Consumer Information Use and Disclaimer   This information is not specific medical advice and does not replace information you receive from your health care provider. This is only a brief summary of general information. It does NOT include all information about conditions, illnesses, injuries, tests, procedures, treatments, therapies, discharge instructions or life-style choices that may apply to you. You must talk with your health care provider for complete information about your health and treatment options. This information should not be used to decide whether or not to accept your health care provider's advice, instructions or recommendations. Only your health care provider has the knowledge and training to provide advice that is right for you. The use of this information is governed by the [employer] End User License Agreement, available at https://www.[employer]/en/solutions/[employer]/about/moreno.The use of [employer] content is governed by the [employer] Terms of Use. ©2021 UpToDate, Inc. All rights reserved.  Copyright   © 2021 UpToDate, Inc. and/or its affiliates. All rights reserved.

## 2022-03-19 PROBLEM — R09.02 HYPOXIA: Status: ACTIVE | Noted: 2020-07-16

## 2022-09-08 ENCOUNTER — OFFICE VISIT (OUTPATIENT)
Dept: CARDIOLOGY CLINIC | Age: 72
End: 2022-09-08
Payer: COMMERCIAL

## 2022-09-08 VITALS
OXYGEN SATURATION: 99 % | HEART RATE: 74 BPM | BODY MASS INDEX: 37.59 KG/M2 | SYSTOLIC BLOOD PRESSURE: 146 MMHG | WEIGHT: 248 LBS | RESPIRATION RATE: 18 BRPM | DIASTOLIC BLOOD PRESSURE: 70 MMHG | HEIGHT: 68 IN

## 2022-09-08 DIAGNOSIS — I42.9 CARDIOMYOPATHY, UNSPECIFIED TYPE (HCC): ICD-10-CM

## 2022-09-08 DIAGNOSIS — I10 PRIMARY HYPERTENSION: ICD-10-CM

## 2022-09-08 DIAGNOSIS — Z09 HOSPITAL DISCHARGE FOLLOW-UP: ICD-10-CM

## 2022-09-08 DIAGNOSIS — R93.1 ABNORMAL ECHOCARDIOGRAM: ICD-10-CM

## 2022-09-08 DIAGNOSIS — I48.0 PAROXYSMAL ATRIAL FIBRILLATION (HCC): Primary | ICD-10-CM

## 2022-09-08 PROBLEM — I42.0 DILATED CARDIOMYOPATHY (HCC): Status: ACTIVE | Noted: 2022-09-08

## 2022-09-08 PROBLEM — F31.9 BIPOLAR 1 DISORDER (HCC): Status: ACTIVE | Noted: 2022-09-08

## 2022-09-08 PROCEDURE — G8427 DOCREV CUR MEDS BY ELIG CLIN: HCPCS | Performed by: SPECIALIST

## 2022-09-08 PROCEDURE — 99205 OFFICE O/P NEW HI 60 MIN: CPT | Performed by: SPECIALIST

## 2022-09-08 PROCEDURE — 1101F PT FALLS ASSESS-DOCD LE1/YR: CPT | Performed by: SPECIALIST

## 2022-09-08 PROCEDURE — 1111F DSCHRG MED/CURRENT MED MERGE: CPT | Performed by: SPECIALIST

## 2022-09-08 PROCEDURE — G8536 NO DOC ELDER MAL SCRN: HCPCS | Performed by: SPECIALIST

## 2022-09-08 PROCEDURE — 1090F PRES/ABSN URINE INCON ASSESS: CPT | Performed by: SPECIALIST

## 2022-09-08 PROCEDURE — 1123F ACP DISCUSS/DSCN MKR DOCD: CPT | Performed by: SPECIALIST

## 2022-09-08 PROCEDURE — G8400 PT W/DXA NO RESULTS DOC: HCPCS | Performed by: SPECIALIST

## 2022-09-08 PROCEDURE — G8510 SCR DEP NEG, NO PLAN REQD: HCPCS | Performed by: SPECIALIST

## 2022-09-08 PROCEDURE — G8417 CALC BMI ABV UP PARAM F/U: HCPCS | Performed by: SPECIALIST

## 2022-09-08 PROCEDURE — 3017F COLORECTAL CA SCREEN DOC REV: CPT | Performed by: SPECIALIST

## 2022-09-08 RX ORDER — AMLODIPINE BESYLATE 5 MG/1
5 TABLET ORAL DAILY
COMMUNITY

## 2022-09-08 RX ORDER — ALBUTEROL SULFATE 90 UG/1
2 AEROSOL, METERED RESPIRATORY (INHALATION)
COMMUNITY
Start: 2022-02-26 | End: 2023-02-27

## 2022-09-08 RX ORDER — ATORVASTATIN CALCIUM 20 MG/1
20 TABLET, FILM COATED ORAL DAILY
COMMUNITY

## 2022-09-08 RX ORDER — HYDROCHLOROTHIAZIDE 12.5 MG/1
12.5 TABLET ORAL DAILY
Qty: 30 TABLET | Refills: 5 | Status: SHIPPED | OUTPATIENT
Start: 2022-09-08

## 2022-09-08 RX ORDER — FAMOTIDINE 20 MG/1
20 TABLET, FILM COATED ORAL DAILY
COMMUNITY

## 2022-09-08 RX ORDER — AMIODARONE HYDROCHLORIDE 400 MG/1
TABLET ORAL
COMMUNITY
Start: 2022-08-16

## 2022-09-08 NOTE — PROGRESS NOTES
HISTORY OF PRESENT ILLNESS  Staci Levy is a 81 y.o. female. She is seen for initial visit regarding a recent hospitalization in [place] for atrial fibrillation. She is somewhat of a difficult historian due to bipolar illness. However I have records from her hospitalization for review in [place] as well as from a follow-up visit in [facility] and these were reviewed today. She also used to drink a case of beer a day quit this about a month ago after the atrial fibrillation. She has substituted Pepsi and is drinking about 12 Pepsi's per day and had 3 this morning prior to her visit here. She was in [place] last month and had been feeling dizzy for several months. Her son was there and he had an apple watch and he put it on her and noted her to be in atrial fibrillation. She went to the hospital and was admitted. Cording to her they try for several days to medically convert her but eventually did a transesophageal echocardiogram and direct-current cardioversion to convert her to sinus rhythm. She was started on amiodarone continues to take 400 mg a day. She has treated hypertension. Since stopping on the case of beer per day she has lost 20 pounds. She does not smoke cigarettes. Her echocardiogram done in [place] and reviewed by myself showed mildly reduced ejection fraction in the range of 45 to percent. She did have significant swelling which has improved in her legs. She does have a persistent cough. 60 minutes were spent on this visit reviewing records as well as taking a history and examined the patient. [facility]  Patient Active Problem List   Diagnosis Code    Hypoxia R09.02    PAF (paroxysmal atrial fibrillation) ([facility]) I48.0    Primary hypertension I10    Dilated cardiomyopathy ([facility]) I42.0    Bipolar 1 disorder ([facility]) F31.9     Current Outpatient Medications   Medication Sig Dispense Refill    apixaban (ELIQUIS) 5 mg tablet Take 5 mg by mouth two (2) times a day. atorvastatin (LIPITOR) 20 mg tablet Take 20 mg by mouth daily. amLODIPine (NORVASC) 5 mg tablet Take 5 mg by mouth daily. amiodarone (PACERONE) 400 mg tablet       albuterol (PROVENTIL HFA, VENTOLIN HFA, PROAIR HFA) 90 mcg/actuation inhaler Take 2 Puffs by inhalation every six (6) hours as needed. famotidine (PEPCID) 20 mg tablet Take 20 mg by mouth daily. hydroCHLOROthiazide (HYDRODIURIL) 12.5 mg tablet Take 1 Tablet by mouth daily. 30 Tablet 5    metFORMIN (GLUCOPHAGE) 500 mg tablet Take 500 mg by mouth daily (with breakfast). OXcarbazepine (TRILEPTAL) 300 mg tablet Take 300 mg by mouth two (2) times a day. venlafaxine-SR (EFFEXOR-XR) 75 mg capsule Take 75 mg by mouth daily. levothyroxine (SYNTHROID) 75 mcg tablet Take 75 mcg by mouth Daily (before breakfast). citalopram (CELEXA) 20 mg tablet Take 20 mg by mouth daily. valsartan (DIOVAN) 80 mg tablet Take 80 mg by mouth daily. oxybutynin chloride XL (DITROPAN XL) 5 mg CR tablet Take 5 mg by mouth daily. methocarbamoL (ROBAXIN) 500 mg tablet Take  by mouth three (3) times daily. (Patient not taking: Reported on 9/8/2022)      ARIPiprazole (ABILIFY) 10 mg tablet Take 10 mg by mouth daily. (Patient not taking: Reported on 9/8/2022)       Past Medical History:   Diagnosis Date    Asthma     Atrial fibrillation ([facility]     Bipolar 1 disorder ([facility]     Essential hypertension     Pneumonia      Past Surgical History:   Procedure Laterality Date    HX ORTHOPAEDIC         Review of Systems   Constitutional:  Positive for weight loss. Respiratory:  Positive for cough. Neurological:  Positive for dizziness. All other systems reviewed and are negative. Visit Vitals  BP (!) 146/70 (BP 1 Location: Right upper arm, BP Patient Position: Sitting, BP Cuff Size: Large adult)   Pulse 74   Resp 18   Ht 5' 8\" (1.727 m)   Wt 248 lb (112.5 kg)   SpO2 99%   BMI 37.71 kg/m²       Physical Exam  Vitals and nursing note reviewed. Constitutional:       Appearance: She is obese. HENT:      Head: Normocephalic. Right Ear: External ear normal.      Left Ear: External ear normal.      Nose: Nose normal.      Mouth/Throat:      Mouth: Mucous membranes are moist.   Eyes:      Extraocular Movements: Extraocular movements intact. Cardiovascular:      Rate and Rhythm: Normal rate and regular rhythm. Heart sounds: Normal heart sounds. Pulmonary:      Breath sounds: Normal breath sounds. Abdominal:      Palpations: Abdomen is soft. Musculoskeletal:         General: Swelling present. Cervical back: Normal range of motion. Right lower leg: No edema. Left lower leg: No edema. Skin:     General: Skin is warm. Neurological:      General: No focal deficit present. Mental Status: She is alert. Psychiatric:         Behavior: Behavior normal.       ASSESSMENT and PLAN  She remains in sinus rhythm to exam.  She has mild swelling in her feet but no significant edema. She is hypertensive in the office today despite therapy. I suspect that her reduced left ventricular function was due to unknown tachycardia due to the ablation and should improve with therapy and time. I have advised her to try to cut back on the 12 Pepsi's per day. I will start her on hydrochlorothiazide 12.5 mg to take once a day for better blood pressure control and for her mild swelling. She will continue her current therapy with amlodipine 5 mg and valsartan 80 mg. I will reduce amiodarone from 400 down to 200 mg a day. I will have her return in approximately a month for repeat echocardiogram in the office to follow-up.

## 2022-10-13 ENCOUNTER — ANCILLARY PROCEDURE (OUTPATIENT)
Dept: CARDIOLOGY CLINIC | Age: 72
End: 2022-10-13
Payer: MEDICARE

## 2022-10-13 ENCOUNTER — OFFICE VISIT (OUTPATIENT)
Dept: CARDIOLOGY CLINIC | Age: 72
End: 2022-10-13

## 2022-10-13 VITALS — WEIGHT: 243 LBS | HEIGHT: 68 IN | BODY MASS INDEX: 36.83 KG/M2

## 2022-10-13 VITALS
OXYGEN SATURATION: 99 % | DIASTOLIC BLOOD PRESSURE: 90 MMHG | HEIGHT: 68 IN | SYSTOLIC BLOOD PRESSURE: 135 MMHG | RESPIRATION RATE: 18 BRPM | WEIGHT: 236 LBS | BODY MASS INDEX: 35.77 KG/M2 | HEART RATE: 64 BPM

## 2022-10-13 DIAGNOSIS — I42.0 DILATED CARDIOMYOPATHY (HCC): ICD-10-CM

## 2022-10-13 DIAGNOSIS — R93.1 ABNORMAL ECHOCARDIOGRAM: ICD-10-CM

## 2022-10-13 DIAGNOSIS — F31.9 BIPOLAR 1 DISORDER (HCC): ICD-10-CM

## 2022-10-13 DIAGNOSIS — I48.0 PAROXYSMAL ATRIAL FIBRILLATION (HCC): ICD-10-CM

## 2022-10-13 DIAGNOSIS — I48.0 PAROXYSMAL ATRIAL FIBRILLATION (HCC): Primary | ICD-10-CM

## 2022-10-13 DIAGNOSIS — I42.9 CARDIOMYOPATHY, UNSPECIFIED TYPE (HCC): ICD-10-CM

## 2022-10-13 DIAGNOSIS — Z09 HOSPITAL DISCHARGE FOLLOW-UP: ICD-10-CM

## 2022-10-13 DIAGNOSIS — I10 PRIMARY HYPERTENSION: ICD-10-CM

## 2022-10-13 LAB
ECHO AO ASC DIAM: 3.3 CM
ECHO AO ASCENDING AORTA INDEX: 1.49 CM/M2
ECHO AO ROOT DIAM: 3.1 CM
ECHO AO ROOT INDEX: 1.4 CM/M2
ECHO AV AREA PEAK VELOCITY: 2.3 CM2
ECHO AV AREA VTI: 2.3 CM2
ECHO AV AREA/BSA PEAK VELOCITY: 1 CM2/M2
ECHO AV AREA/BSA VTI: 1 CM2/M2
ECHO AV MEAN GRADIENT: 4 MMHG
ECHO AV MEAN VELOCITY: 1 M/S
ECHO AV PEAK GRADIENT: 7 MMHG
ECHO AV PEAK VELOCITY: 1.4 M/S
ECHO AV VELOCITY RATIO: 0.57
ECHO AV VTI: 32.8 CM
ECHO EST RA PRESSURE: 8 MMHG
ECHO LA DIAMETER INDEX: 1.76 CM/M2
ECHO LA DIAMETER: 3.9 CM
ECHO LA TO AORTIC ROOT RATIO: 1.26
ECHO LA VOL 2C: 90 ML (ref 22–52)
ECHO LA VOL 4C: 95 ML (ref 22–52)
ECHO LA VOL BP: 98 ML (ref 22–52)
ECHO LA VOL/BSA BIPLANE: 44 ML/M2 (ref 16–34)
ECHO LA VOLUME AREA LENGTH: 107 ML
ECHO LA VOLUME INDEX A2C: 41 ML/M2 (ref 16–34)
ECHO LA VOLUME INDEX A4C: 43 ML/M2 (ref 16–34)
ECHO LA VOLUME INDEX AREA LENGTH: 48 ML/M2 (ref 16–34)
ECHO LV E' LATERAL VELOCITY: 7 CM/S
ECHO LV E' SEPTAL VELOCITY: 8 CM/S
ECHO LV EDV A2C: 125 ML
ECHO LV EDV A4C: 115 ML
ECHO LV EDV BP: 122 ML (ref 56–104)
ECHO LV EDV INDEX A4C: 52 ML/M2
ECHO LV EDV INDEX BP: 55 ML/M2
ECHO LV EDV NDEX A2C: 56 ML/M2
ECHO LV EJECTION FRACTION A2C: 63 %
ECHO LV EJECTION FRACTION A4C: 59 %
ECHO LV EJECTION FRACTION BIPLANE: 61 % (ref 55–100)
ECHO LV ESV A2C: 47 ML
ECHO LV ESV A4C: 47 ML
ECHO LV ESV BP: 48 ML (ref 19–49)
ECHO LV ESV INDEX A2C: 21 ML/M2
ECHO LV ESV INDEX A4C: 21 ML/M2
ECHO LV ESV INDEX BP: 22 ML/M2
ECHO LV FRACTIONAL SHORTENING: 29 % (ref 28–44)
ECHO LV INTERNAL DIMENSION DIASTOLE INDEX: 2.16 CM/M2
ECHO LV INTERNAL DIMENSION DIASTOLIC: 4.8 CM (ref 3.9–5.3)
ECHO LV INTERNAL DIMENSION SYSTOLIC INDEX: 1.53 CM/M2
ECHO LV INTERNAL DIMENSION SYSTOLIC: 3.4 CM
ECHO LV IVSD: 1.5 CM (ref 0.6–0.9)
ECHO LV MASS 2D: 273.8 G (ref 67–162)
ECHO LV MASS INDEX 2D: 123.3 G/M2 (ref 43–95)
ECHO LV POSTERIOR WALL DIASTOLIC: 1.3 CM (ref 0.6–0.9)
ECHO LV RELATIVE WALL THICKNESS RATIO: 0.54
ECHO LVOT AREA: 3.8 CM2
ECHO LVOT AV VTI INDEX: 0.59
ECHO LVOT DIAM: 2.2 CM
ECHO LVOT MEAN GRADIENT: 2 MMHG
ECHO LVOT PEAK GRADIENT: 3 MMHG
ECHO LVOT PEAK VELOCITY: 0.8 M/S
ECHO LVOT STROKE VOLUME INDEX: 33.4 ML/M2
ECHO LVOT SV: 74.1 ML
ECHO LVOT VTI: 19.5 CM
ECHO MV A VELOCITY: 1.02 M/S
ECHO MV E DECELERATION TIME (DT): 290.3 MS
ECHO MV E VELOCITY: 1.11 M/S
ECHO MV E/A RATIO: 1.09
ECHO MV E/E' LATERAL: 15.86
ECHO MV E/E' RATIO (AVERAGED): 14.87
ECHO MV E/E' SEPTAL: 13.88
ECHO PULMONARY ARTERY END DIASTOLIC PRESSURE: 4 MMHG
ECHO PV MAX VELOCITY: 0.9 M/S
ECHO PV PEAK GRADIENT: 3 MMHG
ECHO PV REGURGITANT MAX VELOCITY: 1 M/S
ECHO RIGHT VENTRICULAR SYSTOLIC PRESSURE (RVSP): 41 MMHG
ECHO RV TAPSE: 2.4 CM (ref 1.7–?)
ECHO TV REGURGITANT MAX VELOCITY: 2.87 M/S
ECHO TV REGURGITANT PEAK GRADIENT: 33 MMHG

## 2022-10-13 PROCEDURE — G8536 NO DOC ELDER MAL SCRN: HCPCS | Performed by: SPECIALIST

## 2022-10-13 PROCEDURE — 1090F PRES/ABSN URINE INCON ASSESS: CPT | Performed by: SPECIALIST

## 2022-10-13 PROCEDURE — 93306 TTE W/DOPPLER COMPLETE: CPT | Performed by: SPECIALIST

## 2022-10-13 PROCEDURE — G8400 PT W/DXA NO RESULTS DOC: HCPCS | Performed by: SPECIALIST

## 2022-10-13 PROCEDURE — 1123F ACP DISCUSS/DSCN MKR DOCD: CPT | Performed by: SPECIALIST

## 2022-10-13 PROCEDURE — G8427 DOCREV CUR MEDS BY ELIG CLIN: HCPCS | Performed by: SPECIALIST

## 2022-10-13 PROCEDURE — 1101F PT FALLS ASSESS-DOCD LE1/YR: CPT | Performed by: SPECIALIST

## 2022-10-13 PROCEDURE — G9717 DOC PT DX DEP/BP F/U NT REQ: HCPCS | Performed by: SPECIALIST

## 2022-10-13 PROCEDURE — 99214 OFFICE O/P EST MOD 30 MIN: CPT | Performed by: SPECIALIST

## 2022-10-13 PROCEDURE — 3017F COLORECTAL CA SCREEN DOC REV: CPT | Performed by: SPECIALIST

## 2022-10-13 PROCEDURE — G8417 CALC BMI ABV UP PARAM F/U: HCPCS | Performed by: SPECIALIST

## 2022-10-13 NOTE — PROGRESS NOTES
Identified pt with two pt identifiers(name and ). Reviewed record in preparation for visit and have obtained necessary documentation. All patient medications has been reviewed. Chief Complaint   Patient presents with    Cardiomyopathy     Patient stated she gets dizzy sometimes        3 most recent PHQ Screens 10/13/2022   Little interest or pleasure in doing things Not at all   Feeling down, depressed, irritable, or hopeless Not at all   Total Score PHQ 2 0     No flowsheet data found. Wt Readings from Last 3 Encounters:   10/13/22 236 lb (107 kg)   10/13/22 243 lb (110.2 kg)   22 248 lb (112.5 kg)     Temp Readings from Last 3 Encounters:   20 97.9 °F (36.6 °C)     BP Readings from Last 3 Encounters:   10/13/22 (!) 135/90   22 (!) 146/70   20 135/53     Pulse Readings from Last 3 Encounters:   10/13/22 64   22 74   20 71       1. \"Have you been to the ER, urgent care clinic since your last visit? Hospitalized since your last visit? \" No    2. \"Have you seen or consulted any other health care providers outside of the [address] [place] [address] since your last visit? \" No

## 2022-10-13 NOTE — PROGRESS NOTES
HISTORY OF PRESENT ILLNESS  Roc Garcia is a 81 y.o. female. She has hypertension and apparently had paroxysmal atrial fibrillation while visiting in [place]. She was cardioverted and placed on Eliquis and amiodarone. She was drinking a case of beer a day and has stopped this. She still drinks about 12 Pepsi's per day but has lost 12 pounds and more altogether. She complains of some dizziness. An echo today shows normal heart function. She has bipolar disorder. HPI  Patient Active Problem List   Diagnosis Code    Hypoxia R09.02    PAF (paroxysmal atrial fibrillation) ([facility]) I48.0    Primary hypertension I10    Dilated cardiomyopathy ([facility]) I42.0    Bipolar 1 disorder ([facility]) F31.9     Current Outpatient Medications   Medication Sig Dispense Refill    apixaban (ELIQUIS) 5 mg tablet Take 5 mg by mouth two (2) times a day. atorvastatin (LIPITOR) 20 mg tablet Take 20 mg by mouth daily. amLODIPine (NORVASC) 5 mg tablet Take 5 mg by mouth daily. amiodarone (PACERONE) 400 mg tablet       albuterol (PROVENTIL HFA, VENTOLIN HFA, PROAIR HFA) 90 mcg/actuation inhaler Take 2 Puffs by inhalation every six (6) hours as needed. hydroCHLOROthiazide (HYDRODIURIL) 12.5 mg tablet Take 1 Tablet by mouth daily. 30 Tablet 5    metFORMIN (GLUCOPHAGE) 500 mg tablet Take 500 mg by mouth daily (with breakfast). OXcarbazepine (TRILEPTAL) 300 mg tablet Take 300 mg by mouth two (2) times a day. venlafaxine-SR (EFFEXOR-XR) 75 mg capsule Take 75 mg by mouth daily. levothyroxine (SYNTHROID) 75 mcg tablet Take 75 mcg by mouth Daily (before breakfast). citalopram (CELEXA) 20 mg tablet Take 20 mg by mouth daily. valsartan (DIOVAN) 80 mg tablet Take 80 mg by mouth daily. oxybutynin chloride XL (DITROPAN XL) 5 mg CR tablet Take 5 mg by mouth daily. famotidine (PEPCID) 20 mg tablet Take 20 mg by mouth daily.  (Patient not taking: Reported on 10/13/2022)      methocarbamoL (ROBAXIN) 500 mg tablet Take  by mouth three (3) times daily. (Patient not taking: No sig reported)      ARIPiprazole (ABILIFY) 10 mg tablet Take 10 mg by mouth daily. (Patient not taking: Reported on 9/8/2022)       Past Medical History:   Diagnosis Date    Asthma     Atrial fibrillation ([facility] [address]     Bipolar 1 disorder ([facility]     Essential hypertension     Pneumonia      Past Surgical History:   Procedure Laterality Date    HX ORTHOPAEDIC         Review of Systems   Constitutional:  Positive for weight loss. Neurological:  Positive for dizziness. All other systems reviewed and are negative. Visit Vitals  BP (!) 135/90 (BP 1 Location: Left upper arm, BP Patient Position: Sitting, BP Cuff Size: Large adult)   Pulse 64   Resp 18   Ht 5' 8\" (1.727 m)   Wt 236 lb (107 kg)   SpO2 99%   BMI 35.88 kg/m²       Physical Exam  Vitals and nursing note reviewed. HENT:      Head: Normocephalic. Right Ear: External ear normal.      Left Ear: External ear normal.      Nose: Nose normal.      Mouth/Throat:      Mouth: Mucous membranes are moist.   Eyes:      Extraocular Movements: Extraocular movements intact. Cardiovascular:      Rate and Rhythm: Normal rate and regular rhythm. Heart sounds: Normal heart sounds. Pulmonary:      Breath sounds: Normal breath sounds. Abdominal:      Palpations: Abdomen is soft. Musculoskeletal:         General: Normal range of motion. Cervical back: Normal range of motion. Right lower leg: No edema. Left lower leg: No edema. Skin:     General: Skin is warm. Neurological:      Mental Status: She is alert. Mental status is at baseline. Psychiatric:         Behavior: Behavior normal.       ASSESSMENT and PLAN  She is doing better overall and her blood pressure is better since adding hydrochlorothiazide and her weight loss. It is unclear why she has the dizziness. She was supposed to reduce the dose of amiodarone but did not do so.   At this point I will have her stop the amiodarone completely and I will plan to see her back in 3 months time.

## 2023-01-26 ENCOUNTER — OFFICE VISIT (OUTPATIENT)
Dept: CARDIOLOGY CLINIC | Age: 73
End: 2023-01-26
Payer: MEDICARE

## 2023-01-26 VITALS
HEIGHT: 68 IN | OXYGEN SATURATION: 98 % | DIASTOLIC BLOOD PRESSURE: 70 MMHG | HEART RATE: 78 BPM | WEIGHT: 232 LBS | RESPIRATION RATE: 16 BRPM | BODY MASS INDEX: 35.16 KG/M2 | SYSTOLIC BLOOD PRESSURE: 130 MMHG

## 2023-01-26 DIAGNOSIS — R93.1 ABNORMAL ECHOCARDIOGRAM: ICD-10-CM

## 2023-01-26 DIAGNOSIS — I48.0 PAROXYSMAL ATRIAL FIBRILLATION (HCC): ICD-10-CM

## 2023-01-26 DIAGNOSIS — I10 PRIMARY HYPERTENSION: Primary | ICD-10-CM

## 2023-01-26 DIAGNOSIS — I42.9 CARDIOMYOPATHY, UNSPECIFIED TYPE (HCC): ICD-10-CM

## 2023-01-26 DIAGNOSIS — F31.9 BIPOLAR 1 DISORDER (HCC): ICD-10-CM

## 2023-01-26 PROCEDURE — 3078F DIAST BP <80 MM HG: CPT | Performed by: SPECIALIST

## 2023-01-26 PROCEDURE — 3075F SYST BP GE 130 - 139MM HG: CPT | Performed by: SPECIALIST

## 2023-01-26 PROCEDURE — 99214 OFFICE O/P EST MOD 30 MIN: CPT | Performed by: SPECIALIST

## 2023-01-26 PROCEDURE — G8417 CALC BMI ABV UP PARAM F/U: HCPCS | Performed by: SPECIALIST

## 2023-01-26 PROCEDURE — 1090F PRES/ABSN URINE INCON ASSESS: CPT | Performed by: SPECIALIST

## 2023-01-26 PROCEDURE — 3017F COLORECTAL CA SCREEN DOC REV: CPT | Performed by: SPECIALIST

## 2023-01-26 PROCEDURE — G9717 DOC PT DX DEP/BP F/U NT REQ: HCPCS | Performed by: SPECIALIST

## 2023-01-26 PROCEDURE — G8536 NO DOC ELDER MAL SCRN: HCPCS | Performed by: SPECIALIST

## 2023-01-26 PROCEDURE — 1123F ACP DISCUSS/DSCN MKR DOCD: CPT | Performed by: SPECIALIST

## 2023-01-26 PROCEDURE — G8400 PT W/DXA NO RESULTS DOC: HCPCS | Performed by: SPECIALIST

## 2023-01-26 PROCEDURE — 1101F PT FALLS ASSESS-DOCD LE1/YR: CPT | Performed by: SPECIALIST

## 2023-01-26 PROCEDURE — G8427 DOCREV CUR MEDS BY ELIG CLIN: HCPCS | Performed by: SPECIALIST

## 2023-01-26 RX ORDER — HYDROCHLOROTHIAZIDE 12.5 MG/1
12.5 TABLET ORAL DAILY
Qty: 90 TABLET | Refills: 3 | Status: SHIPPED | OUTPATIENT
Start: 2023-01-26

## 2023-01-26 NOTE — PROGRESS NOTES
HISTORY OF PRESENT ILLNESS  Indira Vela is a 81 y.o. female. She has a history of atrial fibrillation occurring in [place] in the setting of drinking a case of beer a day. She was on amiodarone but it was making her dizzy and the dizziness is now gone since stopping the medication. She remains in sinus rhythm on anticoagulation. Her weight is down 4 pounds. I thought she had stopped drinking Pepsi's but she is still drinking 5 to 6/day. Current Outpatient Medications   Medication Sig Dispense Refill    hydroCHLOROthiazide (HYDRODIURIL) 12.5 mg tablet Take 1 Tablet by mouth daily. 90 Tablet 3    apixaban (ELIQUIS) 5 mg tablet Take 5 mg by mouth two (2) times a day. atorvastatin (LIPITOR) 20 mg tablet Take 20 mg by mouth daily. amLODIPine (NORVASC) 5 mg tablet Take 5 mg by mouth daily. albuterol (PROVENTIL HFA, VENTOLIN HFA, PROAIR HFA) 90 mcg/actuation inhaler Take 2 Puffs by inhalation every six (6) hours as needed. metFORMIN (GLUCOPHAGE) 500 mg tablet Take 500 mg by mouth daily (with breakfast). OXcarbazepine (TRILEPTAL) 300 mg tablet Take 300 mg by mouth two (2) times a day. venlafaxine-SR (EFFEXOR-XR) 75 mg capsule Take 75 mg by mouth daily. levothyroxine (SYNTHROID) 75 mcg tablet Take 75 mcg by mouth Daily (before breakfast). citalopram (CELEXA) 20 mg tablet Take 20 mg by mouth daily. valsartan (DIOVAN) 80 mg tablet Take 80 mg by mouth daily. oxybutynin chloride XL (DITROPAN XL) 5 mg CR tablet Take 5 mg by mouth daily. famotidine (PEPCID) 20 mg tablet Take 20 mg by mouth daily. (Patient not taking: No sig reported)      methocarbamoL (ROBAXIN) 500 mg tablet Take  by mouth three (3) times daily. (Patient not taking: No sig reported)      ARIPiprazole (ABILIFY) 10 mg tablet Take 10 mg by mouth daily.  (Patient not taking: No sig reported)       Past Medical History:   Diagnosis Date    Asthma     Atrial fibrillation ([facility]     Bipolar 1 disorder ([facility] Essential hypertension     Pneumonia      Past Surgical History:   Procedure Laterality Date    HX ORTHOPAEDIC         Review of Systems   Constitutional:  Positive for weight loss. All other systems reviewed and are negative. Visit Vitals  /70   Pulse 78   Resp 16   Ht 5' 8\" (1.727 m)   Wt 232 lb (105.2 kg)   SpO2 98%   BMI 35.28 kg/m²       Physical Exam  Vitals and nursing note reviewed. HENT:      Head: Normocephalic. Right Ear: External ear normal.      Left Ear: External ear normal.      Nose: Nose normal.      Mouth/Throat:      Mouth: Mucous membranes are moist.   Eyes:      Extraocular Movements: Extraocular movements intact. Cardiovascular:      Rate and Rhythm: Normal rate and regular rhythm. Heart sounds: Normal heart sounds. Pulmonary:      Breath sounds: Normal breath sounds. Abdominal:      Palpations: Abdomen is soft. Musculoskeletal:         General: Normal range of motion. Cervical back: Normal range of motion. Skin:     General: Skin is warm. Neurological:      Mental Status: She is alert. Mental status is at baseline. Psychiatric:         Behavior: Behavior normal.       ASSESSMENT and PLAN  She remains in sinus rhythm and her blood pressure is better with the addition of low-dose hydrochlorothiazide. She needs to stop drinking Pepsi's including diet Pepsi's and I discussed this with her. She needs to continue to lose weight. I will continue her regimen and plan to see her back in 6 months.

## 2023-02-10 ENCOUNTER — TRANSCRIBE ORDER (OUTPATIENT)
Dept: REGISTRATION | Age: 73
End: 2023-02-10

## 2023-02-10 ENCOUNTER — HOSPITAL ENCOUNTER (OUTPATIENT)
Dept: GENERAL RADIOLOGY | Age: 73
Discharge: HOME | End: 2023-02-10
Payer: MEDICARE

## 2023-02-10 ENCOUNTER — TELEPHONE (OUTPATIENT)
Dept: CARDIOLOGY CLINIC | Age: 73
End: 2023-02-10

## 2023-02-10 DIAGNOSIS — Z01.818 OTHER SPECIFIED PRE-OPERATIVE EXAMINATION: Primary | ICD-10-CM

## 2023-02-10 DIAGNOSIS — Z01.818 OTHER SPECIFIED PRE-OPERATIVE EXAMINATION: ICD-10-CM

## 2023-02-10 PROCEDURE — 71046 X-RAY EXAM CHEST 2 VIEWS: CPT

## 2023-02-10 NOTE — TELEPHONE ENCOUNTER
Pt called and stated she has to have surgery Thursday 2/16/23 and need to have an EKG before hand.  Please Advise    Pt [phone]

## 2023-02-10 NOTE — TELEPHONE ENCOUNTER
Called patient. Verified patient's identity with two identifiers. Patient stated she is having minor foot surgery with Dr. Eleanor Guajardo (phone # [phone], fax #  [phone]). She said she either needs ekg or clearance letter stating she is okay for surgery from cardiac standpoint. I told her since she was just seen by Dr. Amee Marion on January 26th 2023 he will likely be okay with writing letter, which likely will include holding eliquis two days prior. Explained Dr. Amee Marion is out of office, but said I will call her back Monday if he is okay clearing her without ekg. Patient verbalized understanding and denied further questions or concerns.

## 2023-02-13 NOTE — TELEPHONE ENCOUNTER
Called patient. Verified patient's identity with two identifiers. Notified her Dr. Avery Gay is out of office, but Dr. Timothy Reinoso reviewed and advised she is okay to proceed with foot surgery and can hold eliquis 2 days prior then resume day after procedure. Patient verbalized understanding and denied further questions or concerns.

## 2023-03-22 DIAGNOSIS — I10 PRIMARY HYPERTENSION: Primary | ICD-10-CM

## 2023-03-22 RX ORDER — VALSARTAN 80 MG/1
80 TABLET ORAL DAILY
Qty: 90 TABLET | Refills: 1 | Status: SHIPPED | OUTPATIENT
Start: 2023-03-22

## 2023-03-22 NOTE — TELEPHONE ENCOUNTER
Requested Prescriptions     Signed Prescriptions Disp Refills    valsartan (DIOVAN) 80 mg tablet 90 Tablet 1     Sig: Take 1 Tablet by mouth daily.      Authorizing Provider: Perry Quintero     Ordering User: Baylee Eaton    Per Dr. Brant Adhikari verbal order

## 2023-07-27 ENCOUNTER — OFFICE VISIT (OUTPATIENT)
Age: 73
End: 2023-07-27
Payer: MEDICARE

## 2023-07-27 VITALS
BODY MASS INDEX: 32.28 KG/M2 | SYSTOLIC BLOOD PRESSURE: 140 MMHG | DIASTOLIC BLOOD PRESSURE: 80 MMHG | HEIGHT: 68 IN | HEART RATE: 64 BPM | WEIGHT: 213 LBS | OXYGEN SATURATION: 98 %

## 2023-07-27 DIAGNOSIS — I48.0 PAROXYSMAL ATRIAL FIBRILLATION (HCC): ICD-10-CM

## 2023-07-27 DIAGNOSIS — I10 ESSENTIAL (PRIMARY) HYPERTENSION: Primary | ICD-10-CM

## 2023-07-27 DIAGNOSIS — I42.0 DILATED CARDIOMYOPATHY (HCC): ICD-10-CM

## 2023-07-27 PROCEDURE — 99214 OFFICE O/P EST MOD 30 MIN: CPT | Performed by: SPECIALIST

## 2023-07-27 PROCEDURE — 1123F ACP DISCUSS/DSCN MKR DOCD: CPT | Performed by: SPECIALIST

## 2023-07-27 PROCEDURE — G8400 PT W/DXA NO RESULTS DOC: HCPCS | Performed by: SPECIALIST

## 2023-07-27 PROCEDURE — G8417 CALC BMI ABV UP PARAM F/U: HCPCS | Performed by: SPECIALIST

## 2023-07-27 PROCEDURE — 3077F SYST BP >= 140 MM HG: CPT | Performed by: SPECIALIST

## 2023-07-27 PROCEDURE — 1036F TOBACCO NON-USER: CPT | Performed by: SPECIALIST

## 2023-07-27 PROCEDURE — 3017F COLORECTAL CA SCREEN DOC REV: CPT | Performed by: SPECIALIST

## 2023-07-27 PROCEDURE — G8427 DOCREV CUR MEDS BY ELIG CLIN: HCPCS | Performed by: SPECIALIST

## 2023-07-27 PROCEDURE — 1090F PRES/ABSN URINE INCON ASSESS: CPT | Performed by: SPECIALIST

## 2023-07-27 PROCEDURE — 3079F DIAST BP 80-89 MM HG: CPT | Performed by: SPECIALIST

## 2023-07-27 ASSESSMENT — PATIENT HEALTH QUESTIONNAIRE - PHQ9
SUM OF ALL RESPONSES TO PHQ QUESTIONS 1-9: 0
1. LITTLE INTEREST OR PLEASURE IN DOING THINGS: 0
5. POOR APPETITE OR OVEREATING: 0
2. FEELING DOWN, DEPRESSED OR HOPELESS: 0
3. TROUBLE FALLING OR STAYING ASLEEP: 0
SUM OF ALL RESPONSES TO PHQ QUESTIONS 1-9: 0
9. THOUGHTS THAT YOU WOULD BE BETTER OFF DEAD, OR OF HURTING YOURSELF: 0
10. IF YOU CHECKED OFF ANY PROBLEMS, HOW DIFFICULT HAVE THESE PROBLEMS MADE IT FOR YOU TO DO YOUR WORK, TAKE CARE OF THINGS AT HOME, OR GET ALONG WITH OTHER PEOPLE: 0
SUM OF ALL RESPONSES TO PHQ9 QUESTIONS 1 & 2: 0
SUM OF ALL RESPONSES TO PHQ QUESTIONS 1-9: 0
6. FEELING BAD ABOUT YOURSELF - OR THAT YOU ARE A FAILURE OR HAVE LET YOURSELF OR YOUR FAMILY DOWN: 0
7. TROUBLE CONCENTRATING ON THINGS, SUCH AS READING THE NEWSPAPER OR WATCHING TELEVISION: 0
8. MOVING OR SPEAKING SO SLOWLY THAT OTHER PEOPLE COULD HAVE NOTICED. OR THE OPPOSITE, BEING SO FIGETY OR RESTLESS THAT YOU HAVE BEEN MOVING AROUND A LOT MORE THAN USUAL: 0
4. FEELING TIRED OR HAVING LITTLE ENERGY: 0
SUM OF ALL RESPONSES TO PHQ QUESTIONS 1-9: 0

## 2023-07-27 NOTE — PROGRESS NOTES
HISTORY OF PRESENT ILLNESS  Yasmine Calle is a 72 y.o. female   She has a history of atrial fibrillation and cardiomyopathy. This occurred in [place] when she was drinking a case of beer a day. She was also drinking quite a few Pepsi's. Since being here she has remained in sinus rhythm and has lost 19 pounds since I last saw her. She no longer drinks Pepsi's and does not drink alcohol. Her left ventricular function has returned to normal by echocardiography. She sees her primary care physician next week. She continues to take Eliquis but it is quite expensive for her. She has treated hypertension and diabetes. She also has bipolar disorder.   HPI     Patient Active Problem List   Diagnosis    Hypoxia    PAF (paroxysmal atrial fibrillation) ([facility])    Primary hypertension    Dilated cardiomyopathy ([facility])    Bipolar 1 disorder ([facility])     Current Outpatient Medications   Medication Sig Dispense Refill    amLODIPine (NORVASC) 5 MG tablet Take 1 tablet by mouth daily      apixaban (ELIQUIS) 5 MG TABS tablet Take 1 tablet by mouth 2 times daily      ARIPiprazole (ABILIFY) 10 MG tablet Take 1 tablet by mouth daily      atorvastatin (LIPITOR) 20 MG tablet Take 1 tablet by mouth daily      citalopram (CELEXA) 20 MG tablet Take 1 tablet by mouth daily      famotidine (PEPCID) 20 MG tablet Take 1 tablet by mouth daily      hydroCHLOROthiazide (HYDRODIURIL) 12.5 MG tablet Take 1 tablet by mouth daily      levothyroxine (SYNTHROID) 75 MCG tablet Take 1 tablet by mouth every morning (before breakfast)      metFORMIN (GLUCOPHAGE) 500 MG tablet Take 1 tablet by mouth 2 times daily (with meals)      OXcarbazepine (TRILEPTAL) 300 MG tablet Take 1 tablet by mouth 2 times daily      oxybutynin (DITROPAN-XL) 5 MG extended release tablet Take 1 tablet by mouth daily      valsartan (DIOVAN) 80 MG tablet Take 1 tablet by mouth daily      venlafaxine (EFFEXOR XR) 75 MG extended release capsule Take 1 capsule by mouth daily       No

## 2023-10-07 DIAGNOSIS — I10 ESSENTIAL (PRIMARY) HYPERTENSION: Primary | ICD-10-CM

## 2023-10-09 RX ORDER — VALSARTAN 80 MG/1
80 TABLET ORAL DAILY
Qty: 90 TABLET | Refills: 1 | Status: SHIPPED | OUTPATIENT
Start: 2023-10-09

## 2023-10-09 NOTE — TELEPHONE ENCOUNTER
Requested Prescriptions     Signed Prescriptions Disp Refills    valsartan (DIOVAN) 80 MG tablet 90 tablet 1     Sig: TAKE 1 TABLET EVERY DAY     Authorizing Provider: Randolph Nunez     Ordering User: Carol Ann Sullivan    Per Dr. Alfonzo Marcelino verbal order.

## 2024-01-22 ENCOUNTER — HOSPITAL ENCOUNTER (OUTPATIENT)
Facility: HOSPITAL | Age: 74
Discharge: HOME | End: 2024-01-25
Payer: MEDICARE

## 2024-01-22 DIAGNOSIS — M54.31 BILATERAL SCIATICA: ICD-10-CM

## 2024-01-22 DIAGNOSIS — M51.36 DISCOGENIC LOW BACK PAIN: ICD-10-CM

## 2024-01-22 DIAGNOSIS — M54.32 BILATERAL SCIATICA: ICD-10-CM

## 2024-01-22 PROCEDURE — 72148 MRI LUMBAR SPINE W/O DYE: CPT

## 2024-02-01 ENCOUNTER — OFFICE VISIT (OUTPATIENT)
Age: 74
End: 2024-02-01
Payer: MEDICARE

## 2024-02-01 VITALS
WEIGHT: 209 LBS | OXYGEN SATURATION: 97 % | HEIGHT: 68 IN | HEART RATE: 85 BPM | SYSTOLIC BLOOD PRESSURE: 140 MMHG | DIASTOLIC BLOOD PRESSURE: 62 MMHG | BODY MASS INDEX: 31.67 KG/M2

## 2024-02-01 DIAGNOSIS — E87.1 ACUTE HYPONATREMIA: ICD-10-CM

## 2024-02-01 DIAGNOSIS — I10 ESSENTIAL (PRIMARY) HYPERTENSION: ICD-10-CM

## 2024-02-01 DIAGNOSIS — I42.0 DILATED CARDIOMYOPATHY (HCC): ICD-10-CM

## 2024-02-01 DIAGNOSIS — I48.0 PAROXYSMAL ATRIAL FIBRILLATION (HCC): Primary | ICD-10-CM

## 2024-02-01 PROCEDURE — G8484 FLU IMMUNIZE NO ADMIN: HCPCS | Performed by: SPECIALIST

## 2024-02-01 PROCEDURE — G8400 PT W/DXA NO RESULTS DOC: HCPCS | Performed by: SPECIALIST

## 2024-02-01 PROCEDURE — 3078F DIAST BP <80 MM HG: CPT | Performed by: SPECIALIST

## 2024-02-01 PROCEDURE — 3017F COLORECTAL CA SCREEN DOC REV: CPT | Performed by: SPECIALIST

## 2024-02-01 PROCEDURE — 1123F ACP DISCUSS/DSCN MKR DOCD: CPT | Performed by: SPECIALIST

## 2024-02-01 PROCEDURE — G8417 CALC BMI ABV UP PARAM F/U: HCPCS | Performed by: SPECIALIST

## 2024-02-01 PROCEDURE — 3077F SYST BP >= 140 MM HG: CPT | Performed by: SPECIALIST

## 2024-02-01 PROCEDURE — G8427 DOCREV CUR MEDS BY ELIG CLIN: HCPCS | Performed by: SPECIALIST

## 2024-02-01 PROCEDURE — 99214 OFFICE O/P EST MOD 30 MIN: CPT | Performed by: SPECIALIST

## 2024-02-01 PROCEDURE — 1090F PRES/ABSN URINE INCON ASSESS: CPT | Performed by: SPECIALIST

## 2024-02-01 PROCEDURE — 1036F TOBACCO NON-USER: CPT | Performed by: SPECIALIST

## 2024-02-01 RX ORDER — VALSARTAN 80 MG/1
80 TABLET ORAL DAILY
Qty: 90 TABLET | Refills: 3 | Status: SHIPPED | OUTPATIENT
Start: 2024-02-01

## 2024-02-01 ASSESSMENT — PATIENT HEALTH QUESTIONNAIRE - PHQ9
2. FEELING DOWN, DEPRESSED OR HOPELESS: 0
SUM OF ALL RESPONSES TO PHQ QUESTIONS 1-9: 0
1. LITTLE INTEREST OR PLEASURE IN DOING THINGS: 0
SUM OF ALL RESPONSES TO PHQ9 QUESTIONS 1 & 2: 0
SUM OF ALL RESPONSES TO PHQ QUESTIONS 1-9: 0

## 2024-02-01 NOTE — PROGRESS NOTES
HISTORY OF PRESENT ILLNESS  Darshana Kraus is a 73 y.o. female   She has a history of dilated cardiomyopathy and hypertension and paroxysmal atrial fibrillation.  Her heart function has come back to normal with medical therapy.  I last saw her 6 months ago and since that time she required admission to a hospital in [place] for sodium of 116 felt to be due to her 12.5 mg of hydrochlorothiazide.  She actually had seizures.  The medication was discontinued.  Her weight is down 4 pounds.  She checks herself daily for atrial fibrillation using a device and has remained in sinus rhythm.  The apixaban is quite expensive for her.  HPI     Specialty Problems          Cardiology Problems    Dilated cardiomyopathy (HCC)        PAF (paroxysmal atrial fibrillation) (HCC)        Primary hypertension          Current Outpatient Medications   Medication Instructions    amLODIPine (NORVASC) 5 mg, Oral, DAILY    apixaban (ELIQUIS) 5 mg, Oral, 2 TIMES DAILY    ARIPiprazole (ABILIFY) 10 mg, Oral, DAILY    atorvastatin (LIPITOR) 20 mg, Oral, DAILY    citalopram (CELEXA) 20 mg, Oral, DAILY    famotidine (PEPCID) 20 mg, DAILY    levothyroxine (SYNTHROID) 75 mcg, Oral, DAILY BEFORE BREAKFAST    metFORMIN (GLUCOPHAGE) 500 mg, Oral, 2 TIMES DAILY WITH MEALS    OXcarbazepine (TRILEPTAL) 300 mg, Oral, 2 TIMES DAILY    oxyBUTYnin (DITROPAN-XL) 5 mg, Oral, DAILY    valsartan (DIOVAN) 80 mg, Oral, DAILY    venlafaxine (EFFEXOR XR) 75 mg, Oral, DAILY      Allergies   Allergen Reactions    Esomeprazole Magnesium Other (See Comments)    Hydrochlorothiazide Other (See Comments)     Severe hyponatremia causing seizures    Lisinopril Nausea And Vomiting     Past Medical History:   Diagnosis Date    Asthma     Atrial fibrillation (HCC)     Bipolar 1 disorder (HCC)     Essential hypertension     Pneumonia      Past Surgical History:   Procedure Laterality Date    ORTHOPEDIC SURGERY       History reviewed. No pertinent family history.  Social History

## 2024-03-15 ENCOUNTER — APPOINTMENT (OUTPATIENT)
Facility: HOSPITAL | Age: 74
End: 2024-03-15
Payer: MEDICARE

## 2024-03-15 ENCOUNTER — HOSPITAL ENCOUNTER (EMERGENCY)
Facility: HOSPITAL | Age: 74
Discharge: HOME | End: 2024-03-15
Attending: EMERGENCY MEDICINE
Payer: MEDICARE

## 2024-03-15 VITALS
TEMPERATURE: 97.5 F | HEART RATE: 72 BPM | DIASTOLIC BLOOD PRESSURE: 65 MMHG | SYSTOLIC BLOOD PRESSURE: 168 MMHG | OXYGEN SATURATION: 97 % | WEIGHT: 208.56 LBS | RESPIRATION RATE: 18 BRPM | HEIGHT: 68 IN | BODY MASS INDEX: 31.61 KG/M2

## 2024-03-15 DIAGNOSIS — M79.671 RIGHT FOOT PAIN: ICD-10-CM

## 2024-03-15 DIAGNOSIS — M19.071 OSTEOARTHRITIS OF RIGHT FOOT, UNSPECIFIED OSTEOARTHRITIS TYPE: Primary | ICD-10-CM

## 2024-03-15 PROCEDURE — 73630 X-RAY EXAM OF FOOT: CPT

## 2024-03-15 PROCEDURE — 6360000002 HC RX W HCPCS: Performed by: EMERGENCY MEDICINE

## 2024-03-15 PROCEDURE — 6370000000 HC RX 637 (ALT 250 FOR IP): Performed by: EMERGENCY MEDICINE

## 2024-03-15 PROCEDURE — 99284 EMERGENCY DEPT VISIT MOD MDM: CPT

## 2024-03-15 PROCEDURE — 96372 THER/PROPH/DIAG INJ SC/IM: CPT

## 2024-03-15 RX ORDER — KETOROLAC TROMETHAMINE 30 MG/ML
30 INJECTION, SOLUTION INTRAMUSCULAR; INTRAVENOUS
Status: COMPLETED | OUTPATIENT
Start: 2024-03-15 | End: 2024-03-15

## 2024-03-15 RX ORDER — DIAZEPAM 5 MG/1
5 TABLET ORAL ONCE
Status: COMPLETED | OUTPATIENT
Start: 2024-03-15 | End: 2024-03-15

## 2024-03-15 RX ORDER — OXYCODONE HYDROCHLORIDE AND ACETAMINOPHEN 5; 325 MG/1; MG/1
1 TABLET ORAL EVERY 6 HOURS PRN
Qty: 12 TABLET | Refills: 0 | Status: SHIPPED | OUTPATIENT
Start: 2024-03-15 | End: 2024-03-18

## 2024-03-15 RX ADMIN — DIAZEPAM 5 MG: 5 TABLET ORAL at 03:15

## 2024-03-15 RX ADMIN — KETOROLAC TROMETHAMINE 30 MG: 30 INJECTION, SOLUTION INTRAMUSCULAR at 03:15

## 2024-03-15 ASSESSMENT — PAIN DESCRIPTION - DESCRIPTORS
DESCRIPTORS: ACHING
DESCRIPTORS: ACHING

## 2024-03-15 ASSESSMENT — PAIN DESCRIPTION - ORIENTATION
ORIENTATION: RIGHT

## 2024-03-15 ASSESSMENT — PAIN DESCRIPTION - LOCATION
LOCATION: FOOT

## 2024-03-15 ASSESSMENT — PAIN SCALES - GENERAL
PAINLEVEL_OUTOF10: 8
PAINLEVEL_OUTOF10: 10
PAINLEVEL_OUTOF10: 10

## 2024-03-15 ASSESSMENT — ENCOUNTER SYMPTOMS
NAUSEA: 0
SHORTNESS OF BREATH: 0
ABDOMINAL PAIN: 0
DIARRHEA: 0
VOMITING: 0

## 2024-03-15 ASSESSMENT — LIFESTYLE VARIABLES
HOW MANY STANDARD DRINKS CONTAINING ALCOHOL DO YOU HAVE ON A TYPICAL DAY: PATIENT DOES NOT DRINK
HOW OFTEN DO YOU HAVE A DRINK CONTAINING ALCOHOL: NEVER

## 2024-03-15 NOTE — ED PROVIDER NOTES
tablets 12 tablet 0   • valsartan (DIOVAN) 80 MG tablet Take 1 tablet by mouth daily 90 tablet 3   • amLODIPine (NORVASC) 5 MG tablet Take 1 tablet by mouth daily     • apixaban (ELIQUIS) 5 MG TABS tablet Take 1 tablet by mouth 2 times daily     • ARIPiprazole (ABILIFY) 10 MG tablet Take 1 tablet by mouth daily     • atorvastatin (LIPITOR) 20 MG tablet Take 1 tablet by mouth daily     • citalopram (CELEXA) 20 MG tablet Take 1 tablet by mouth daily     • famotidine (PEPCID) 20 MG tablet Take 1 tablet by mouth daily (Patient not taking: Reported on 2/1/2024)     • levothyroxine (SYNTHROID) 75 MCG tablet Take 1 tablet by mouth every morning (before breakfast)     • metFORMIN (GLUCOPHAGE) 500 MG tablet Take 1 tablet by mouth 2 times daily (with meals)     • OXcarbazepine (TRILEPTAL) 300 MG tablet Take 1 tablet by mouth 2 times daily     • oxybutynin (DITROPAN-XL) 5 MG extended release tablet Take 1 tablet by mouth daily     • venlafaxine (EFFEXOR XR) 75 MG extended release capsule Take 1 capsule by mouth daily           PAST HISTORY       Past Medical History:  Past Medical History:   Diagnosis Date   • Asthma    • Atrial fibrillation (HCC)    • Bipolar 1 disorder (HCC)    • Essential hypertension    • Pneumonia        Past Surgical History:  Past Surgical History:   Procedure Laterality Date   • ORTHOPEDIC SURGERY         Family History:  History reviewed. No pertinent family history.    Social History:  Social History     Tobacco Use   • Smoking status: Never   • Smokeless tobacco: Never   Substance Use Topics   • Alcohol use: Yes     Comment: occ   • Drug use: Never       Allergies:  Allergies   Allergen Reactions   • Esomeprazole Magnesium Other (See Comments)   • Hydrochlorothiazide Other (See Comments)     Severe hyponatremia causing seizures   • Lisinopril Nausea And Vomiting       Records Review:  I reviewed and interpreted the nursing notes and and vital signs from today's visit, as well as the electronic

## 2024-03-15 NOTE — DISCHARGE INSTRUCTIONS
It was a pleasure taking care of you in our Emergency Department today.  We know that when you come to [facility], you are entrusting us with your health, comfort, and safety.  Our physicians and nurses honor that trust, and truly appreciate the opportunity to care for you and your loved ones.      We also value your feedback.  If you receive a survey about your Emergency Department experience today, please fill it out.  We care about our patients' feedback, and we listen to what you have to say.  Thank you!      Dr. Jessica Etienne MD.

## 2024-04-09 ENCOUNTER — APPOINTMENT (OUTPATIENT)
Facility: HOSPITAL | Age: 74
DRG: 644 | End: 2024-04-09
Payer: MEDICARE

## 2024-04-09 ENCOUNTER — HOSPITAL ENCOUNTER (INPATIENT)
Facility: HOSPITAL | Age: 74
LOS: 7 days | Discharge: HOME | DRG: 644 | End: 2024-04-16
Attending: STUDENT IN AN ORGANIZED HEALTH CARE EDUCATION/TRAINING PROGRAM | Admitting: INTERNAL MEDICINE
Payer: MEDICARE

## 2024-04-09 DIAGNOSIS — I48.91 ATRIAL FIBRILLATION WITH RVR (HCC): Primary | ICD-10-CM

## 2024-04-09 DIAGNOSIS — E87.1 HYPONATREMIA: ICD-10-CM

## 2024-04-09 DIAGNOSIS — I42.0 DILATED CARDIOMYOPATHY (HCC): ICD-10-CM

## 2024-04-09 LAB
ALBUMIN SERPL-MCNC: 3.7 G/DL (ref 3.5–5)
ALBUMIN/GLOB SERPL: 0.8 (ref 1.1–2.2)
ALP SERPL-CCNC: 132 U/L (ref 45–117)
ALT SERPL-CCNC: 31 U/L (ref 12–78)
ANION GAP SERPL CALC-SCNC: 7 MMOL/L (ref 5–15)
APPEARANCE UR: CLEAR
AST SERPL-CCNC: 58 U/L (ref 15–37)
BACTERIA URNS QL MICRO: ABNORMAL /HPF
BASOPHILS # BLD: 0 K/UL (ref 0–0.1)
BASOPHILS NFR BLD: 1 % (ref 0–1)
BILIRUB SERPL-MCNC: 0.5 MG/DL (ref 0.2–1)
BILIRUB UR QL: NEGATIVE
BUN SERPL-MCNC: 5 MG/DL (ref 6–20)
BUN/CREAT SERPL: 7 (ref 12–20)
CALCIUM SERPL-MCNC: 9.1 MG/DL (ref 8.5–10.1)
CHLORIDE SERPL-SCNC: 91 MMOL/L (ref 97–108)
CO2 SERPL-SCNC: 24 MMOL/L (ref 21–32)
COLOR UR: ABNORMAL
CREAT SERPL-MCNC: 0.68 MG/DL (ref 0.55–1.02)
DIFFERENTIAL METHOD BLD: ABNORMAL
EOSINOPHIL # BLD: 0.3 K/UL (ref 0–0.4)
EOSINOPHIL NFR BLD: 6 % (ref 0–7)
EPITH CASTS URNS QL MICRO: ABNORMAL /LPF
ERYTHROCYTE [DISTWIDTH] IN BLOOD BY AUTOMATED COUNT: 13.4 % (ref 11.5–14.5)
GLOBULIN SER CALC-MCNC: 4.8 G/DL (ref 2–4)
GLUCOSE BLD STRIP.AUTO-MCNC: 125 MG/DL (ref 65–117)
GLUCOSE SERPL-MCNC: 95 MG/DL (ref 65–100)
GLUCOSE UR STRIP.AUTO-MCNC: NEGATIVE MG/DL
HCT VFR BLD AUTO: 29.2 % (ref 35–47)
HGB BLD-MCNC: 9.6 G/DL (ref 11.5–16)
HGB UR QL STRIP: ABNORMAL
IMM GRANULOCYTES # BLD AUTO: 0 K/UL (ref 0–0.04)
IMM GRANULOCYTES NFR BLD AUTO: 0 % (ref 0–0.5)
KETONES UR QL STRIP.AUTO: NEGATIVE MG/DL
LEUKOCYTE ESTERASE UR QL STRIP.AUTO: ABNORMAL
LYMPHOCYTES # BLD: 1.4 K/UL (ref 0.8–3.5)
LYMPHOCYTES NFR BLD: 27 % (ref 12–49)
MAGNESIUM SERPL-MCNC: 1.7 MG/DL (ref 1.6–2.4)
MCH RBC QN AUTO: 30 PG (ref 26–34)
MCHC RBC AUTO-ENTMCNC: 32.9 G/DL (ref 30–36.5)
MCV RBC AUTO: 91.3 FL (ref 80–99)
MONOCYTES # BLD: 0.7 K/UL (ref 0–1)
MONOCYTES NFR BLD: 12 % (ref 5–13)
NEUTS SEG # BLD: 2.8 K/UL (ref 1.8–8)
NEUTS SEG NFR BLD: 54 % (ref 32–75)
NITRITE UR QL STRIP.AUTO: NEGATIVE
NRBC # BLD: 0 K/UL (ref 0–0.01)
NRBC BLD-RTO: 0 PER 100 WBC
NT PRO BNP: 370 PG/ML
PH UR STRIP: 6 (ref 5–8)
PLATELET # BLD AUTO: 348 K/UL (ref 150–400)
PMV BLD AUTO: 8.5 FL (ref 8.9–12.9)
POTASSIUM SERPL-SCNC: 4.2 MMOL/L (ref 3.5–5.1)
PROT SERPL-MCNC: 8.5 G/DL (ref 6.4–8.2)
PROT UR STRIP-MCNC: NEGATIVE MG/DL
RBC # BLD AUTO: 3.2 M/UL (ref 3.8–5.2)
RBC #/AREA URNS HPF: ABNORMAL /HPF (ref 0–5)
SERVICE CMNT-IMP: ABNORMAL
SODIUM SERPL-SCNC: 122 MMOL/L (ref 136–145)
SP GR UR REFRACTOMETRY: 1
TROPONIN I SERPL HS-MCNC: 15 NG/L (ref 0–51)
URINE CULTURE IF INDICATED: ABNORMAL
UROBILINOGEN UR QL STRIP.AUTO: 1 EU/DL (ref 0.2–1)
WBC # BLD AUTO: 5.3 K/UL (ref 3.6–11)
WBC URNS QL MICRO: ABNORMAL /HPF (ref 0–4)

## 2024-04-09 PROCEDURE — 93005 ELECTROCARDIOGRAM TRACING: CPT | Performed by: STUDENT IN AN ORGANIZED HEALTH CARE EDUCATION/TRAINING PROGRAM

## 2024-04-09 PROCEDURE — 71045 X-RAY EXAM CHEST 1 VIEW: CPT

## 2024-04-09 PROCEDURE — 99285 EMERGENCY DEPT VISIT HI MDM: CPT

## 2024-04-09 PROCEDURE — 2580000003 HC RX 258: Performed by: STUDENT IN AN ORGANIZED HEALTH CARE EDUCATION/TRAINING PROGRAM

## 2024-04-09 PROCEDURE — 96374 THER/PROPH/DIAG INJ IV PUSH: CPT

## 2024-04-09 PROCEDURE — 82962 GLUCOSE BLOOD TEST: CPT

## 2024-04-09 PROCEDURE — 81001 URINALYSIS AUTO W/SCOPE: CPT

## 2024-04-09 PROCEDURE — 83880 ASSAY OF NATRIURETIC PEPTIDE: CPT

## 2024-04-09 PROCEDURE — 36415 COLL VENOUS BLD VENIPUNCTURE: CPT

## 2024-04-09 PROCEDURE — 2500000003 HC RX 250 WO HCPCS: Performed by: STUDENT IN AN ORGANIZED HEALTH CARE EDUCATION/TRAINING PROGRAM

## 2024-04-09 PROCEDURE — 2580000003 HC RX 258: Performed by: INTERNAL MEDICINE

## 2024-04-09 PROCEDURE — 96376 TX/PRO/DX INJ SAME DRUG ADON: CPT

## 2024-04-09 PROCEDURE — 6370000000 HC RX 637 (ALT 250 FOR IP): Performed by: INTERNAL MEDICINE

## 2024-04-09 PROCEDURE — 85025 COMPLETE CBC W/AUTO DIFF WBC: CPT

## 2024-04-09 PROCEDURE — 6370000000 HC RX 637 (ALT 250 FOR IP): Performed by: STUDENT IN AN ORGANIZED HEALTH CARE EDUCATION/TRAINING PROGRAM

## 2024-04-09 PROCEDURE — 83735 ASSAY OF MAGNESIUM: CPT

## 2024-04-09 PROCEDURE — 80053 COMPREHEN METABOLIC PANEL: CPT

## 2024-04-09 PROCEDURE — 84484 ASSAY OF TROPONIN QUANT: CPT

## 2024-04-09 PROCEDURE — 2060000000 HC ICU INTERMEDIATE R&B

## 2024-04-09 RX ORDER — ONDANSETRON 2 MG/ML
4 INJECTION INTRAMUSCULAR; INTRAVENOUS EVERY 6 HOURS PRN
Status: DISCONTINUED | OUTPATIENT
Start: 2024-04-09 | End: 2024-04-16 | Stop reason: HOSPADM

## 2024-04-09 RX ORDER — INSULIN LISPRO 100 [IU]/ML
0-4 INJECTION, SOLUTION INTRAVENOUS; SUBCUTANEOUS
Status: DISCONTINUED | OUTPATIENT
Start: 2024-04-09 | End: 2024-04-16 | Stop reason: HOSPADM

## 2024-04-09 RX ORDER — GABAPENTIN 300 MG/1
300 CAPSULE ORAL 3 TIMES DAILY
COMMUNITY

## 2024-04-09 RX ORDER — SENNOSIDES 8.6 MG
650 CAPSULE ORAL EVERY 8 HOURS PRN
COMMUNITY

## 2024-04-09 RX ORDER — SODIUM CHLORIDE 9 MG/ML
INJECTION, SOLUTION INTRAVENOUS PRN
Status: DISCONTINUED | OUTPATIENT
Start: 2024-04-09 | End: 2024-04-16 | Stop reason: HOSPADM

## 2024-04-09 RX ORDER — OXCARBAZEPINE 300 MG/1
300 TABLET, FILM COATED ORAL 2 TIMES DAILY
Status: DISCONTINUED | OUTPATIENT
Start: 2024-04-09 | End: 2024-04-16 | Stop reason: HOSPADM

## 2024-04-09 RX ORDER — GABAPENTIN 300 MG/1
300 CAPSULE ORAL 3 TIMES DAILY
Status: DISCONTINUED | OUTPATIENT
Start: 2024-04-09 | End: 2024-04-16 | Stop reason: HOSPADM

## 2024-04-09 RX ORDER — LEVOTHYROXINE SODIUM 0.07 MG/1
75 TABLET ORAL
Status: DISCONTINUED | OUTPATIENT
Start: 2024-04-10 | End: 2024-04-16 | Stop reason: HOSPADM

## 2024-04-09 RX ORDER — ACETAMINOPHEN 325 MG/1
650 TABLET ORAL EVERY 6 HOURS PRN
Status: DISCONTINUED | OUTPATIENT
Start: 2024-04-09 | End: 2024-04-16 | Stop reason: HOSPADM

## 2024-04-09 RX ORDER — SODIUM CHLORIDE 9 MG/ML
INJECTION, SOLUTION INTRAVENOUS CONTINUOUS
Status: ACTIVE | OUTPATIENT
Start: 2024-04-09 | End: 2024-04-10

## 2024-04-09 RX ORDER — ACETAMINOPHEN 650 MG/1
650 SUPPOSITORY RECTAL EVERY 6 HOURS PRN
Status: DISCONTINUED | OUTPATIENT
Start: 2024-04-09 | End: 2024-04-16 | Stop reason: HOSPADM

## 2024-04-09 RX ORDER — ARIPIPRAZOLE 5 MG/1
10 TABLET ORAL DAILY
Status: DISCONTINUED | OUTPATIENT
Start: 2024-04-09 | End: 2024-04-16 | Stop reason: HOSPADM

## 2024-04-09 RX ORDER — ENOXAPARIN SODIUM 100 MG/ML
40 INJECTION SUBCUTANEOUS DAILY
Status: DISCONTINUED | OUTPATIENT
Start: 2024-04-09 | End: 2024-04-09

## 2024-04-09 RX ORDER — SODIUM CHLORIDE 0.9 % (FLUSH) 0.9 %
5-40 SYRINGE (ML) INJECTION PRN
Status: DISCONTINUED | OUTPATIENT
Start: 2024-04-09 | End: 2024-04-16 | Stop reason: HOSPADM

## 2024-04-09 RX ORDER — INSULIN LISPRO 100 [IU]/ML
0-4 INJECTION, SOLUTION INTRAVENOUS; SUBCUTANEOUS NIGHTLY
Status: DISCONTINUED | OUTPATIENT
Start: 2024-04-09 | End: 2024-04-16 | Stop reason: HOSPADM

## 2024-04-09 RX ORDER — SODIUM CHLORIDE 0.9 % (FLUSH) 0.9 %
5-40 SYRINGE (ML) INJECTION EVERY 12 HOURS SCHEDULED
Status: DISCONTINUED | OUTPATIENT
Start: 2024-04-09 | End: 2024-04-16 | Stop reason: HOSPADM

## 2024-04-09 RX ORDER — VALSARTAN 80 MG/1
80 TABLET ORAL DAILY
Status: DISCONTINUED | OUTPATIENT
Start: 2024-04-09 | End: 2024-04-10

## 2024-04-09 RX ORDER — VENLAFAXINE HYDROCHLORIDE 37.5 MG/1
75 CAPSULE, EXTENDED RELEASE ORAL DAILY
Status: DISCONTINUED | OUTPATIENT
Start: 2024-04-09 | End: 2024-04-16 | Stop reason: HOSPADM

## 2024-04-09 RX ORDER — DILTIAZEM HYDROCHLORIDE 5 MG/ML
10 INJECTION INTRAVENOUS
Status: COMPLETED | OUTPATIENT
Start: 2024-04-09 | End: 2024-04-09

## 2024-04-09 RX ORDER — POLYETHYLENE GLYCOL 3350 17 G/17G
17 POWDER, FOR SOLUTION ORAL DAILY PRN
Status: DISCONTINUED | OUTPATIENT
Start: 2024-04-09 | End: 2024-04-16 | Stop reason: HOSPADM

## 2024-04-09 RX ORDER — ONDANSETRON 4 MG/1
4 TABLET, ORALLY DISINTEGRATING ORAL EVERY 8 HOURS PRN
Status: DISCONTINUED | OUTPATIENT
Start: 2024-04-09 | End: 2024-04-16 | Stop reason: HOSPADM

## 2024-04-09 RX ORDER — ACETAMINOPHEN 325 MG/1
650 TABLET ORAL EVERY 4 HOURS PRN
Status: DISCONTINUED | OUTPATIENT
Start: 2024-04-09 | End: 2024-04-10

## 2024-04-09 RX ORDER — OXYBUTYNIN CHLORIDE 5 MG/1
5 TABLET, EXTENDED RELEASE ORAL DAILY
Status: DISCONTINUED | OUTPATIENT
Start: 2024-04-09 | End: 2024-04-16 | Stop reason: HOSPADM

## 2024-04-09 RX ADMIN — OXYBUTYNIN CHLORIDE 5 MG: 5 TABLET, EXTENDED RELEASE ORAL at 21:59

## 2024-04-09 RX ADMIN — ACETAMINOPHEN 650 MG: 325 TABLET ORAL at 19:28

## 2024-04-09 RX ADMIN — VALSARTAN 80 MG: 80 TABLET, FILM COATED ORAL at 21:59

## 2024-04-09 RX ADMIN — VENLAFAXINE HYDROCHLORIDE 75 MG: 37.5 CAPSULE, EXTENDED RELEASE ORAL at 22:04

## 2024-04-09 RX ADMIN — ARIPIPRAZOLE 10 MG: 5 TABLET ORAL at 22:04

## 2024-04-09 RX ADMIN — OXCARBAZEPINE 300 MG: 300 TABLET, FILM COATED ORAL at 21:59

## 2024-04-09 RX ADMIN — SODIUM CHLORIDE: 9 INJECTION, SOLUTION INTRAVENOUS at 23:21

## 2024-04-09 RX ADMIN — SODIUM CHLORIDE, PRESERVATIVE FREE 10 ML: 5 INJECTION INTRAVENOUS at 22:08

## 2024-04-09 RX ADMIN — DILTIAZEM HYDROCHLORIDE 10 MG: 5 INJECTION, SOLUTION INTRAVENOUS at 13:49

## 2024-04-09 RX ADMIN — GABAPENTIN 300 MG: 300 CAPSULE ORAL at 21:59

## 2024-04-09 RX ADMIN — APIXABAN 5 MG: 5 TABLET, FILM COATED ORAL at 21:59

## 2024-04-09 RX ADMIN — SODIUM CHLORIDE 5 MG/HR: 900 INJECTION, SOLUTION INTRAVENOUS at 14:39

## 2024-04-09 ASSESSMENT — PAIN DESCRIPTION - DESCRIPTORS: DESCRIPTORS: ACHING

## 2024-04-09 ASSESSMENT — PAIN SCALES - GENERAL: PAINLEVEL_OUTOF10: 5

## 2024-04-09 ASSESSMENT — PAIN DESCRIPTION - LOCATION: LOCATION: LEG

## 2024-04-09 ASSESSMENT — PAIN DESCRIPTION - ORIENTATION: ORIENTATION: RIGHT;LEFT

## 2024-04-09 NOTE — ED PROVIDER NOTES
EMERGENCY DEPARTMENT HISTORY AND PHYSICAL EXAM      Date: 4/9/2024  Patient Name: Darshana Kraus    History of Presenting Illness     Chief Complaint   Patient presents with    Abnormal Lab     Pt arrives ambulatory to triage, reports she had blood drawn last week, PCP called today and told patient to come to ED for low NA, Pt unsure of level. Reports hx of hyponatremia.          HPI: History From: patient, History limited by: none  Darshana Kraus, 73 y.o. female presents to the ED with cc of abnormal laboratory work.  She states that on Friday her primary care doctor did lab work, and then called her and told her to come to the hospital for dangerously low salt.  She states that this happened to her once previously and required her to be admitted to the hospital.  Over the past week, she has been feeling more tired than usual.  She says that she has had a good appetite but does not eat much salt.  For the past week she has also had a few episodes of diarrhea every day.  No vomiting, no chest pain, no shortness of breath.  She reports a history of atrial fibrillation for which she takes Eliquis, she has been compliant with all of her medications          There are no other complaints, changes, or physical findings at this time.    PCP: Zaid oCnti, DO    No current facility-administered medications on file prior to encounter.     Current Outpatient Medications on File Prior to Encounter   Medication Sig Dispense Refill    gabapentin (NEURONTIN) 300 MG capsule Take 1 capsule by mouth 3 times daily.      acetaminophen (TYLENOL 8 HOUR ARTHRITIS PAIN) 650 MG extended release tablet Take 1 tablet by mouth every 8 hours as needed for Pain      valsartan (DIOVAN) 80 MG tablet Take 1 tablet by mouth daily 90 tablet 3    apixaban (ELIQUIS) 5 MG TABS tablet Take 1 tablet by mouth 2 times daily      ARIPiprazole (ABILIFY) 10 MG tablet Take 1 tablet by mouth daily      atorvastatin (LIPITOR) 20 MG tablet Take 1 tablet by mouth

## 2024-04-10 LAB
ALBUMIN SERPL-MCNC: 3.7 G/DL (ref 3.5–5)
ALBUMIN/GLOB SERPL: 0.8 (ref 1.1–2.2)
ALP SERPL-CCNC: 131 U/L (ref 45–117)
ALT SERPL-CCNC: 30 U/L (ref 12–78)
ANION GAP SERPL CALC-SCNC: 4 MMOL/L (ref 5–15)
ANION GAP SERPL CALC-SCNC: 8 MMOL/L (ref 5–15)
AST SERPL-CCNC: 55 U/L (ref 15–37)
BASOPHILS # BLD: 0 K/UL (ref 0–0.1)
BASOPHILS NFR BLD: 1 % (ref 0–1)
BILIRUB SERPL-MCNC: 0.9 MG/DL (ref 0.2–1)
BUN SERPL-MCNC: 9 MG/DL (ref 6–20)
BUN SERPL-MCNC: 9 MG/DL (ref 6–20)
BUN/CREAT SERPL: 13 (ref 12–20)
BUN/CREAT SERPL: 14 (ref 12–20)
CALCIUM SERPL-MCNC: 9.4 MG/DL (ref 8.5–10.1)
CALCIUM SERPL-MCNC: 9.4 MG/DL (ref 8.5–10.1)
CHLORIDE SERPL-SCNC: 92 MMOL/L (ref 97–108)
CHLORIDE SERPL-SCNC: 95 MMOL/L (ref 97–108)
CHOLEST SERPL-MCNC: 154 MG/DL
CO2 SERPL-SCNC: 23 MMOL/L (ref 21–32)
CO2 SERPL-SCNC: 25 MMOL/L (ref 21–32)
CREAT SERPL-MCNC: 0.63 MG/DL (ref 0.55–1.02)
CREAT SERPL-MCNC: 0.68 MG/DL (ref 0.55–1.02)
CREAT UR-MCNC: 57.7 MG/DL
DIFFERENTIAL METHOD BLD: ABNORMAL
EKG ATRIAL RATE: 166 BPM
EKG DIAGNOSIS: NORMAL
EKG Q-T INTERVAL: 288 MS
EKG QRS DURATION: 98 MS
EKG QTC CALCULATION (BAZETT): 443 MS
EKG R AXIS: -22 DEGREES
EKG T AXIS: 90 DEGREES
EKG VENTRICULAR RATE: 142 BPM
EOSINOPHIL # BLD: 0.2 K/UL (ref 0–0.4)
EOSINOPHIL NFR BLD: 3 % (ref 0–7)
ERYTHROCYTE [DISTWIDTH] IN BLOOD BY AUTOMATED COUNT: 13.7 % (ref 11.5–14.5)
GLOBULIN SER CALC-MCNC: 4.7 G/DL (ref 2–4)
GLUCOSE BLD STRIP.AUTO-MCNC: 116 MG/DL (ref 65–117)
GLUCOSE BLD STRIP.AUTO-MCNC: 125 MG/DL (ref 65–117)
GLUCOSE BLD STRIP.AUTO-MCNC: 127 MG/DL (ref 65–117)
GLUCOSE BLD STRIP.AUTO-MCNC: 128 MG/DL (ref 65–117)
GLUCOSE SERPL-MCNC: 124 MG/DL (ref 65–100)
GLUCOSE SERPL-MCNC: 133 MG/DL (ref 65–100)
HCT VFR BLD AUTO: 34 % (ref 35–47)
HDLC SERPL-MCNC: 67 MG/DL
HDLC SERPL: 2.3 (ref 0–5)
HGB BLD-MCNC: 11.1 G/DL (ref 11.5–16)
IMM GRANULOCYTES # BLD AUTO: 0 K/UL (ref 0–0.04)
IMM GRANULOCYTES NFR BLD AUTO: 1 % (ref 0–0.5)
LDLC SERPL CALC-MCNC: 72 MG/DL (ref 0–100)
LYMPHOCYTES # BLD: 1 K/UL (ref 0.8–3.5)
LYMPHOCYTES NFR BLD: 12 % (ref 12–49)
MCH RBC QN AUTO: 29.8 PG (ref 26–34)
MCHC RBC AUTO-ENTMCNC: 32.6 G/DL (ref 30–36.5)
MCV RBC AUTO: 91.4 FL (ref 80–99)
MONOCYTES # BLD: 0.9 K/UL (ref 0–1)
MONOCYTES NFR BLD: 11 % (ref 5–13)
NEUTS SEG # BLD: 6 K/UL (ref 1.8–8)
NEUTS SEG NFR BLD: 72 % (ref 32–75)
NRBC # BLD: 0 K/UL (ref 0–0.01)
NRBC BLD-RTO: 0 PER 100 WBC
OSMOLALITY SERPL: 269 MOSM/KG H2O
OSMOLALITY UR: 311 MOSM/KG H2O
OSMOLALITY UR: 434 MOSM/KG H2O
PHOSPHATE SERPL-MCNC: 3.4 MG/DL (ref 2.6–4.7)
PLATELET # BLD AUTO: 368 K/UL (ref 150–400)
PMV BLD AUTO: 8.7 FL (ref 8.9–12.9)
POTASSIUM SERPL-SCNC: 4.2 MMOL/L (ref 3.5–5.1)
POTASSIUM SERPL-SCNC: 4.4 MMOL/L (ref 3.5–5.1)
PROT SERPL-MCNC: 8.4 G/DL (ref 6.4–8.2)
RBC # BLD AUTO: 3.72 M/UL (ref 3.8–5.2)
SERVICE CMNT-IMP: ABNORMAL
SERVICE CMNT-IMP: NORMAL
SODIUM SERPL-SCNC: 123 MMOL/L (ref 136–145)
SODIUM SERPL-SCNC: 124 MMOL/L (ref 136–145)
TRIGL SERPL-MCNC: 75 MG/DL
TSH SERPL DL<=0.05 MIU/L-ACNC: 2.86 UIU/ML (ref 0.36–3.74)
VLDLC SERPL CALC-MCNC: 15 MG/DL
WBC # BLD AUTO: 8.2 K/UL (ref 3.6–11)

## 2024-04-10 PROCEDURE — 82962 GLUCOSE BLOOD TEST: CPT

## 2024-04-10 PROCEDURE — 6360000002 HC RX W HCPCS: Performed by: STUDENT IN AN ORGANIZED HEALTH CARE EDUCATION/TRAINING PROGRAM

## 2024-04-10 PROCEDURE — 2060000000 HC ICU INTERMEDIATE R&B

## 2024-04-10 PROCEDURE — 84100 ASSAY OF PHOSPHORUS: CPT

## 2024-04-10 PROCEDURE — 80061 LIPID PANEL: CPT

## 2024-04-10 PROCEDURE — 83935 ASSAY OF URINE OSMOLALITY: CPT

## 2024-04-10 PROCEDURE — 2500000003 HC RX 250 WO HCPCS: Performed by: STUDENT IN AN ORGANIZED HEALTH CARE EDUCATION/TRAINING PROGRAM

## 2024-04-10 PROCEDURE — 2580000003 HC RX 258: Performed by: INTERNAL MEDICINE

## 2024-04-10 PROCEDURE — 84443 ASSAY THYROID STIM HORMONE: CPT

## 2024-04-10 PROCEDURE — 85025 COMPLETE CBC W/AUTO DIFF WBC: CPT

## 2024-04-10 PROCEDURE — 2580000003 HC RX 258: Performed by: STUDENT IN AN ORGANIZED HEALTH CARE EDUCATION/TRAINING PROGRAM

## 2024-04-10 PROCEDURE — 36415 COLL VENOUS BLD VENIPUNCTURE: CPT

## 2024-04-10 PROCEDURE — 6370000000 HC RX 637 (ALT 250 FOR IP): Performed by: INTERNAL MEDICINE

## 2024-04-10 PROCEDURE — 82570 ASSAY OF URINE CREATININE: CPT

## 2024-04-10 PROCEDURE — 6370000000 HC RX 637 (ALT 250 FOR IP): Performed by: STUDENT IN AN ORGANIZED HEALTH CARE EDUCATION/TRAINING PROGRAM

## 2024-04-10 PROCEDURE — 83930 ASSAY OF BLOOD OSMOLALITY: CPT

## 2024-04-10 PROCEDURE — 80053 COMPREHEN METABOLIC PANEL: CPT

## 2024-04-10 RX ORDER — FUROSEMIDE 10 MG/ML
20 INJECTION INTRAMUSCULAR; INTRAVENOUS ONCE
Status: COMPLETED | OUTPATIENT
Start: 2024-04-10 | End: 2024-04-10

## 2024-04-10 RX ORDER — FLUCONAZOLE 100 MG/1
150 TABLET ORAL DAILY
Status: COMPLETED | OUTPATIENT
Start: 2024-04-10 | End: 2024-04-12

## 2024-04-10 RX ORDER — CLOTRIMAZOLE 1 %
CREAM WITH APPLICATOR VAGINAL DAILY
Status: DISCONTINUED | OUTPATIENT
Start: 2024-04-10 | End: 2024-04-10 | Stop reason: ALTCHOICE

## 2024-04-10 RX ORDER — LANOLIN ALCOHOL/MO/W.PET/CERES
400 CREAM (GRAM) TOPICAL 2 TIMES DAILY
Status: DISCONTINUED | OUTPATIENT
Start: 2024-04-10 | End: 2024-04-16 | Stop reason: HOSPADM

## 2024-04-10 RX ORDER — ATENOLOL 25 MG/1
50 TABLET ORAL DAILY
Status: DISCONTINUED | OUTPATIENT
Start: 2024-04-10 | End: 2024-04-13

## 2024-04-10 RX ADMIN — MICONAZOLE NITRATE: 20 CREAM TOPICAL at 21:30

## 2024-04-10 RX ADMIN — ARIPIPRAZOLE 10 MG: 5 TABLET ORAL at 09:58

## 2024-04-10 RX ADMIN — GABAPENTIN 300 MG: 300 CAPSULE ORAL at 13:43

## 2024-04-10 RX ADMIN — VALSARTAN 80 MG: 80 TABLET, FILM COATED ORAL at 09:58

## 2024-04-10 RX ADMIN — ATENOLOL 50 MG: 25 TABLET ORAL at 15:31

## 2024-04-10 RX ADMIN — GABAPENTIN 300 MG: 300 CAPSULE ORAL at 21:18

## 2024-04-10 RX ADMIN — SODIUM CHLORIDE, PRESERVATIVE FREE 10 ML: 5 INJECTION INTRAVENOUS at 20:36

## 2024-04-10 RX ADMIN — GABAPENTIN 300 MG: 300 CAPSULE ORAL at 09:58

## 2024-04-10 RX ADMIN — SODIUM CHLORIDE, PRESERVATIVE FREE 10 ML: 5 INJECTION INTRAVENOUS at 10:01

## 2024-04-10 RX ADMIN — VENLAFAXINE HYDROCHLORIDE 75 MG: 37.5 CAPSULE, EXTENDED RELEASE ORAL at 09:59

## 2024-04-10 RX ADMIN — OXYBUTYNIN CHLORIDE 5 MG: 5 TABLET, EXTENDED RELEASE ORAL at 09:58

## 2024-04-10 RX ADMIN — FUROSEMIDE 20 MG: 10 INJECTION, SOLUTION INTRAMUSCULAR; INTRAVENOUS at 15:33

## 2024-04-10 RX ADMIN — Medication 400 MG: at 21:18

## 2024-04-10 RX ADMIN — LEVOTHYROXINE SODIUM 75 MCG: 0.07 TABLET ORAL at 06:44

## 2024-04-10 RX ADMIN — SODIUM CHLORIDE 5 MG/HR: 900 INJECTION, SOLUTION INTRAVENOUS at 11:50

## 2024-04-10 RX ADMIN — OXCARBAZEPINE 300 MG: 300 TABLET, FILM COATED ORAL at 09:59

## 2024-04-10 RX ADMIN — SODIUM CHLORIDE 7.5 MG/HR: 900 INJECTION, SOLUTION INTRAVENOUS at 02:04

## 2024-04-10 RX ADMIN — OXCARBAZEPINE 300 MG: 300 TABLET, FILM COATED ORAL at 21:18

## 2024-04-10 RX ADMIN — APIXABAN 5 MG: 5 TABLET, FILM COATED ORAL at 21:18

## 2024-04-10 RX ADMIN — ACETAMINOPHEN 650 MG: 325 TABLET ORAL at 21:18

## 2024-04-10 RX ADMIN — APIXABAN 5 MG: 5 TABLET, FILM COATED ORAL at 09:59

## 2024-04-10 RX ADMIN — FLUCONAZOLE 150 MG: 100 TABLET ORAL at 15:31

## 2024-04-10 ASSESSMENT — PAIN SCALES - GENERAL
PAINLEVEL_OUTOF10: 0

## 2024-04-10 NOTE — CARE COORDINATION
Care Management Initial Assessment       RUR:11%  Readmission? No  1st IM letter given? Yes -     1st  letter given: No     04/10/24 1616   Service Assessment   Patient Orientation Alert and Oriented   Cognition Alert   History Provided By Patient   Primary Caregiver Self   Support Systems Spouse/Significant Other   Patient's Healthcare Decision Maker is: Legal Next of Kin   PCP Verified by CM Yes   Last Visit to PCP Within last 3 months   Prior Functional Level Independent in ADLs/IADLs   Current Functional Level Independent in ADLs/IADLs   Can patient return to prior living arrangement Yes   Ability to make needs known: Good   Family able to assist with home care needs: Yes   Financial Resources Medicare   Social/Functional History   Lives With Significant other   Type of Home House   Home Layout One level   Home Access   (0 steps, inlaw suite with daughter)   Bathroom Equipment None   Home Equipment Cane   Receives Help From Family   ADL Assistance Independent   Homemaking Assistance Independent   Ambulation Assistance Independent   Transfer Assistance Independent   Occupation Retired   Discharge Planning   Potential Assistance Needed N/A   DME Ordered? No   Services At/After Discharge    Resource Information Provided? No     CM met with patient, introduced self, explained role and offered assistance  She lives with her boyfriend in his daughter's in law suite.  Cane is the only DME. Regular follow up with PCP    No needs identified for DC but CM will continue to follow    English Katerin STRONG CM   [id number]      Advance Care Planning     General Advance Care Planning (ACP) Conversation    Date of Conversation: 4/10/2024  Conducted with: Patient with Decision Making Capacity    Healthcare Decision Maker:  No healthcare decision makers have been documented.  Click here to complete HealthCare Decision Makers including selection of the Healthcare Decision Maker Relationship (ie \"Primary\")   Today we documented

## 2024-04-10 NOTE — CONSULTS
[facility]  [address], [place] [address] [phone]  [email or web address]       CARDIOLOGY CONSULTATION       Date of  Admission: 4/9/2024 12:55 PM     Admission type:Emergency   Primary Care Physician:Zaid Conti DO     Attending Provider: Janeth Larsen MD  Cardiology Provider: Yeison Barrett    CC/REASON FOR CONSULT: Atrial fibrillation     Subjective:     Darshana Kraus is a 73 y.o. female admitted for Hyponatremia [E87.1]  A-fib (HCC) [I48.91]  Atrial fibrillation with RVR (HCC) [I48.91].    The patient was seen and examined at the bedside.  She reports a complaint of feeling tired.  She was sent from cardiology office for low sodium level.  Denies any chest pain.  Although she does have longstanding shortness of breath even at rest.      Patient Active Problem List    Diagnosis Date Noted    A-fib (HCC) 04/09/2024    PAF (paroxysmal atrial fibrillation) (HCC) 09/08/2022    Primary hypertension 09/08/2022    Dilated cardiomyopathy (HCC) 09/08/2022    Bipolar 1 disorder (HCC) 09/08/2022    Hypoxia 07/16/2020      Zaid Conti DO  Past Medical History:   Diagnosis Date    Asthma     Atrial fibrillation (HCC)     Bipolar 1 disorder (HCC)     Essential hypertension     Pneumonia       Social History     Socioeconomic History    Marital status:    Tobacco Use    Smoking status: Never    Smokeless tobacco: Never   Substance and Sexual Activity    Alcohol use: Yes     Comment: occ    Drug use: Never     Social Determinants of Health     Food Insecurity: No Food Insecurity (4/9/2024)    Hunger Vital Sign     Worried About Running Out of Food in the Last Year: Never true     Ran Out of Food in the Last Year: Never true   Transportation Needs: No Transportation Needs (4/9/2024)    PRAPARE - Transportation     Lack of Transportation (Medical): No     Lack of Transportation (Non-Medical): No   Housing Stability: Low Risk  (4/9/2024)    Housing

## 2024-04-10 NOTE — WOUND CARE
Wound care nurse consult for rash to perineum.    72 y/o female admitted for a-fib.   Past Medical History:   Diagnosis Date    Asthma     Atrial fibrillation (HCC)     Bipolar 1 disorder (HCC)     Essential hypertension     Pneumonia      Past Surgical History:   Procedure Laterality Date    ORTHOPEDIC SURGERY       Patient has a significant yeast type rash to her inner thighs and perineum. She brought in a filled prescription for clotrimazole cream and some Diflucan. Dr Mosqueda Perfectserved to notify need for this medicines to be given here at [facility] while inpatient.      LINDSAY SPICER RN, CWON

## 2024-04-10 NOTE — H&P
(A) NEG /hpf    Urine Culture if Indicated CULTURE NOT INDICATED BY UA RESULT CNI     POCT Glucose    Collection Time: 04/09/24 10:00 PM   Result Value Ref Range    POC Glucose 125 (H) 65 - 117 mg/dL    Performed by: Dexter Pichardo PCT          XR CHEST PORTABLE    Result Date: 4/9/2024  EXAM:  XR CHEST PORTABLE INDICATION:   SOB COMPARISON: 2/10/2023 radiographs. TECHNIQUE: Portable frontal view radiograph of the chest was acquired. FINDINGS: Lungs: No evidence of focal consolidation. Pleura: No pleural effusion or pneumothorax. Mediastinum: Heart, gagan, mediastinum are within normal limits. Upper abdomen/soft tissue: Unremarkable. MSK: No acute osseous abnormalities.     No acute cardiopulmonary process.     XR FOOT RIGHT (MIN 3 VIEWS)    Result Date: 3/15/2024  EXAM: XR FOOT RIGHT (MIN 3 VIEWS) INDICATION: Right foot pain. COMPARISON: None. FINDINGS: Three views of the right foot demonstrate no acute fracture or bony erosion. There is mild to moderate first MTP joint degenerative change. There there is a small plantar calcaneal spur. Orthopedic hardware is noted in the first through fourth digits.     No acute abnormality. Mild to moderate first MTP joint degenerative change.     _______________________________________________________________________    TOTAL TIME:  76 Minutes    Critical Care Provided     Minutes non procedure based    Signed: Janeth Larsen MD    Procedures: see electronic medical records for all procedures/Xrays and details which were not copied into this note but were reviewed prior to creation of Plan.

## 2024-04-10 NOTE — ED NOTES
TRANSFER - OUT REPORT:    Verbal report given to LIGIA Toussaint on Darshana Kraus  being transferred to 2136 for routine progression of patient care       Report consisted of patient's Situation, Background, Assessment and   Recommendations(SBAR).     Information from the following report(s) ED SBAR was reviewed with the receiving nurse.    [place] Fall Assessment:    Presents to emergency department  because of falls (Syncope, seizure, or loss of consciousness): No  Age > 70: Yes  Altered Mental Status, Intoxication with alcohol or substance confusion (Disorientation, impaired judgment, poor safety awaremess, or inability to follow instructions): No  Impaired Mobility: Ambulates or transfers with assistive devices or assistance; Unable to ambulate or transer.: No  Nursing Judgement: Yes          Lines:   Peripheral IV 04/09/24 Right Antecubital (Active)        Opportunity for questions and clarification was provided.      Patient transported with:  Registered Nurse

## 2024-04-10 NOTE — CONSULTS
Consultation Note    NAME: Darshana Kraus   :  1950   MRN:  [id number]     Date/Time:  4/10/2024 11:14 AM    I have been asked to see this patient by Dr. Larsen  for advice/opinion re: hyponatremia.     Assessment :    Plan:  Hyponatremia  Bipolar I suspect hypovolemic hyponatremia given that she says that po intake has been poor for at least a week.    She got some IVF.  I will repeat a BMP now.  Start a fluid restriction.    Oxcarbazepine can lower sodium.  I would continue for now.         Subjective:   CHIEF COMPLAINT:  \"I haven't been eating much.\"    HISTORY OF PRESENT ILLNESS:     Darshana is a 73 y.o.   female who has a history of prior hyponatremia admitted with the same.  She is a limited historian.  She says that her po intake has been poor for at least a week.  She says that she has been drinking water.      Past Medical History:   Diagnosis Date    Asthma     Atrial fibrillation (HCC)     Bipolar 1 disorder (HCC)     Essential hypertension     Pneumonia       Past Surgical History:   Procedure Laterality Date    ORTHOPEDIC SURGERY       Social History     Tobacco Use    Smoking status: Never    Smokeless tobacco: Never   Substance Use Topics    Alcohol use: Yes     Comment: occ      No family history on file.   Allergies   Allergen Reactions    Esomeprazole Magnesium Other (See Comments)    Hydrochlorothiazide Other (See Comments)     Severe hyponatremia causing seizures    Lisinopril Nausea And Vomiting      Prior to Admission medications    Medication Sig Start Date End Date Taking? Authorizing Provider   gabapentin (NEURONTIN) 300 MG capsule Take 1 capsule by mouth 3 times daily.   Yes ProviderDakota MD   acetaminophen (TYLENOL 8 HOUR ARTHRITIS PAIN) 650 MG extended release tablet Take 1 tablet by mouth every 8 hours as needed for Pain   Yes Dakota Gee MD   valsartan (DIOVAN) 80 MG tablet Take 1 tablet by mouth daily 24   Yeison Barrett MD   apixaban (ELIQUIS) 5 MG TABS

## 2024-04-10 NOTE — PLAN OF CARE
Problem: Discharge Planning  Goal: Discharge to home or other facility with appropriate resources  4/10/2024 0013 by Breana Mcdonough, RN  Outcome: Progressing  Flowsheets (Taken 4/9/2024 2200)  Discharge to home or other facility with appropriate resources: Identify barriers to discharge with patient and caregiver     Problem: Pain  Goal: Verbalizes/displays adequate comfort level or baseline comfort level  4/10/2024 1122 by Mihaela Leyva RN  Outcome: Progressing  4/10/2024 0013 by Breana Mcdonough RN  Outcome: Progressing     Problem: Safety - Adult  Goal: Free from fall injury  4/10/2024 1122 by Mihaela Leyva RN  Outcome: Progressing  4/10/2024 0013 by Breana Mcdonough RN  Outcome: Progressing     Problem: ABCDS Injury Assessment  Goal: Absence of physical injury  4/10/2024 0013 by Breana Mcdonough, RN  Outcome: Progressing

## 2024-04-11 LAB
ALBUMIN SERPL-MCNC: 3.5 G/DL (ref 3.5–5)
ALBUMIN/GLOB SERPL: 0.7 (ref 1.1–2.2)
ALP SERPL-CCNC: 126 U/L (ref 45–117)
ALT SERPL-CCNC: 26 U/L (ref 12–78)
ANION GAP SERPL CALC-SCNC: 6 MMOL/L (ref 5–15)
AST SERPL-CCNC: 43 U/L (ref 15–37)
BASOPHILS # BLD: 0 K/UL (ref 0–0.1)
BASOPHILS NFR BLD: 1 % (ref 0–1)
BILIRUB SERPL-MCNC: 0.4 MG/DL (ref 0.2–1)
BUN SERPL-MCNC: 13 MG/DL (ref 6–20)
BUN/CREAT SERPL: 16 (ref 12–20)
CALCIUM SERPL-MCNC: 9.4 MG/DL (ref 8.5–10.1)
CHLORIDE SERPL-SCNC: 95 MMOL/L (ref 97–108)
CO2 SERPL-SCNC: 25 MMOL/L (ref 21–32)
CREAT SERPL-MCNC: 0.8 MG/DL (ref 0.55–1.02)
DIFFERENTIAL METHOD BLD: ABNORMAL
EOSINOPHIL # BLD: 0.4 K/UL (ref 0–0.4)
EOSINOPHIL NFR BLD: 5 % (ref 0–7)
ERYTHROCYTE [DISTWIDTH] IN BLOOD BY AUTOMATED COUNT: 14.4 % (ref 11.5–14.5)
GLOBULIN SER CALC-MCNC: 4.8 G/DL (ref 2–4)
GLUCOSE BLD STRIP.AUTO-MCNC: 114 MG/DL (ref 65–117)
GLUCOSE BLD STRIP.AUTO-MCNC: 131 MG/DL (ref 65–117)
GLUCOSE BLD STRIP.AUTO-MCNC: 136 MG/DL (ref 65–117)
GLUCOSE BLD STRIP.AUTO-MCNC: 142 MG/DL (ref 65–117)
GLUCOSE SERPL-MCNC: 126 MG/DL (ref 65–100)
HCT VFR BLD AUTO: 33.4 % (ref 35–47)
HGB BLD-MCNC: 10.7 G/DL (ref 11.5–16)
IMM GRANULOCYTES # BLD AUTO: 0 K/UL (ref 0–0.04)
IMM GRANULOCYTES NFR BLD AUTO: 0 % (ref 0–0.5)
LYMPHOCYTES # BLD: 1.1 K/UL (ref 0.8–3.5)
LYMPHOCYTES NFR BLD: 15 % (ref 12–49)
MCH RBC QN AUTO: 30.4 PG (ref 26–34)
MCHC RBC AUTO-ENTMCNC: 32 G/DL (ref 30–36.5)
MCV RBC AUTO: 94.9 FL (ref 80–99)
MONOCYTES # BLD: 1 K/UL (ref 0–1)
MONOCYTES NFR BLD: 14 % (ref 5–13)
NEUTS SEG # BLD: 4.5 K/UL (ref 1.8–8)
NEUTS SEG NFR BLD: 65 % (ref 32–75)
NRBC # BLD: 0 K/UL (ref 0–0.01)
NRBC BLD-RTO: 0 PER 100 WBC
PLATELET # BLD AUTO: 383 K/UL (ref 150–400)
PMV BLD AUTO: 8.5 FL (ref 8.9–12.9)
POTASSIUM SERPL-SCNC: 4.6 MMOL/L (ref 3.5–5.1)
PROT SERPL-MCNC: 8.3 G/DL (ref 6.4–8.2)
RBC # BLD AUTO: 3.52 M/UL (ref 3.8–5.2)
SERVICE CMNT-IMP: ABNORMAL
SERVICE CMNT-IMP: NORMAL
SODIUM SERPL-SCNC: 126 MMOL/L (ref 136–145)
WBC # BLD AUTO: 7 K/UL (ref 3.6–11)

## 2024-04-11 PROCEDURE — 6360000002 HC RX W HCPCS: Performed by: INTERNAL MEDICINE

## 2024-04-11 PROCEDURE — 80053 COMPREHEN METABOLIC PANEL: CPT

## 2024-04-11 PROCEDURE — 82962 GLUCOSE BLOOD TEST: CPT

## 2024-04-11 PROCEDURE — 6370000000 HC RX 637 (ALT 250 FOR IP): Performed by: INTERNAL MEDICINE

## 2024-04-11 PROCEDURE — 2580000003 HC RX 258: Performed by: INTERNAL MEDICINE

## 2024-04-11 PROCEDURE — 2060000000 HC ICU INTERMEDIATE R&B

## 2024-04-11 PROCEDURE — 6370000000 HC RX 637 (ALT 250 FOR IP): Performed by: STUDENT IN AN ORGANIZED HEALTH CARE EDUCATION/TRAINING PROGRAM

## 2024-04-11 PROCEDURE — 36415 COLL VENOUS BLD VENIPUNCTURE: CPT

## 2024-04-11 PROCEDURE — 85025 COMPLETE CBC W/AUTO DIFF WBC: CPT

## 2024-04-11 RX ADMIN — LEVOTHYROXINE SODIUM 75 MCG: 0.07 TABLET ORAL at 05:50

## 2024-04-11 RX ADMIN — SODIUM CHLORIDE, PRESERVATIVE FREE 10 ML: 5 INJECTION INTRAVENOUS at 20:28

## 2024-04-11 RX ADMIN — APIXABAN 5 MG: 5 TABLET, FILM COATED ORAL at 09:01

## 2024-04-11 RX ADMIN — MICONAZOLE NITRATE: 20 CREAM TOPICAL at 09:01

## 2024-04-11 RX ADMIN — GABAPENTIN 300 MG: 300 CAPSULE ORAL at 09:01

## 2024-04-11 RX ADMIN — ARIPIPRAZOLE 10 MG: 5 TABLET ORAL at 09:01

## 2024-04-11 RX ADMIN — VENLAFAXINE HYDROCHLORIDE 75 MG: 37.5 CAPSULE, EXTENDED RELEASE ORAL at 09:00

## 2024-04-11 RX ADMIN — Medication 400 MG: at 09:01

## 2024-04-11 RX ADMIN — ONDANSETRON 4 MG: 2 INJECTION INTRAMUSCULAR; INTRAVENOUS at 11:27

## 2024-04-11 RX ADMIN — OXCARBAZEPINE 300 MG: 300 TABLET, FILM COATED ORAL at 09:01

## 2024-04-11 RX ADMIN — SODIUM CHLORIDE, PRESERVATIVE FREE 10 ML: 5 INJECTION INTRAVENOUS at 08:59

## 2024-04-11 RX ADMIN — ATENOLOL 50 MG: 25 TABLET ORAL at 09:00

## 2024-04-11 RX ADMIN — GABAPENTIN 300 MG: 300 CAPSULE ORAL at 13:31

## 2024-04-11 RX ADMIN — OXCARBAZEPINE 300 MG: 300 TABLET, FILM COATED ORAL at 20:33

## 2024-04-11 RX ADMIN — MICONAZOLE NITRATE: 20 CREAM TOPICAL at 20:28

## 2024-04-11 RX ADMIN — APIXABAN 5 MG: 5 TABLET, FILM COATED ORAL at 20:33

## 2024-04-11 RX ADMIN — Medication 400 MG: at 20:33

## 2024-04-11 RX ADMIN — OXYBUTYNIN CHLORIDE 5 MG: 5 TABLET, EXTENDED RELEASE ORAL at 09:01

## 2024-04-11 RX ADMIN — GABAPENTIN 300 MG: 300 CAPSULE ORAL at 20:33

## 2024-04-11 RX ADMIN — FLUCONAZOLE 150 MG: 100 TABLET ORAL at 09:01

## 2024-04-11 ASSESSMENT — PAIN SCALES - GENERAL: PAINLEVEL_OUTOF10: 0

## 2024-04-11 NOTE — CARE COORDINATION
Transition of Care Plan:    RUR: 12%   Prior Level of Functioning: independent  Disposition: home with significant other  If SNF or IPR: Date FOC offered:   Date FOC received:   Accepting facility:   Date authorization started with reference number:   Date authorization received and expires:   Follow up appointments: PCP, Specialists  DME needed: Pt owns a cane.   Transportation at discharge: family  IM/IMM Medicare/ letter given: needed at d/c  Is patient a [place] and connected with VA?    If yes, was [place] transfer form completed and VA notified?   Caregiver Contact: Jordy Barahona (Child)  [phone] (Mobile)   Discharge Caregiver contacted prior to discharge?   Care Conference needed?   Barriers to discharge: sodium, cardiology & nephrology clearance    CM attended IDRs.  Pt's PACHECO is 4/13/24.  CM will continue to follow for d/c needs.     Mary Sinclair LMSW  Supervisee in Social Work  Care Management, [facility]  [phone]

## 2024-04-12 LAB
ALBUMIN SERPL-MCNC: 3.4 G/DL (ref 3.5–5)
ALBUMIN/GLOB SERPL: 0.7 (ref 1.1–2.2)
ALP SERPL-CCNC: 114 U/L (ref 45–117)
ALT SERPL-CCNC: 23 U/L (ref 12–78)
ANION GAP SERPL CALC-SCNC: 3 MMOL/L (ref 5–15)
AST SERPL-CCNC: 32 U/L (ref 15–37)
BASOPHILS # BLD: 0 K/UL (ref 0–0.1)
BASOPHILS NFR BLD: 0 % (ref 0–1)
BILIRUB SERPL-MCNC: 1 MG/DL (ref 0.2–1)
BUN SERPL-MCNC: 26 MG/DL (ref 6–20)
BUN/CREAT SERPL: 25 (ref 12–20)
CALCIUM SERPL-MCNC: 9.5 MG/DL (ref 8.5–10.1)
CHLORIDE SERPL-SCNC: 97 MMOL/L (ref 97–108)
CO2 SERPL-SCNC: 27 MMOL/L (ref 21–32)
CREAT SERPL-MCNC: 1.05 MG/DL (ref 0.55–1.02)
DIFFERENTIAL METHOD BLD: ABNORMAL
EOSINOPHIL # BLD: 0.3 K/UL (ref 0–0.4)
EOSINOPHIL NFR BLD: 5 % (ref 0–7)
ERYTHROCYTE [DISTWIDTH] IN BLOOD BY AUTOMATED COUNT: 14.7 % (ref 11.5–14.5)
GLOBULIN SER CALC-MCNC: 5 G/DL (ref 2–4)
GLUCOSE BLD STRIP.AUTO-MCNC: 116 MG/DL (ref 65–117)
GLUCOSE BLD STRIP.AUTO-MCNC: 117 MG/DL (ref 65–117)
GLUCOSE BLD STRIP.AUTO-MCNC: 118 MG/DL (ref 65–117)
GLUCOSE BLD STRIP.AUTO-MCNC: 157 MG/DL (ref 65–117)
GLUCOSE SERPL-MCNC: 122 MG/DL (ref 65–100)
HCT VFR BLD AUTO: 34 % (ref 35–47)
HGB BLD-MCNC: 10.8 G/DL (ref 11.5–16)
IMM GRANULOCYTES # BLD AUTO: 0 K/UL (ref 0–0.04)
IMM GRANULOCYTES NFR BLD AUTO: 0 % (ref 0–0.5)
LYMPHOCYTES # BLD: 1.1 K/UL (ref 0.8–3.5)
LYMPHOCYTES NFR BLD: 18 % (ref 12–49)
MCH RBC QN AUTO: 30.6 PG (ref 26–34)
MCHC RBC AUTO-ENTMCNC: 31.8 G/DL (ref 30–36.5)
MCV RBC AUTO: 96.3 FL (ref 80–99)
MONOCYTES # BLD: 1 K/UL (ref 0–1)
MONOCYTES NFR BLD: 17 % (ref 5–13)
NEUTS SEG # BLD: 3.7 K/UL (ref 1.8–8)
NEUTS SEG NFR BLD: 61 % (ref 32–75)
NRBC # BLD: 0 K/UL (ref 0–0.01)
NRBC BLD-RTO: 0 PER 100 WBC
PLATELET # BLD AUTO: 345 K/UL (ref 150–400)
PMV BLD AUTO: 8.6 FL (ref 8.9–12.9)
POTASSIUM SERPL-SCNC: 4.7 MMOL/L (ref 3.5–5.1)
PROT SERPL-MCNC: 8.4 G/DL (ref 6.4–8.2)
RBC # BLD AUTO: 3.53 M/UL (ref 3.8–5.2)
SERVICE CMNT-IMP: ABNORMAL
SERVICE CMNT-IMP: ABNORMAL
SERVICE CMNT-IMP: NORMAL
SERVICE CMNT-IMP: NORMAL
SODIUM SERPL-SCNC: 127 MMOL/L (ref 136–145)
WBC # BLD AUTO: 6.2 K/UL (ref 3.6–11)

## 2024-04-12 PROCEDURE — 36415 COLL VENOUS BLD VENIPUNCTURE: CPT

## 2024-04-12 PROCEDURE — 2060000000 HC ICU INTERMEDIATE R&B

## 2024-04-12 PROCEDURE — 6370000000 HC RX 637 (ALT 250 FOR IP): Performed by: INTERNAL MEDICINE

## 2024-04-12 PROCEDURE — 85025 COMPLETE CBC W/AUTO DIFF WBC: CPT

## 2024-04-12 PROCEDURE — 2580000003 HC RX 258: Performed by: INTERNAL MEDICINE

## 2024-04-12 PROCEDURE — 80053 COMPREHEN METABOLIC PANEL: CPT

## 2024-04-12 PROCEDURE — 82962 GLUCOSE BLOOD TEST: CPT

## 2024-04-12 PROCEDURE — 6370000000 HC RX 637 (ALT 250 FOR IP): Performed by: STUDENT IN AN ORGANIZED HEALTH CARE EDUCATION/TRAINING PROGRAM

## 2024-04-12 RX ORDER — 0.9 % SODIUM CHLORIDE 0.9 %
500 INTRAVENOUS SOLUTION INTRAVENOUS ONCE
Status: COMPLETED | OUTPATIENT
Start: 2024-04-12 | End: 2024-04-12

## 2024-04-12 RX ADMIN — FLUCONAZOLE 150 MG: 100 TABLET ORAL at 09:06

## 2024-04-12 RX ADMIN — SODIUM CHLORIDE 500 ML: 9 INJECTION, SOLUTION INTRAVENOUS at 09:29

## 2024-04-12 RX ADMIN — ARIPIPRAZOLE 10 MG: 5 TABLET ORAL at 09:08

## 2024-04-12 RX ADMIN — GABAPENTIN 300 MG: 300 CAPSULE ORAL at 21:18

## 2024-04-12 RX ADMIN — SODIUM CHLORIDE, PRESERVATIVE FREE 10 ML: 5 INJECTION INTRAVENOUS at 09:18

## 2024-04-12 RX ADMIN — SODIUM CHLORIDE, PRESERVATIVE FREE 10 ML: 5 INJECTION INTRAVENOUS at 21:18

## 2024-04-12 RX ADMIN — VENLAFAXINE HYDROCHLORIDE 75 MG: 37.5 CAPSULE, EXTENDED RELEASE ORAL at 09:08

## 2024-04-12 RX ADMIN — MICONAZOLE NITRATE: 20 CREAM TOPICAL at 09:09

## 2024-04-12 RX ADMIN — APIXABAN 5 MG: 5 TABLET, FILM COATED ORAL at 21:18

## 2024-04-12 RX ADMIN — GABAPENTIN 300 MG: 300 CAPSULE ORAL at 09:08

## 2024-04-12 RX ADMIN — LEVOTHYROXINE SODIUM 75 MCG: 0.07 TABLET ORAL at 06:46

## 2024-04-12 RX ADMIN — Medication 400 MG: at 21:18

## 2024-04-12 RX ADMIN — SODIUM CHLORIDE 500 ML: 9 INJECTION, SOLUTION INTRAVENOUS at 12:12

## 2024-04-12 RX ADMIN — APIXABAN 5 MG: 5 TABLET, FILM COATED ORAL at 12:13

## 2024-04-12 RX ADMIN — OXCARBAZEPINE 300 MG: 300 TABLET, FILM COATED ORAL at 21:18

## 2024-04-12 RX ADMIN — MICONAZOLE NITRATE: 20 CREAM TOPICAL at 21:19

## 2024-04-12 RX ADMIN — Medication 400 MG: at 09:08

## 2024-04-12 RX ADMIN — OXYBUTYNIN CHLORIDE 5 MG: 5 TABLET, EXTENDED RELEASE ORAL at 09:04

## 2024-04-12 RX ADMIN — GABAPENTIN 300 MG: 300 CAPSULE ORAL at 15:37

## 2024-04-12 RX ADMIN — OXCARBAZEPINE 300 MG: 300 TABLET, FILM COATED ORAL at 09:05

## 2024-04-12 ASSESSMENT — PAIN SCALES - GENERAL
PAINLEVEL_OUTOF10: 0

## 2024-04-13 LAB
ALBUMIN SERPL-MCNC: 3.1 G/DL (ref 3.5–5)
ALBUMIN/GLOB SERPL: 0.6 (ref 1.1–2.2)
ALP SERPL-CCNC: 118 U/L (ref 45–117)
ALT SERPL-CCNC: 24 U/L (ref 12–78)
ANION GAP SERPL CALC-SCNC: 3 MMOL/L (ref 5–15)
APPEARANCE UR: CLEAR
AST SERPL-CCNC: 34 U/L (ref 15–37)
BACTERIA URNS QL MICRO: NEGATIVE /HPF
BASOPHILS # BLD: 0 K/UL (ref 0–0.1)
BASOPHILS NFR BLD: 1 % (ref 0–1)
BILIRUB SERPL-MCNC: 0.6 MG/DL (ref 0.2–1)
BILIRUB UR QL: NEGATIVE
BUN SERPL-MCNC: 26 MG/DL (ref 6–20)
BUN/CREAT SERPL: 30 (ref 12–20)
CALCIUM SERPL-MCNC: 9.1 MG/DL (ref 8.5–10.1)
CHLORIDE SERPL-SCNC: 99 MMOL/L (ref 97–108)
CO2 SERPL-SCNC: 25 MMOL/L (ref 21–32)
COLOR UR: ABNORMAL
CREAT SERPL-MCNC: 0.87 MG/DL (ref 0.55–1.02)
DIFFERENTIAL METHOD BLD: ABNORMAL
EOSINOPHIL # BLD: 0.4 K/UL (ref 0–0.4)
EOSINOPHIL NFR BLD: 7 % (ref 0–7)
EPITH CASTS URNS QL MICRO: ABNORMAL /LPF
ERYTHROCYTE [DISTWIDTH] IN BLOOD BY AUTOMATED COUNT: 14.6 % (ref 11.5–14.5)
GLOBULIN SER CALC-MCNC: 4.8 G/DL (ref 2–4)
GLUCOSE BLD STRIP.AUTO-MCNC: 106 MG/DL (ref 65–117)
GLUCOSE BLD STRIP.AUTO-MCNC: 113 MG/DL (ref 65–117)
GLUCOSE BLD STRIP.AUTO-MCNC: 116 MG/DL (ref 65–117)
GLUCOSE BLD STRIP.AUTO-MCNC: 117 MG/DL (ref 65–117)
GLUCOSE SERPL-MCNC: 110 MG/DL (ref 65–100)
GLUCOSE UR STRIP.AUTO-MCNC: NEGATIVE MG/DL
HCT VFR BLD AUTO: 30.4 % (ref 35–47)
HGB BLD-MCNC: 9.6 G/DL (ref 11.5–16)
HGB UR QL STRIP: NEGATIVE
HYALINE CASTS URNS QL MICRO: ABNORMAL /LPF (ref 0–2)
IMM GRANULOCYTES # BLD AUTO: 0 K/UL (ref 0–0.04)
IMM GRANULOCYTES NFR BLD AUTO: 1 % (ref 0–0.5)
KETONES UR QL STRIP.AUTO: NEGATIVE MG/DL
LEUKOCYTE ESTERASE UR QL STRIP.AUTO: ABNORMAL
LYMPHOCYTES # BLD: 1.1 K/UL (ref 0.8–3.5)
LYMPHOCYTES NFR BLD: 18 % (ref 12–49)
MCH RBC QN AUTO: 30.2 PG (ref 26–34)
MCHC RBC AUTO-ENTMCNC: 31.6 G/DL (ref 30–36.5)
MCV RBC AUTO: 95.6 FL (ref 80–99)
MONOCYTES # BLD: 0.7 K/UL (ref 0–1)
MONOCYTES NFR BLD: 11 % (ref 5–13)
NEUTS SEG # BLD: 3.8 K/UL (ref 1.8–8)
NEUTS SEG NFR BLD: 62 % (ref 32–75)
NITRITE UR QL STRIP.AUTO: NEGATIVE
NRBC # BLD: 0 K/UL (ref 0–0.01)
NRBC BLD-RTO: 0 PER 100 WBC
PH UR STRIP: 5.5 (ref 5–8)
PLATELET # BLD AUTO: 358 K/UL (ref 150–400)
PMV BLD AUTO: 9 FL (ref 8.9–12.9)
POTASSIUM SERPL-SCNC: 4.5 MMOL/L (ref 3.5–5.1)
PROT SERPL-MCNC: 7.9 G/DL (ref 6.4–8.2)
PROT UR STRIP-MCNC: NEGATIVE MG/DL
RBC # BLD AUTO: 3.18 M/UL (ref 3.8–5.2)
RBC #/AREA URNS HPF: ABNORMAL /HPF (ref 0–5)
SERVICE CMNT-IMP: NORMAL
SODIUM SERPL-SCNC: 127 MMOL/L (ref 136–145)
SP GR UR REFRACTOMETRY: 1.02
URINE CULTURE IF INDICATED: ABNORMAL
UROBILINOGEN UR QL STRIP.AUTO: 1 EU/DL (ref 0.2–1)
WBC # BLD AUTO: 6.1 K/UL (ref 3.6–11)
WBC URNS QL MICRO: ABNORMAL /HPF (ref 0–4)

## 2024-04-13 PROCEDURE — 6370000000 HC RX 637 (ALT 250 FOR IP): Performed by: NURSE PRACTITIONER

## 2024-04-13 PROCEDURE — 82962 GLUCOSE BLOOD TEST: CPT

## 2024-04-13 PROCEDURE — 85025 COMPLETE CBC W/AUTO DIFF WBC: CPT

## 2024-04-13 PROCEDURE — 2580000003 HC RX 258: Performed by: INTERNAL MEDICINE

## 2024-04-13 PROCEDURE — 87086 URINE CULTURE/COLONY COUNT: CPT

## 2024-04-13 PROCEDURE — 6370000000 HC RX 637 (ALT 250 FOR IP): Performed by: INTERNAL MEDICINE

## 2024-04-13 PROCEDURE — 81001 URINALYSIS AUTO W/SCOPE: CPT

## 2024-04-13 PROCEDURE — 80053 COMPREHEN METABOLIC PANEL: CPT

## 2024-04-13 PROCEDURE — 2060000000 HC ICU INTERMEDIATE R&B

## 2024-04-13 PROCEDURE — 6370000000 HC RX 637 (ALT 250 FOR IP): Performed by: STUDENT IN AN ORGANIZED HEALTH CARE EDUCATION/TRAINING PROGRAM

## 2024-04-13 PROCEDURE — 36415 COLL VENOUS BLD VENIPUNCTURE: CPT

## 2024-04-13 RX ORDER — HYDROXYZINE HYDROCHLORIDE 25 MG/1
25 TABLET, FILM COATED ORAL EVERY 6 HOURS PRN
Status: DISCONTINUED | OUTPATIENT
Start: 2024-04-13 | End: 2024-04-16 | Stop reason: HOSPADM

## 2024-04-13 RX ORDER — AMIODARONE HYDROCHLORIDE 200 MG/1
200 TABLET ORAL 2 TIMES DAILY
Status: DISCONTINUED | OUTPATIENT
Start: 2024-04-13 | End: 2024-04-16

## 2024-04-13 RX ORDER — TRIAMCINOLONE ACETONIDE 1 MG/G
CREAM TOPICAL 2 TIMES DAILY PRN
Status: DISCONTINUED | OUTPATIENT
Start: 2024-04-13 | End: 2024-04-16 | Stop reason: HOSPADM

## 2024-04-13 RX ADMIN — GABAPENTIN 300 MG: 300 CAPSULE ORAL at 21:52

## 2024-04-13 RX ADMIN — HYDROXYZINE HYDROCHLORIDE 25 MG: 25 TABLET, FILM COATED ORAL at 10:05

## 2024-04-13 RX ADMIN — AMIODARONE HYDROCHLORIDE 200 MG: 200 TABLET ORAL at 14:01

## 2024-04-13 RX ADMIN — Medication 400 MG: at 10:15

## 2024-04-13 RX ADMIN — OXCARBAZEPINE 300 MG: 300 TABLET, FILM COATED ORAL at 21:55

## 2024-04-13 RX ADMIN — APIXABAN 5 MG: 5 TABLET, FILM COATED ORAL at 10:05

## 2024-04-13 RX ADMIN — ARIPIPRAZOLE 10 MG: 5 TABLET ORAL at 10:05

## 2024-04-13 RX ADMIN — LEVOTHYROXINE SODIUM 75 MCG: 0.07 TABLET ORAL at 07:05

## 2024-04-13 RX ADMIN — VENLAFAXINE HYDROCHLORIDE 75 MG: 37.5 CAPSULE, EXTENDED RELEASE ORAL at 10:05

## 2024-04-13 RX ADMIN — OXCARBAZEPINE 300 MG: 300 TABLET, FILM COATED ORAL at 10:05

## 2024-04-13 RX ADMIN — AMIODARONE HYDROCHLORIDE 200 MG: 200 TABLET ORAL at 21:52

## 2024-04-13 RX ADMIN — METOPROLOL TARTRATE 12.5 MG: 25 TABLET, FILM COATED ORAL at 10:05

## 2024-04-13 RX ADMIN — Medication 400 MG: at 21:52

## 2024-04-13 RX ADMIN — MICONAZOLE NITRATE: 20 CREAM TOPICAL at 10:09

## 2024-04-13 RX ADMIN — OXYBUTYNIN CHLORIDE 5 MG: 5 TABLET, EXTENDED RELEASE ORAL at 10:05

## 2024-04-13 RX ADMIN — MICONAZOLE NITRATE: 20 CREAM TOPICAL at 21:57

## 2024-04-13 RX ADMIN — GABAPENTIN 300 MG: 300 CAPSULE ORAL at 10:05

## 2024-04-13 RX ADMIN — GABAPENTIN 300 MG: 300 CAPSULE ORAL at 14:01

## 2024-04-13 RX ADMIN — METOPROLOL TARTRATE 12.5 MG: 25 TABLET, FILM COATED ORAL at 21:52

## 2024-04-13 RX ADMIN — SODIUM CHLORIDE, PRESERVATIVE FREE 10 ML: 5 INJECTION INTRAVENOUS at 21:56

## 2024-04-13 RX ADMIN — APIXABAN 5 MG: 5 TABLET, FILM COATED ORAL at 21:51

## 2024-04-13 RX ADMIN — SODIUM CHLORIDE, PRESERVATIVE FREE 10 ML: 5 INJECTION INTRAVENOUS at 10:08

## 2024-04-13 ASSESSMENT — PAIN SCALES - GENERAL
PAINLEVEL_OUTOF10: 0

## 2024-04-13 NOTE — CONSULTS
[facility]  [address] [place] [address]  [phone]  [email or web address]       CARDIOLOGY CONSULTATION       Date of  Admission: 4/9/2024 12:55 PM     Admission type:Emergency   Primary Care Physician:Zaid Conti DO     Attending Provider: Mars Jarvis MD  Cardiology Provider:     PLEASE NOTE THAT WE CONFIRMED WITH THE REFERRING PHYSICIAN PRIOR TO SEEING THE PATIENT THAT THE PATIENT IS BEING REFERRED FOR INITIAL HOSPITAL EVALUATION AND FOR LONG-TERM ONGOING CARDIAC CARE    CC/REASON FOR CONSULT: AF RVR Re consult     Subjective:     Darshana Kraus is a 73 y.o. female admitted for Hyponatremia [E87.1]  A-fib ([facility]) [I48.91]  Atrial fibrillation with RVR ([facility]) [I48.91].       The patient was seen and examined at the bedside.  She reports a complaint of feeling tired.  She was sent from cardiology office for low sodium level.  Denies any chest pain.  Although she does have longstanding shortness of breath even at rest.     Was taken on atenolol and started on metoprolol for rate control. SBPs  soft.   Patient Active Problem List    Diagnosis Date Noted    A-fib ([facility]) 04/09/2024    PAF (paroxysmal atrial fibrillation) ([facility]) 09/08/2022    Primary hypertension 09/08/2022    Dilated cardiomyopathy (HCC) 09/08/2022    Bipolar 1 disorder ([facility]) 09/08/2022    Hypoxia 07/16/2020      Zaid Conti DO  Past Medical History:   Diagnosis Date    Asthma     Atrial fibrillation (HCC)     Bipolar 1 disorder (HCC)     Essential hypertension     Pneumonia       Social History     Socioeconomic History    Marital status:    Tobacco Use    Smoking status: Never    Smokeless tobacco: Never   Substance and Sexual Activity    Alcohol use: Yes     Comment: occ    Drug use: Never     Social Determinants of Health     Food Insecurity: No Food Insecurity (4/9/2024)    Hunger Vital Sign     Worried About Running Out of Food in the Last Year: Never true     Ran Out of

## 2024-04-14 LAB
ALBUMIN SERPL-MCNC: 3.3 G/DL (ref 3.5–5)
ANION GAP SERPL CALC-SCNC: 2 MMOL/L (ref 5–15)
BASOPHILS # BLD: 0 K/UL (ref 0–0.1)
BASOPHILS NFR BLD: 1 % (ref 0–1)
BUN SERPL-MCNC: 23 MG/DL (ref 6–20)
BUN/CREAT SERPL: 27 (ref 12–20)
CALCIUM SERPL-MCNC: 9.5 MG/DL (ref 8.5–10.1)
CHLORIDE SERPL-SCNC: 99 MMOL/L (ref 97–108)
CO2 SERPL-SCNC: 29 MMOL/L (ref 21–32)
CREAT SERPL-MCNC: 0.86 MG/DL (ref 0.55–1.02)
DIFFERENTIAL METHOD BLD: ABNORMAL
EOSINOPHIL # BLD: 0.4 K/UL (ref 0–0.4)
EOSINOPHIL NFR BLD: 6 % (ref 0–7)
ERYTHROCYTE [DISTWIDTH] IN BLOOD BY AUTOMATED COUNT: 14.6 % (ref 11.5–14.5)
GLUCOSE BLD STRIP.AUTO-MCNC: 111 MG/DL (ref 65–117)
GLUCOSE BLD STRIP.AUTO-MCNC: 119 MG/DL (ref 65–117)
GLUCOSE BLD STRIP.AUTO-MCNC: 119 MG/DL (ref 65–117)
GLUCOSE BLD STRIP.AUTO-MCNC: 173 MG/DL (ref 65–117)
GLUCOSE SERPL-MCNC: 113 MG/DL (ref 65–100)
HCT VFR BLD AUTO: 31.2 % (ref 35–47)
HGB BLD-MCNC: 10.1 G/DL (ref 11.5–16)
IMM GRANULOCYTES # BLD AUTO: 0 K/UL (ref 0–0.04)
IMM GRANULOCYTES NFR BLD AUTO: 0 % (ref 0–0.5)
LYMPHOCYTES # BLD: 1.3 K/UL (ref 0.8–3.5)
LYMPHOCYTES NFR BLD: 22 % (ref 12–49)
MCH RBC QN AUTO: 30.7 PG (ref 26–34)
MCHC RBC AUTO-ENTMCNC: 32.4 G/DL (ref 30–36.5)
MCV RBC AUTO: 94.8 FL (ref 80–99)
MONOCYTES # BLD: 0.6 K/UL (ref 0–1)
MONOCYTES NFR BLD: 10 % (ref 5–13)
NEUTS SEG # BLD: 3.4 K/UL (ref 1.8–8)
NEUTS SEG NFR BLD: 61 % (ref 32–75)
NRBC # BLD: 0 K/UL (ref 0–0.01)
NRBC BLD-RTO: 0 PER 100 WBC
PHOSPHATE SERPL-MCNC: 4.3 MG/DL (ref 2.6–4.7)
PLATELET # BLD AUTO: 384 K/UL (ref 150–400)
PMV BLD AUTO: 8.9 FL (ref 8.9–12.9)
POTASSIUM SERPL-SCNC: 4.5 MMOL/L (ref 3.5–5.1)
RBC # BLD AUTO: 3.29 M/UL (ref 3.8–5.2)
SERVICE CMNT-IMP: ABNORMAL
SERVICE CMNT-IMP: NORMAL
SODIUM SERPL-SCNC: 130 MMOL/L (ref 136–145)
WBC # BLD AUTO: 5.7 K/UL (ref 3.6–11)

## 2024-04-14 PROCEDURE — 6370000000 HC RX 637 (ALT 250 FOR IP): Performed by: NURSE PRACTITIONER

## 2024-04-14 PROCEDURE — 2060000000 HC ICU INTERMEDIATE R&B

## 2024-04-14 PROCEDURE — 6370000000 HC RX 637 (ALT 250 FOR IP): Performed by: INTERNAL MEDICINE

## 2024-04-14 PROCEDURE — 80069 RENAL FUNCTION PANEL: CPT

## 2024-04-14 PROCEDURE — 82962 GLUCOSE BLOOD TEST: CPT

## 2024-04-14 PROCEDURE — 85025 COMPLETE CBC W/AUTO DIFF WBC: CPT

## 2024-04-14 PROCEDURE — 6370000000 HC RX 637 (ALT 250 FOR IP): Performed by: STUDENT IN AN ORGANIZED HEALTH CARE EDUCATION/TRAINING PROGRAM

## 2024-04-14 PROCEDURE — 2580000003 HC RX 258: Performed by: INTERNAL MEDICINE

## 2024-04-14 PROCEDURE — 36415 COLL VENOUS BLD VENIPUNCTURE: CPT

## 2024-04-14 PROCEDURE — 6360000002 HC RX W HCPCS: Performed by: INTERNAL MEDICINE

## 2024-04-14 RX ADMIN — Medication 400 MG: at 19:49

## 2024-04-14 RX ADMIN — OXYBUTYNIN CHLORIDE 5 MG: 5 TABLET, EXTENDED RELEASE ORAL at 09:16

## 2024-04-14 RX ADMIN — SODIUM CHLORIDE, PRESERVATIVE FREE 10 ML: 5 INJECTION INTRAVENOUS at 21:29

## 2024-04-14 RX ADMIN — OXCARBAZEPINE 300 MG: 300 TABLET, FILM COATED ORAL at 19:49

## 2024-04-14 RX ADMIN — AMIODARONE HYDROCHLORIDE 200 MG: 200 TABLET ORAL at 19:49

## 2024-04-14 RX ADMIN — METOPROLOL TARTRATE 12.5 MG: 25 TABLET, FILM COATED ORAL at 09:16

## 2024-04-14 RX ADMIN — METOPROLOL TARTRATE 25 MG: 25 TABLET, FILM COATED ORAL at 19:49

## 2024-04-14 RX ADMIN — MICONAZOLE NITRATE: 20 CREAM TOPICAL at 09:21

## 2024-04-14 RX ADMIN — VENLAFAXINE HYDROCHLORIDE 75 MG: 37.5 CAPSULE, EXTENDED RELEASE ORAL at 09:16

## 2024-04-14 RX ADMIN — SODIUM CHLORIDE: 9 INJECTION, SOLUTION INTRAVENOUS at 13:38

## 2024-04-14 RX ADMIN — GABAPENTIN 300 MG: 300 CAPSULE ORAL at 13:19

## 2024-04-14 RX ADMIN — LEVOTHYROXINE SODIUM 75 MCG: 0.07 TABLET ORAL at 06:45

## 2024-04-14 RX ADMIN — OXCARBAZEPINE 300 MG: 300 TABLET, FILM COATED ORAL at 09:16

## 2024-04-14 RX ADMIN — SODIUM CHLORIDE 1000 MG: 900 INJECTION INTRAVENOUS at 13:42

## 2024-04-14 RX ADMIN — SODIUM CHLORIDE, PRESERVATIVE FREE 10 ML: 5 INJECTION INTRAVENOUS at 09:20

## 2024-04-14 RX ADMIN — AMIODARONE HYDROCHLORIDE 200 MG: 200 TABLET ORAL at 09:16

## 2024-04-14 RX ADMIN — Medication 400 MG: at 09:16

## 2024-04-14 RX ADMIN — APIXABAN 5 MG: 5 TABLET, FILM COATED ORAL at 19:49

## 2024-04-14 RX ADMIN — MICONAZOLE NITRATE: 20 CREAM TOPICAL at 21:28

## 2024-04-14 RX ADMIN — GABAPENTIN 300 MG: 300 CAPSULE ORAL at 21:28

## 2024-04-14 RX ADMIN — APIXABAN 5 MG: 5 TABLET, FILM COATED ORAL at 09:16

## 2024-04-14 RX ADMIN — ARIPIPRAZOLE 10 MG: 5 TABLET ORAL at 09:16

## 2024-04-14 RX ADMIN — GABAPENTIN 300 MG: 300 CAPSULE ORAL at 09:16

## 2024-04-14 ASSESSMENT — PAIN SCALES - GENERAL
PAINLEVEL_OUTOF10: 0

## 2024-04-15 LAB
ALBUMIN SERPL-MCNC: 3.4 G/DL (ref 3.5–5)
ALBUMIN/GLOB SERPL: 0.7 (ref 1.1–2.2)
ALP SERPL-CCNC: 128 U/L (ref 45–117)
ALT SERPL-CCNC: 26 U/L (ref 12–78)
ANION GAP SERPL CALC-SCNC: 4 MMOL/L (ref 5–15)
AST SERPL-CCNC: 42 U/L (ref 15–37)
BACTERIA SPEC CULT: NORMAL
BILIRUB SERPL-MCNC: 0.4 MG/DL (ref 0.2–1)
BUN SERPL-MCNC: 25 MG/DL (ref 6–20)
BUN/CREAT SERPL: 28 (ref 12–20)
CALCIUM SERPL-MCNC: 9.2 MG/DL (ref 8.5–10.1)
CC UR VC: NORMAL
CHLORIDE SERPL-SCNC: 100 MMOL/L (ref 97–108)
CO2 SERPL-SCNC: 27 MMOL/L (ref 21–32)
CREAT SERPL-MCNC: 0.9 MG/DL (ref 0.55–1.02)
ERYTHROCYTE [DISTWIDTH] IN BLOOD BY AUTOMATED COUNT: 14.5 % (ref 11.5–14.5)
GLOBULIN SER CALC-MCNC: 4.9 G/DL (ref 2–4)
GLUCOSE BLD STRIP.AUTO-MCNC: 104 MG/DL (ref 65–117)
GLUCOSE BLD STRIP.AUTO-MCNC: 111 MG/DL (ref 65–117)
GLUCOSE BLD STRIP.AUTO-MCNC: 118 MG/DL (ref 65–117)
GLUCOSE BLD STRIP.AUTO-MCNC: 138 MG/DL (ref 65–117)
GLUCOSE SERPL-MCNC: 123 MG/DL (ref 65–100)
HCT VFR BLD AUTO: 30.9 % (ref 35–47)
HGB BLD-MCNC: 9.9 G/DL (ref 11.5–16)
MAGNESIUM SERPL-MCNC: 1.9 MG/DL (ref 1.6–2.4)
MCH RBC QN AUTO: 30.3 PG (ref 26–34)
MCHC RBC AUTO-ENTMCNC: 32 G/DL (ref 30–36.5)
MCV RBC AUTO: 94.5 FL (ref 80–99)
NRBC # BLD: 0 K/UL (ref 0–0.01)
NRBC BLD-RTO: 0 PER 100 WBC
PHOSPHATE SERPL-MCNC: 4.5 MG/DL (ref 2.6–4.7)
PLATELET # BLD AUTO: 354 K/UL (ref 150–400)
PMV BLD AUTO: 8.6 FL (ref 8.9–12.9)
POTASSIUM SERPL-SCNC: 4.3 MMOL/L (ref 3.5–5.1)
PROT SERPL-MCNC: 8.3 G/DL (ref 6.4–8.2)
RBC # BLD AUTO: 3.27 M/UL (ref 3.8–5.2)
SERVICE CMNT-IMP: ABNORMAL
SERVICE CMNT-IMP: ABNORMAL
SERVICE CMNT-IMP: NORMAL
SODIUM SERPL-SCNC: 131 MMOL/L (ref 136–145)
WBC # BLD AUTO: 5.7 K/UL (ref 3.6–11)

## 2024-04-15 PROCEDURE — 83735 ASSAY OF MAGNESIUM: CPT

## 2024-04-15 PROCEDURE — 85027 COMPLETE CBC AUTOMATED: CPT

## 2024-04-15 PROCEDURE — 97116 GAIT TRAINING THERAPY: CPT

## 2024-04-15 PROCEDURE — 80053 COMPREHEN METABOLIC PANEL: CPT

## 2024-04-15 PROCEDURE — 2060000000 HC ICU INTERMEDIATE R&B

## 2024-04-15 PROCEDURE — 6370000000 HC RX 637 (ALT 250 FOR IP): Performed by: INTERNAL MEDICINE

## 2024-04-15 PROCEDURE — 2580000003 HC RX 258: Performed by: INTERNAL MEDICINE

## 2024-04-15 PROCEDURE — 6370000000 HC RX 637 (ALT 250 FOR IP): Performed by: NURSE PRACTITIONER

## 2024-04-15 PROCEDURE — 6360000002 HC RX W HCPCS: Performed by: INTERNAL MEDICINE

## 2024-04-15 PROCEDURE — 84100 ASSAY OF PHOSPHORUS: CPT

## 2024-04-15 PROCEDURE — 97161 PT EVAL LOW COMPLEX 20 MIN: CPT

## 2024-04-15 PROCEDURE — 82962 GLUCOSE BLOOD TEST: CPT

## 2024-04-15 PROCEDURE — 6370000000 HC RX 637 (ALT 250 FOR IP): Performed by: STUDENT IN AN ORGANIZED HEALTH CARE EDUCATION/TRAINING PROGRAM

## 2024-04-15 PROCEDURE — 36415 COLL VENOUS BLD VENIPUNCTURE: CPT

## 2024-04-15 RX ORDER — DIGOXIN 125 MCG
125 TABLET ORAL DAILY
Status: DISCONTINUED | OUTPATIENT
Start: 2024-04-17 | End: 2024-04-16

## 2024-04-15 RX ORDER — DIGOXIN 0.25 MG/ML
250 INJECTION INTRAMUSCULAR; INTRAVENOUS EVERY 6 HOURS
Status: DISCONTINUED | OUTPATIENT
Start: 2024-04-15 | End: 2024-04-16

## 2024-04-15 RX ADMIN — METOPROLOL TARTRATE 25 MG: 25 TABLET, FILM COATED ORAL at 09:46

## 2024-04-15 RX ADMIN — SODIUM CHLORIDE, PRESERVATIVE FREE 10 ML: 5 INJECTION INTRAVENOUS at 09:47

## 2024-04-15 RX ADMIN — AMIODARONE HYDROCHLORIDE 200 MG: 200 TABLET ORAL at 21:51

## 2024-04-15 RX ADMIN — Medication 400 MG: at 09:46

## 2024-04-15 RX ADMIN — APIXABAN 5 MG: 5 TABLET, FILM COATED ORAL at 09:46

## 2024-04-15 RX ADMIN — SODIUM CHLORIDE, PRESERVATIVE FREE 10 ML: 5 INJECTION INTRAVENOUS at 21:53

## 2024-04-15 RX ADMIN — POLYETHYLENE GLYCOL 3350 17 G: 17 POWDER, FOR SOLUTION ORAL at 09:57

## 2024-04-15 RX ADMIN — OXYBUTYNIN CHLORIDE 5 MG: 5 TABLET, EXTENDED RELEASE ORAL at 09:46

## 2024-04-15 RX ADMIN — ARIPIPRAZOLE 10 MG: 5 TABLET ORAL at 09:46

## 2024-04-15 RX ADMIN — LEVOTHYROXINE SODIUM 75 MCG: 0.07 TABLET ORAL at 09:46

## 2024-04-15 RX ADMIN — MICONAZOLE NITRATE: 20 CREAM TOPICAL at 21:55

## 2024-04-15 RX ADMIN — VENLAFAXINE HYDROCHLORIDE 75 MG: 37.5 CAPSULE, EXTENDED RELEASE ORAL at 09:46

## 2024-04-15 RX ADMIN — GABAPENTIN 300 MG: 300 CAPSULE ORAL at 09:46

## 2024-04-15 RX ADMIN — MICONAZOLE NITRATE: 20 CREAM TOPICAL at 09:59

## 2024-04-15 RX ADMIN — Medication 400 MG: at 21:51

## 2024-04-15 RX ADMIN — GABAPENTIN 300 MG: 300 CAPSULE ORAL at 21:51

## 2024-04-15 RX ADMIN — APIXABAN 5 MG: 5 TABLET, FILM COATED ORAL at 21:50

## 2024-04-15 RX ADMIN — OXCARBAZEPINE 300 MG: 300 TABLET, FILM COATED ORAL at 21:50

## 2024-04-15 RX ADMIN — METOPROLOL TARTRATE 12.5 MG: 25 TABLET, FILM COATED ORAL at 21:50

## 2024-04-15 RX ADMIN — AMIODARONE HYDROCHLORIDE 200 MG: 200 TABLET ORAL at 09:46

## 2024-04-15 RX ADMIN — SODIUM CHLORIDE 1000 MG: 900 INJECTION INTRAVENOUS at 13:30

## 2024-04-15 RX ADMIN — OXCARBAZEPINE 300 MG: 300 TABLET, FILM COATED ORAL at 09:46

## 2024-04-15 RX ADMIN — GABAPENTIN 300 MG: 300 CAPSULE ORAL at 13:25

## 2024-04-15 ASSESSMENT — PAIN SCALES - GENERAL
PAINLEVEL_OUTOF10: 0

## 2024-04-15 NOTE — CARE COORDINATION
Transition of Care Plan:     RUR: 12%   Prior Level of Functioning: independent  Disposition: home with significant other  If SNF or IPR: Date FOC offered:   Date FOC received:   Accepting facility:   Date authorization started with reference number:   Date authorization received and expires:   Follow up appointments: PCP, Specialists  DME needed: Pt owns a cane.   Transportation at discharge: family  IM/IMM Medicare/ letter given: needed at d/c  Is patient a [place] and connected with VA?               If yes, was  transfer form completed and VA notified?   Caregiver Contact: Jordy Barahona (Child)  [phone] (Mobile)   Discharge Caregiver contacted prior to discharge?   Care Conference needed?   Barriers to discharge: sodium, cardiology & nephrology clearance    Patient will be discussed in rounds. Reviewed notes.    English Katerin STRONG CM   [phone]

## 2024-04-15 NOTE — PLAN OF CARE
Problem: Physical Therapy - Adult  Goal: By Discharge: Performs mobility at highest level of function for planned discharge setting.  See evaluation for individualized goals.  Description: FUNCTIONAL STATUS PRIOR TO ADMISSION: Patient was independent and active without use of DME.    HOME SUPPORT PRIOR TO ADMISSION: The patient lived with significant other but did not require assistance.    Physical Therapy Goals  Initiated 4/15/2024  1.  Patient will move from supine to sit and sit to supine and roll side to side in bed with modified independence within 7 day(s).    2.  Patient will perform sit to stand with modified independence within 7 day(s).  3.  Patient will transfer from bed to chair and chair to bed with modified independence using the least restrictive device within 7 day(s).  4.  Patient will ambulate with modified independence for 150 feet with the least restrictive device within 7 day(s).       Outcome: Progressing   PHYSICAL THERAPY EVALUATION    Patient: Darshana Kraus (73 y.o. female)  Date: 4/15/2024  Primary Diagnosis: Hyponatremia [E87.1]  A-fib ([facility]) [I48.91]  Atrial fibrillation with RVR ([facility]) [I48.91]       Precautions:                        ASSESSMENT :   DEFICITS/IMPAIRMENTS:   The patient is limited by decreased activity tolerance, balance s/p admission on 4/0 with hyponatremia and afib with RVR.     Based on the impairments listed above pt mobilized at SBA-CGA level for functional transfers and gait training x 50 ft without AD. No LOB, but reported feeling unsteady after not walking much since admission and only using the BSC. HR 84 bpm during activity. Recommend home with family.    Recommend pt walk to and from the bathroom and hallways with RN staff to prevent further deconditioning during remaining hospital stay. (1 person Standby Assist)    Patient will benefit from skilled intervention to address the above impairments.    Functional Outcome Measure:  The patient scored 22 on the [facility]

## 2024-04-15 NOTE — PLAN OF CARE
Problem: Discharge Planning  Goal: Discharge to home or other facility with appropriate resources  4/15/2024 0056 by Valeri Puri RN  Outcome: Progressing  Flowsheets (Taken 4/14/2024 1949)  Discharge to home or other facility with appropriate resources:   Identify barriers to discharge with patient and caregiver   Arrange for needed discharge resources and transportation as appropriate   Identify discharge learning needs (meds, wound care, etc)  4/14/2024 1537 by Mary Gonzalez RN  Outcome: Progressing     Problem: Pain  Goal: Verbalizes/displays adequate comfort level or baseline comfort level  4/15/2024 0056 by Valeri Puri RN  Outcome: Progressing  Flowsheets (Taken 4/14/2024 1945)  Verbalizes/displays adequate comfort level or baseline comfort level:   Encourage patient to monitor pain and request assistance   Assess pain using appropriate pain scale   Administer analgesics based on type and severity of pain and evaluate response   Implement non-pharmacological measures as appropriate and evaluate response  4/14/2024 1537 by Mary Gonzalez RN  Outcome: Progressing     Problem: Safety - Adult  Goal: Free from fall injury  4/15/2024 0056 by Valeri Puri RN  Outcome: Progressing  Flowsheets (Taken 4/14/2024 1949)  Free From Fall Injury: Instruct family/caregiver on patient safety  4/14/2024 1537 by Mary Gonzalez RN  Outcome: Progressing     Problem: ABCDS Injury Assessment  Goal: Absence of physical injury  4/15/2024 0056 by Valeri Puri RN  Outcome: Progressing  4/14/2024 1537 by Mary Gonzalez RN  Outcome: Progressing     Problem: Skin/Tissue Integrity  Goal: Absence of new skin breakdown  Description: 1.  Monitor for areas of redness and/or skin breakdown  2.  Assess vascular access sites hourly  3.  Every 4-6 hours minimum:  Change oxygen saturation probe site  4.  Every 4-6 hours:  If on nasal continuous positive airway pressure, respiratory therapy assess nares and

## 2024-04-16 ENCOUNTER — APPOINTMENT (OUTPATIENT)
Facility: HOSPITAL | Age: 74
DRG: 644 | End: 2024-04-16
Payer: MEDICARE

## 2024-04-16 VITALS
BODY MASS INDEX: 30.87 KG/M2 | TEMPERATURE: 97.9 F | WEIGHT: 203.71 LBS | HEIGHT: 68 IN | RESPIRATION RATE: 13 BRPM | SYSTOLIC BLOOD PRESSURE: 124 MMHG | HEART RATE: 69 BPM | OXYGEN SATURATION: 95 % | DIASTOLIC BLOOD PRESSURE: 46 MMHG

## 2024-04-16 LAB
ANION GAP SERPL CALC-SCNC: 5 MMOL/L (ref 5–15)
BASOPHILS # BLD: 0 K/UL (ref 0–0.1)
BASOPHILS NFR BLD: 1 % (ref 0–1)
BUN SERPL-MCNC: 31 MG/DL (ref 6–20)
BUN/CREAT SERPL: 33 (ref 12–20)
CALCIUM SERPL-MCNC: 9.8 MG/DL (ref 8.5–10.1)
CHLORIDE SERPL-SCNC: 98 MMOL/L (ref 97–108)
CO2 SERPL-SCNC: 29 MMOL/L (ref 21–32)
CREAT SERPL-MCNC: 0.93 MG/DL (ref 0.55–1.02)
DIFFERENTIAL METHOD BLD: ABNORMAL
ECHO AO ASC DIAM: 3.4 CM
ECHO AO ASCENDING AORTA INDEX: 1.65 CM/M2
ECHO AO ROOT DIAM: 3.2 CM
ECHO AO ROOT INDEX: 1.55 CM/M2
ECHO AV AREA PEAK VELOCITY: 2.8 CM2
ECHO AV AREA/BSA PEAK VELOCITY: 1.4 CM2/M2
ECHO AV PEAK GRADIENT: 12 MMHG
ECHO AV PEAK VELOCITY: 1.7 M/S
ECHO AV VELOCITY RATIO: 0.59
ECHO BSA: 2.11 M2
ECHO EST RA PRESSURE: 3 MMHG
ECHO LA DIAMETER INDEX: 1.99 CM/M2
ECHO LA DIAMETER: 4.1 CM
ECHO LA TO AORTIC ROOT RATIO: 1.28
ECHO LV E' LATERAL VELOCITY: 10 CM/S
ECHO LV E' SEPTAL VELOCITY: 8 CM/S
ECHO LV FRACTIONAL SHORTENING: 37 % (ref 28–44)
ECHO LV INTERNAL DIMENSION DIASTOLE INDEX: 2.52 CM/M2
ECHO LV INTERNAL DIMENSION DIASTOLIC: 5.2 CM (ref 3.9–5.3)
ECHO LV INTERNAL DIMENSION SYSTOLIC INDEX: 1.6 CM/M2
ECHO LV INTERNAL DIMENSION SYSTOLIC: 3.3 CM
ECHO LV IVSD: 1.1 CM (ref 0.6–0.9)
ECHO LV MASS 2D: 194.2 G (ref 67–162)
ECHO LV MASS INDEX 2D: 94.3 G/M2 (ref 43–95)
ECHO LV POSTERIOR WALL DIASTOLIC: 0.9 CM (ref 0.6–0.9)
ECHO LV RELATIVE WALL THICKNESS RATIO: 0.35
ECHO LVOT AREA: 4.5 CM2
ECHO LVOT DIAM: 2.4 CM
ECHO LVOT PEAK GRADIENT: 4 MMHG
ECHO LVOT PEAK VELOCITY: 1 M/S
ECHO PV MAX VELOCITY: 1.2 M/S
ECHO PV PEAK GRADIENT: 5 MMHG
ECHO RIGHT VENTRICULAR SYSTOLIC PRESSURE (RVSP): 30 MMHG
ECHO RV FREE WALL PEAK S': 14 CM/S
ECHO RV TAPSE: 2.3 CM (ref 1.7–?)
ECHO TV REGURGITANT MAX VELOCITY: 2.62 M/S
ECHO TV REGURGITANT PEAK GRADIENT: 27 MMHG
EOSINOPHIL # BLD: 0.3 K/UL (ref 0–0.4)
EOSINOPHIL NFR BLD: 7 % (ref 0–7)
ERYTHROCYTE [DISTWIDTH] IN BLOOD BY AUTOMATED COUNT: 14.5 % (ref 11.5–14.5)
GLUCOSE BLD STRIP.AUTO-MCNC: 108 MG/DL (ref 65–117)
GLUCOSE BLD STRIP.AUTO-MCNC: 98 MG/DL (ref 65–117)
GLUCOSE SERPL-MCNC: 98 MG/DL (ref 65–100)
HCT VFR BLD AUTO: 31 % (ref 35–47)
HGB BLD-MCNC: 9.7 G/DL (ref 11.5–16)
IMM GRANULOCYTES # BLD AUTO: 0 K/UL (ref 0–0.04)
IMM GRANULOCYTES NFR BLD AUTO: 0 % (ref 0–0.5)
LYMPHOCYTES # BLD: 1.1 K/UL (ref 0.8–3.5)
LYMPHOCYTES NFR BLD: 22 % (ref 12–49)
MCH RBC QN AUTO: 30.2 PG (ref 26–34)
MCHC RBC AUTO-ENTMCNC: 31.3 G/DL (ref 30–36.5)
MCV RBC AUTO: 96.6 FL (ref 80–99)
MONOCYTES # BLD: 0.5 K/UL (ref 0–1)
MONOCYTES NFR BLD: 11 % (ref 5–13)
NEUTS SEG # BLD: 2.8 K/UL (ref 1.8–8)
NEUTS SEG NFR BLD: 59 % (ref 32–75)
NRBC # BLD: 0 K/UL (ref 0–0.01)
NRBC BLD-RTO: 0 PER 100 WBC
PLATELET # BLD AUTO: 363 K/UL (ref 150–400)
PMV BLD AUTO: 8.7 FL (ref 8.9–12.9)
POTASSIUM SERPL-SCNC: 4.3 MMOL/L (ref 3.5–5.1)
RBC # BLD AUTO: 3.21 M/UL (ref 3.8–5.2)
SERVICE CMNT-IMP: NORMAL
SERVICE CMNT-IMP: NORMAL
SODIUM SERPL-SCNC: 132 MMOL/L (ref 136–145)
WBC # BLD AUTO: 4.7 K/UL (ref 3.6–11)

## 2024-04-16 PROCEDURE — 6370000000 HC RX 637 (ALT 250 FOR IP): Performed by: INTERNAL MEDICINE

## 2024-04-16 PROCEDURE — 6370000000 HC RX 637 (ALT 250 FOR IP): Performed by: STUDENT IN AN ORGANIZED HEALTH CARE EDUCATION/TRAINING PROGRAM

## 2024-04-16 PROCEDURE — 6370000000 HC RX 637 (ALT 250 FOR IP): Performed by: NURSE PRACTITIONER

## 2024-04-16 PROCEDURE — 80048 BASIC METABOLIC PNL TOTAL CA: CPT

## 2024-04-16 PROCEDURE — 82962 GLUCOSE BLOOD TEST: CPT

## 2024-04-16 PROCEDURE — 2580000003 HC RX 258: Performed by: INTERNAL MEDICINE

## 2024-04-16 PROCEDURE — 85025 COMPLETE CBC W/AUTO DIFF WBC: CPT

## 2024-04-16 PROCEDURE — 36415 COLL VENOUS BLD VENIPUNCTURE: CPT

## 2024-04-16 PROCEDURE — 93306 TTE W/DOPPLER COMPLETE: CPT

## 2024-04-16 RX ORDER — DIGOXIN 125 MCG
125 TABLET ORAL DAILY
Qty: 30 TABLET | Refills: 1 | Status: SHIPPED | OUTPATIENT
Start: 2024-04-17

## 2024-04-16 RX ORDER — 0.9 % SODIUM CHLORIDE 0.9 %
1000 INTRAVENOUS SOLUTION INTRAVENOUS ONCE
Status: COMPLETED | OUTPATIENT
Start: 2024-04-16 | End: 2024-04-16

## 2024-04-16 RX ORDER — AMIODARONE HYDROCHLORIDE 200 MG/1
200 TABLET ORAL DAILY
Qty: 30 TABLET | Refills: 2 | Status: SHIPPED | OUTPATIENT
Start: 2024-04-16 | End: 2024-04-16 | Stop reason: HOSPADM

## 2024-04-16 RX ORDER — DIGOXIN 125 MCG
125 TABLET ORAL DAILY
Status: DISCONTINUED | OUTPATIENT
Start: 2024-04-16 | End: 2024-04-16 | Stop reason: HOSPADM

## 2024-04-16 RX ORDER — VENLAFAXINE HYDROCHLORIDE 37.5 MG/1
37.5 CAPSULE, EXTENDED RELEASE ORAL DAILY
Qty: 14 CAPSULE | Refills: 0 | Status: SHIPPED | OUTPATIENT
Start: 2024-04-16 | End: 2024-04-30

## 2024-04-16 RX ADMIN — OXCARBAZEPINE 300 MG: 300 TABLET, FILM COATED ORAL at 09:00

## 2024-04-16 RX ADMIN — ARIPIPRAZOLE 10 MG: 5 TABLET ORAL at 08:59

## 2024-04-16 RX ADMIN — OXYBUTYNIN CHLORIDE 5 MG: 5 TABLET, EXTENDED RELEASE ORAL at 09:00

## 2024-04-16 RX ADMIN — APIXABAN 5 MG: 5 TABLET, FILM COATED ORAL at 09:00

## 2024-04-16 RX ADMIN — MICONAZOLE NITRATE: 20 CREAM TOPICAL at 09:00

## 2024-04-16 RX ADMIN — GABAPENTIN 300 MG: 300 CAPSULE ORAL at 08:59

## 2024-04-16 RX ADMIN — DIGOXIN 125 MCG: 125 TABLET ORAL at 15:26

## 2024-04-16 RX ADMIN — Medication 400 MG: at 09:00

## 2024-04-16 RX ADMIN — AMIODARONE HYDROCHLORIDE 200 MG: 200 TABLET ORAL at 09:00

## 2024-04-16 RX ADMIN — SODIUM CHLORIDE 1000 ML: 9 INJECTION, SOLUTION INTRAVENOUS at 15:27

## 2024-04-16 RX ADMIN — VENLAFAXINE HYDROCHLORIDE 75 MG: 37.5 CAPSULE, EXTENDED RELEASE ORAL at 08:59

## 2024-04-16 RX ADMIN — LEVOTHYROXINE SODIUM 75 MCG: 0.07 TABLET ORAL at 09:00

## 2024-04-16 RX ADMIN — METOPROLOL TARTRATE 12.5 MG: 25 TABLET, FILM COATED ORAL at 09:00

## 2024-04-16 RX ADMIN — SODIUM CHLORIDE, PRESERVATIVE FREE 10 ML: 5 INJECTION INTRAVENOUS at 09:00

## 2024-04-16 RX ADMIN — GABAPENTIN 300 MG: 300 CAPSULE ORAL at 13:53

## 2024-04-16 ASSESSMENT — PAIN SCALES - GENERAL
PAINLEVEL_OUTOF10: 0
PAINLEVEL_OUTOF10: 0

## 2024-04-16 NOTE — DISCHARGE INSTRUCTIONS
Stop taking the fluconazole or diflucan(medication your PCP gave your prior to admit, if you have not completed it already)    Digoxin is a medication to help control the atrial fib    Continue eliquis  till told to stop by your PCP or other provider    Eliquis is a blood thinner to reduce the risk of stroke associated with the atrial fib     Works by reducing clotting of blood and subsequently reducing the risk of stroke    Small but increased risk of bleeding on the blood thinner vs. Someone not taking the blood thinner   Vast majority of patients taking the drug will not have any bleeding complications   Bleeding risk is lower than the risk of stroke    Serious or unusual bleeding needs to be evaluated in the emergency room immediately   Uncontrolled heavy nose bleeds  Having persistent  bloody urine   Vomiting blood or black coffee-ground material  Having bloody stools(more than streaks of blood on toilet paper with wiping)   Black, loose stools suggesting partially digested blood, a sign of bleeding   Cuts or wounds that will not bleeding with prolonged pressure     Severe head trauma should also be evaluated    Risk of intracranial bleed or bleed within the brain      Your sodium concentration in your blood was low 122, now improved to 132(normal 137 to 144)   May be an affect of several of the antidepressants you take   Will reduce the venlafaxine to 37.5 mg daily x 2 weeks then stop permanently   Discuss starting a new anti-depressant with your PCP at follow up     Oxcarbazepine or treleptal may also lower the sodium concentration    Continue for now     Need to get sodium checked in 1 week at PCP visit     Avoid excessive fluid intake more than 1500 ml or 50 fluid ounces per day till Na stabilizes    Includes water, soda, coffee/tea, juice, beer/wine/cocktails, soups or broths

## 2024-04-16 NOTE — CARE COORDINATION
04/16/24 1116   Discharge Planning   Patient expects to be discharged to: House   Services At/After Discharge   Transition of Care Consult (CM Consult) N/A   Services At/After Discharge None    Resource Information Provided? No   Mode of Transport at Discharge Other (see comment)   Confirm Follow Up Transport Family   Condition of Participation: Discharge Planning   The Plan for Transition of Care is related to the following treatment goals: PCP and specialist     No further needs from CM    Family to transport home.    English Katerin STRONGCM   [id number]

## 2024-04-16 NOTE — PLAN OF CARE
Problem: Discharge Planning  Goal: Discharge to home or other facility with appropriate resources  4/16/2024 1118 by Glynn Bailey RN  Outcome: Adequate for Discharge  4/16/2024 0118 by Kavitha Valladares RN  Outcome: Progressing  Flowsheets (Taken 4/15/2024 1940)  Discharge to home or other facility with appropriate resources:   Identify barriers to discharge with patient and caregiver   Arrange for needed discharge resources and transportation as appropriate   Identify discharge learning needs (meds, wound care, etc)   Arrange for interpreters to assist at discharge as needed   Refer to discharge planning if patient needs post-hospital services based on physician order or complex needs related to functional status, cognitive ability or social support system     Problem: Pain  Goal: Verbalizes/displays adequate comfort level or baseline comfort level  4/16/2024 1118 by Glynn Bailey RN  Outcome: Adequate for Discharge  4/16/2024 0118 by Kavitha Valladares RN  Outcome: Progressing  Flowsheets (Taken 4/15/2024 1940)  Verbalizes/displays adequate comfort level or baseline comfort level:   Encourage patient to monitor pain and request assistance   Assess pain using appropriate pain scale   Administer analgesics based on type and severity of pain and evaluate response   Implement non-pharmacological measures as appropriate and evaluate response   Consider cultural and social influences on pain and pain management   Notify Licensed Independent Practitioner if interventions unsuccessful or patient reports new pain     Problem: Safety - Adult  Goal: Free from fall injury  4/16/2024 1118 by Glynn Bailey, RN  Outcome: Adequate for Discharge  4/16/2024 0118 by Kavitha Valladares RN  Outcome: Progressing     Problem: ABCDS Injury Assessment  Goal: Absence of physical injury  4/16/2024 1118 by Glynn Bailey RN  Outcome: Adequate for Discharge  4/16/2024 0118 by Kavitha Valladares RN  Outcome: Progressing

## 2024-04-16 NOTE — DISCHARGE SUMMARY
Hospitalist Progress Note    NAME:   Darshana Kraus   : 1950   MRN: [id number]     Admit date: 2024    Discharge date: 24    PCP: Zaid Conti DO    Discharge Diagnoses:    Discharge Medications:  Current Discharge Medication List        START taking these medications    Details   digoxin (LANOXIN) 125 MCG tablet Take 1 tablet by mouth daily  Qty: 30 tablet, Refills: 1      metoprolol tartrate (LOPRESSOR) 25 MG tablet Take 0.5 tablets by mouth 2 times daily  Qty: 60 tablet, Refills: 3      amiodarone (CORDARONE) 200 MG tablet Take 1 tablet by mouth daily  Qty: 30 tablet, Refills: 2           CONTINUE these medications which have CHANGED    Details   venlafaxine (EFFEXOR XR) 37.5 MG extended release capsule Take 1 capsule by mouth daily for 14 days then stop  Qty: 14 capsule, Refills: 0           CONTINUE these medications which have NOT CHANGED    Details   gabapentin (NEURONTIN) 300 MG capsule Take 1 capsule by mouth 3 times daily.      acetaminophen (TYLENOL 8 HOUR ARTHRITIS PAIN) 650 MG extended release tablet Take 1 tablet by mouth every 8 hours as needed for Pain      apixaban (ELIQUIS) 5 MG TABS tablet Take 1 tablet by mouth 2 times daily      ARIPiprazole (ABILIFY) 10 MG tablet Take 1 tablet by mouth daily      atorvastatin (LIPITOR) 20 MG tablet Take 1 tablet by mouth daily      levothyroxine (SYNTHROID) 75 MCG tablet Take 1 tablet by mouth every morning (before breakfast)      metFORMIN (GLUCOPHAGE) 500 MG tablet Take 1 tablet by mouth 2 times daily (with meals)      OXcarbazepine (TRILEPTAL) 300 MG tablet Take 1 tablet by mouth 2 times daily      oxybutynin (DITROPAN-XL) 5 MG extended release tablet Take 1 tablet by mouth daily           STOP taking these medications       valsartan (DIOVAN) 80 MG tablet Comments:   Reason for Stopping:             Follow up with Dr Cornell at [facility] in 3-4 weeks, call the office for an appointment      Phone [phone]

## 2024-04-16 NOTE — PROGRESS NOTES
NAME: Darshana Kraus        :  1950        MRN:  [id number]                   Assessment   :                                               Plan:  Hyponatremia/SIADH  Bipolar       Sodium improving slowly.  Continue fluid restriction.       Oxcarbazepine can lower sodium.  I would continue for now.           Subjective:        Objective:     VITALS:   Last 24hrs VS reviewed since prior progress note. Most recent are:  Vitals:    24 0749   BP: (!) 131/46   Pulse: 94   Resp: 19   Temp:    SpO2:        Intake/Output Summary (Last 24 hours) at 2024 0850  Last data filed at 2024 0124  Gross per 24 hour   Intake --   Output 1200 ml   Net -1200 ml        Telemetry Reviewed:     PHYSICAL EXAM:  General: NAD  No edema       ________________________________________________________________________  Walid MD Guillermo     Procedures: see electronic medical records for all procedures/Xrays and details which  were not copied into this note but were reviewed prior to creation of Plan.      LABS:  Recent Labs     24  0538 24  0446   WBC 6.2 6.1   HGB 10.8* 9.6*   HCT 34.0* 30.4*    358       Recent Labs     24  0237 24  0538 24  0446   * 127* 127*   K 4.6 4.7 4.5   CL 95* 97 99   CO2 25 27 25   BUN 13 26* 26*       Recent Labs     24  0237 24  0538 24  0446   GLOB 4.8* 5.0* 4.8*       No results for input(s): \"INR\", \"APTT\" in the last 72 hours.    Invalid input(s): \"PTP\"   No results for input(s): \"TIBC\", \"FERR\" in the last 72 hours.    Invalid input(s): \"FE\", \"PSAT\"   No results found for: \"FOL\", \"RBCF\"   No results for input(s): \"PH\", \"PCO2\", \"PO2\" in the last 72 hours.  No results for input(s): \"CPK\", \"CKMB\", \"TROPONINI\" in the last 72 hours.  No components found for: \"GLPOC\"  @[email or web address]@    MEDICATIONS:  Current Facility-Administered Medications   Medication Dose Route 
                                                                              NAME: Darshana Kraus        :  1950        MRN:  [id number]                   Assessment   :                                               Plan:  Hyponatremia  Bipolar I suspect hypovolemic hyponatremia given that she says that po intake has been poor for at least a week.     Sodium improving slowly.  Continue fluid restriction.       Oxcarbazepine can lower sodium.  I would continue for now.           Subjective:     Chief Complaint:  \" I didn't sleep well last night.\"  No N/V.  No dyspnea.      Review of Systems:    Symptom Y/N Comments  Symptom Y/N Comments   Fever/Chills    Chest Pain     Poor Appetite    Edema     Cough    Abdominal Pain     Sputum    Joint Pain     SOB/GRACE    Pruritis/Rash     Nausea/vomit    Tolerating PT/OT     Diarrhea    Tolerating Diet     Constipation    Other       Could not obtain due to:      Objective:     VITALS:   Last 24hrs VS reviewed since prior progress note. Most recent are:  Vitals:    24 0918   BP: (!) 93/40   Pulse: 86   Resp: 28   Temp:    SpO2: 94%       Intake/Output Summary (Last 24 hours) at 2024 1013  Last data filed at 2024 2102  Gross per 24 hour   Intake 840 ml   Output 300 ml   Net 540 ml        Telemetry Reviewed:     PHYSICAL EXAM:  General: NAD  No edema      Lab Data Reviewed: (see below)    Medications Reviewed: (see below)    PMH/SH reviewed - no change compared to H&P  ________________________________________________________________________  Care Plan discussed with:  Patient     Family      RN     Care Manager                    Consultant:          Comments   >50% of visit spent in counseling and coordination of care       ________________________________________________________________________  Mike Argueta MD     Procedures: see electronic medical records for all procedures/Xrays and details which  were not copied into this note but were reviewed prior 
                                                                         NAME: Darshana Kraus        :  1950        MRN:  [id number]                   Assessment   :                                               Plan:  Hyponatremia/SIADH  Bipolar Sodium improving slowly, april 122, now 131  Continue fluid restriction.       Oxcarbazepine can lower sodium.  Can continue for now.           Subjective:    Oob in chair. Denies complaint. No sob.     Objective:     VITALS:   Last 24hrs VS reviewed since prior progress note. Most recent are:  Vitals:    04/15/24 0400   BP: 117/60   Pulse: 74   Resp: 18   Temp:    SpO2: 98%       Intake/Output Summary (Last 24 hours) at 4/15/2024 0601  Last data filed at 2024 1825  Gross per 24 hour   Intake 51.7 ml   Output 925 ml   Net -873.3 ml        Telemetry Reviewed:     PHYSICAL EXAM:  General: NAD  No edema       ________________________________________________________________________  Windy Aponte MD     Procedures: see electronic medical records for all procedures/Xrays and details which  were not copied into this note but were reviewed prior to creation of Plan.      LABS:  Recent Labs     246 04/15/24  014   WBC 5.7 5.7   HGB 10.1* 9.9*   HCT 31.2* 30.9*    354       Recent Labs     24  0446 04/14/24  0346 04/15/24  014   * 130* 131*   K 4.5 4.5 4.3   CL 99 99 100   CO2 25 29 27   BUN 26* 23* 25*   MG  --   --  1.9   PHOS  --  4.3 4.5       Recent Labs     24  0446 04/15/24  0140   GLOB 4.8* 4.9*       No results for input(s): \"INR\", \"APTT\" in the last 72 hours.    Invalid input(s): \"PTP\"   No results for input(s): \"TIBC\", \"FERR\" in the last 72 hours.    Invalid input(s): \"FE\", \"PSAT\"   No results found for: \"FOL\", \"RBCF\"   No results for input(s): \"PH\", \"PCO2\", \"PO2\" in the last 72 hours.  No results for input(s): \"CPK\", \"CKMB\", \"TROPONINI\" in the last 72 hours.  No components found for: \"GLPOC\"  @[email or web address]@    MEDICATIONS:  Current 
                                                                         NAME: Darshana Kraus        :  1950        MRN:  [id number]                   Assessment   :                                               Plan:  Hyponatremia/SIADH  Bipolar Sodium improving slowly, april 122, now 131 to 132  Continue fluid restriction.       Oxcarbazepine can lower sodium.  Can continue for now.      I will sign off, but please call if questions/changes.         Subjective:    Oob in chair. Denies complaint. No sob.     Objective:     VITALS:   Last 24hrs VS reviewed since prior progress note. Most recent are:  Vitals:    24 0606   BP: (!) 134/53   Pulse: 55   Resp:    Temp:    SpO2:        Intake/Output Summary (Last 24 hours) at 2024 0613  Last data filed at 4/15/2024 2153  Gross per 24 hour   Intake 610 ml   Output --   Net 610 ml        Telemetry Reviewed:     PHYSICAL EXAM:  General: NAD  No edema       ________________________________________________________________________  Windy Aponte MD     Procedures: see electronic medical records for all procedures/Xrays and details which  were not copied into this note but were reviewed prior to creation of Plan.      LABS:  Recent Labs     04/15/24  01424  0435   WBC 5.7 4.7   HGB 9.9* 9.7*   HCT 30.9* 31.0*    363       Recent Labs     24  0346 04/15/24  0140 24  0435   * 131* 132*   K 4.5 4.3 4.3   CL 99 100 98   CO2 29 27 29   BUN 23* 25* 31*   MG  --  1.9  --    PHOS 4.3 4.5  --        Recent Labs     04/15/24  014   GLOB 4.9*       No results for input(s): \"INR\", \"APTT\" in the last 72 hours.    Invalid input(s): \"PTP\"   No results for input(s): \"TIBC\", \"FERR\" in the last 72 hours.    Invalid input(s): \"FE\", \"PSAT\"   No results found for: \"FOL\", \"RBCF\"   No results for input(s): \"PH\", \"PCO2\", \"PO2\" in the last 72 hours.  No results for input(s): \"CPK\", \"CKMB\", \"TROPONINI\" in the last 72 hours.  No components found for: 
        [facility]  [address], [place] [id number] [phone]  [email or web address]  CARDIOLOGY PROGRESS NOTE    4/15/2024 4:27 PM    Admit Date: 4/9/2024    Admit Diagnosis:   Hyponatremia [E87.1]  A-fib (HCC) [I48.91]  Atrial fibrillation with RVR (HCC) [I48.91]    Subjective:     Darshana Kraus seen and examined at the bedside, sitting in chair.  No cardiac complaints.    BP (!) 114/44   Pulse 67   Temp 97.2 °F (36.2 °C) (Oral)   Resp 12   Ht 1.727 m (5' 8\")   Wt 92.4 kg (203 lb 11.3 oz)   SpO2 96%   BMI 30.97 kg/m²     Current Facility-Administered Medications   Medication Dose Route Frequency    cefTRIAXone (ROCEPHIN) 1,000 mg in sodium chloride 0.9 % 50 mL IVPB (mini-bag)  1,000 mg IntraVENous Q24H    metoprolol tartrate (LOPRESSOR) tablet 25 mg  25 mg Oral BID    hydrOXYzine HCl (ATARAX) tablet 25 mg  25 mg Oral Q6H PRN    triamcinolone (KENALOG) 0.1 % cream   Topical BID PRN    amiodarone (CORDARONE) tablet 200 mg  200 mg Oral BID    magnesium oxide (MAG-OX) tablet 400 mg  400 mg Oral BID    miconazole (MICOTIN) 2 % cream   Topical BID    sodium chloride flush 0.9 % injection 5-40 mL  5-40 mL IntraVENous 2 times per day    sodium chloride flush 0.9 % injection 5-40 mL  5-40 mL IntraVENous PRN    0.9 % sodium chloride infusion   IntraVENous PRN    ondansetron (ZOFRAN-ODT) disintegrating tablet 4 mg  4 mg Oral Q8H PRN    Or    ondansetron (ZOFRAN) injection 4 mg  4 mg IntraVENous Q6H PRN    polyethylene glycol (GLYCOLAX) packet 17 g  17 g Oral Daily PRN    acetaminophen (TYLENOL) tablet 650 mg  650 mg Oral Q6H PRN    Or    acetaminophen (TYLENOL) suppository 650 mg  650 mg Rectal Q6H PRN    apixaban (ELIQUIS) tablet 5 mg  5 mg Oral BID    ARIPiprazole (ABILIFY) tablet 10 mg  10 mg Oral Daily    gabapentin (NEURONTIN) capsule 300 mg  300 mg Oral TID    levothyroxine (SYNTHROID) tablet 75 mcg  75 mcg Oral QAM AC    OXcarbazepine (TRILEPTAL) tablet 300 mg  300 
      Hospitalist Progress Note    NAME:   Darshana Kraus   : 1950   MRN: [id number]     Date/Time: 2024 2:48 PM  Patient PCP: Zaid Conti DO    Estimated discharge date:48 hours  Barriers:   Sodium improvement nephrology clearance    Assessment / Plan:    A-fib RVR  Dilated cardiomyopathy  Hypertension  Currently on diltiazem drip  Continue Eliquis  Follows with Dr. Barrett cardiology  Cardiology consult appreciated  Check TSH  Magnesium supplementation 400 mg daily on discharge    Cardiology recs  Start atenolol 50 mg daily  Discontinue valsartan to allow room for atenolol  If difficulty controlling heart rate with soft blood pressure, add amiodarone 200 twice daily      -Patient hypotensive  -Atenolol held  -Status post 1l IV fluid bolus  -Patient denies any dizziness  -A flutter on EKG strip, rate controlled  -Cardiology and nephrology notified regarding the same         Hyponatremia  Secondary to volume depletion versus medication induced-on Trileptal  Had previous episode of hyponatremia in 116 causing seizures, HCTZ was discontinued at the time  Appreciate nephrology consult  Status post IV fluids  BMP in a.m.  Start fluid restriction  Sodium improving 122> 124> 126> 127     On Trileptal which can lower sodium, okay to continue per nephro       Bipolar disease  Neuropathy  Continue Abilify, follow-up Trileptal, Effexor, gabapentin        Hypothyroidism  Continue Synthroid  TSH pending        ?  Bladder issues  Continue oxybutynin     Candida genitourinary   Added Diflucan x 3 days  Miconazole cream      Medical Decision Making:   I personally reviewed labs: CBC BMP  I personally reviewed imaging:  I personally reviewed EKG:  Toxic drug monitoring:   Discussed case with: Patient RN, cardiology and nephrology perfect served, awaiting         Code Status: Full code  DVT Prophylaxis:   GI Prophylaxis:    Subjective:     Chief Complaint / Reason for Physician Visit  Denies any shortness 
      Hospitalist Progress Note    NAME:   Darshana Kraus   : 1950   MRN: [id number]     Date/Time: 2024 8:02 AM  Patient PCP: Zaid Conti DO    Estimated discharge date:48 hours  Barriers: Sodium improvement nephrology clearance, a.fib control    Assessment / Plan:  Addendum:  Pt reported dysurea today, check UA with reflex cultures      A-fib RVR  Dilated cardiomyopathy  Hypertension  Intermittent tachycardia with HR in 150s on tele  Nor longer on cardizem drip and Atenolol been held due to hypotension  Will start Metoprolol 12.5mg BID and reconsult cardiology  Continue Eliquis  Follows with Dr. Barrett cardiology  TSH WNL  Discontinued valsartan to allow room for beta blockers     Hyponatremia  Suspect SIADH, possible medication induced-on Trileptal  Had previous episode of hyponatremia in 116 causing seizures, HCTZ was discontinued at the time  Appreciate nephrology consult  Status post IV fluids  BMP in a.m.  Continue fluid restriction  Sodium improving, currently stable at 127     On Trileptal which can lower sodium, weaning off as outpatient if needed per psychiatry      Bipolar disease  Neuropathy  Continue Abilify, follow-up Trileptal, Effexor, gabapentin     Chronic Itching  Claims takes PO meds and PRN ointment at home, doesn't recall name  Start PRN hydroxyzine and Triamcinolone cream     Hypothyroidism  Continue Synthroid  TSH WNL     Overactive bladder  Continue oxybutynin     Yeast infection  Given Diflucan x 3 days  Continue Miconazole    Medical Decision Making:   I personally reviewed labs: CBC BMP  I personally reviewed imaging:  I personally reviewed EKG:  Toxic drug monitoring:   Discussed case with: Patient RN    Code Status: Full code  DVT Prophylaxis:   GI Prophylaxis:    Subjective:     Chief Complaint / Reason for Physician Visit  Pt seen and examined at bedside. Recurrent a.fb with RVR on tele. Complaints of chronic itching, doesn't remember which meds takes at home. She 
  Physician Progress Note      PATIENT:               LIVIER RODRIGUEZ  CSN #:                  [id number]  :                       1950  ADMIT DATE:       2024 12:55 PM  DISCH DATE:  RESPONDING  PROVIDER #:        Janeth Larsen MD          QUERY TEXT:    Patient admitted with hyponatremia and Afib maintained on Eliquis. Please   document in progress notes and discharge summary if you are evaluating and/or   treating any of the following:    The medical record reflects the following:  Risk Factors: 73 yr old female with HTN  Clinical Indicators: Atrial fibrillation  Treatment: Eliquis 5 mg po bid      Thank you,  Kade Lozano RN, CDI, CCDS  [email or web address] or via Perfect Serve  Options provided:  -- Secondary hypercoagulable state in a patient with atrial fibrillation  -- Other - I will add my own diagnosis  -- Disagree - Not applicable / Not valid  -- Disagree - Clinically unable to determine / Unknown  -- Refer to Clinical Documentation Reviewer    PROVIDER RESPONSE TEXT:    This patient has secondary hypercoagulable state in a patient with atrial   fibrillation.    Query created by: KADE LOZANO on 2024 12:55 PM      Electronically signed by:  Janeth Larsen MD 2024 6:41 PM          
0700- Bedside shift change report given to Florentino (oncoming nurse) by Mariya (offgoing nurse). Report included the following information Nurse Handoff Report, Index, Adult Overview, Intake/Output, MAR, and Recent Results.     1130- Dr. Larsen rounding on pt. PRN Zofran given for nausea.     1500- Shift report given to Sunil STRONG.   
0700: End of Shift Note    Bedside shift change report given to LIGIA Maki (oncoming nurse) by Kavitha Valladares RN (offgoing nurse).  Report included the following information SBAR, Kardex, ED Summary, Intake/Output, MAR, and Cardiac Rhythm Sinus [place]    Shift worked:  0196-1647     Shift summary and any significant changes:    -2200: SS insulin held for     -2330: Digoxin held for HR 55    -0500: Digoxin held for HR 60   Concerns for physician to address: -     Zone phone for oncoming shift:  [phone]       Kavitha Valladares RN                            
Consult received  
Dc paperwork reviewed with patient. Aware of medications, side effects, fu appointments. Verbalized understanding with no further questions.     Awaiting ride  
End of Shift Note    Bedside shift change report given to Mariya STRONG (oncoming nurse) by DENNY MANN RN (offgoing nurse).  Report included the following information SBAR    Shift worked:  7am-7pm     Shift summary and any significant changes:         Sitting up in chair most of the shift No significant change noted  continued 1200 ml fluid restriction           Concerns for physician to address:  None     Zone phone for oncoming shift:          Activity:     Number times ambulated in hallways past shift: 0  Number of times OOB to chair past shift: 1    Cardiac:   Cardiac Monitoring: Yes           Access:  Current line(s): PIV     Genitourinary:   Urinary status: voiding    Respiratory:      Chronic home O2 use?: NO  Incentive spirometer at bedside: NO       GI:     Current diet:  ADULT DIET; Regular; Low Fat/Low Chol/High Fiber/2 gm Na; 1200 ml  ADULT ORAL NUTRITION SUPPLEMENT; Breakfast, Dinner; Low Calorie/High Protein Oral Supplement  Passing flatus: YES  Tolerating current diet: YES       Pain Management:   Patient states pain is manageable on current regimen: YES    Skin:     Interventions: float heels and increase time out of bed    Patient Safety:  Fall Score:    Interventions: bed/chair alarm and gripper socks       Length of Stay:  Expected LOS: 4  Actual LOS: 2      DENNY MANN, RN                            
End of Shift Note    Bedside shift change report given to Mariya STRONG (oncoming nurse) by DENNY MANN RN (offgoing nurse).  Report included the following information SBAR    Shift worked:  7am-7pm     Shift summary and any significant changes:     No significant change noted dil drip stop 1753 patient received 50 mg atenolol po prior to stop the dil. Diflucan 150 mg po given for fungal infection to the groin area.      Concerns for physician to address:  None      Zone phone for oncoming shift:          Activity:     Number times ambulated in hallways past shift: 0  Number of times OOB to chair past shift: 0    Cardiac:   Cardiac Monitoring: Yes           Access:  Current line(s): PIV     Genitourinary:   Urinary status: voiding    Respiratory:      Chronic home O2 use?: NO  Incentive spirometer at bedside: NO       GI:     Current diet:  ADULT DIET; Regular; Low Fat/Low Chol/High Fiber/2 gm Na; 1200 ml  Passing flatus: YES  Tolerating current diet: YES       Pain Management:   Patient states pain is manageable on current regimen: YES    Skin:     Interventions: float heels and increase time out of bed    Patient Safety:  Fall Score:    Interventions: bed/chair alarm and gripper socks       Length of Stay:  Expected LOS: 3  Actual LOS: 1      DENNY MANN, LIGIA                            
I aggred Candi [place] shift assessment  Nursing student   
Pharmacy Medication Reconciliation     The patient was interviewed regarding current PTA medication list, use and drug allergies;  patient present in room and obtained permission from patient to discuss drug regimen with visitor(s) present. The patient was questioned regarding use of any other inhalers, topical products, over the counter medications, herbal medications, vitamin products or ophthalmic/nasal/otic medication use.     Allergy Update: Esomeprazole magnesium, Hydrochlorothiazide, and Lisinopril    Recommendations/Findings:   The following amendments were made to the patient's active medication list on file at [facility]:   1) Additions:   +Gabapentin 300mg  +tylenol 650mg  2) Deletions:   -citalopram 20mg  -amlodipine 5mg  -famotidine 20mg    3) Changes: none    Pertinent Findings: Patient states to take 3 tablets of acetaminophen 650mg this morning PTA    Clarified PTA med list with rx query, patient interview. PTA medication list was corrected to the following:     Prior to Admission Medications   Prescriptions Last Dose Informant   ARIPiprazole (ABILIFY) 10 MG tablet 4/9/2024 at 0900 Self   Sig: Take 1 tablet by mouth daily   OXcarbazepine (TRILEPTAL) 300 MG tablet 4/9/2024 at 0900 Self   Sig: Take 1 tablet by mouth 2 times daily   acetaminophen (TYLENOL 8 HOUR ARTHRITIS PAIN) 650 MG extended release tablet 4/9/2024 at 0900 Self   Sig: Take 1 tablet by mouth every 8 hours as needed for Pain   apixaban (ELIQUIS) 5 MG TABS tablet 4/9/2024 at 0900 Self   Sig: Take 1 tablet by mouth 2 times daily   atorvastatin (LIPITOR) 20 MG tablet 4/9/2024 at 0900 Self   Sig: Take 1 tablet by mouth daily   gabapentin (NEURONTIN) 300 MG capsule 4/9/2024 at 0900 Self   Sig: Take 1 capsule by mouth 3 times daily.   levothyroxine (SYNTHROID) 75 MCG tablet 4/9/2024 at 0900 Self   Sig: Take 1 tablet by mouth every morning (before breakfast)   metFORMIN (GLUCOPHAGE) 500 MG tablet 4/9/2024 at 0900 Self   Sig: Take 1 tablet by mouth 2 
Pt noted to have sustained elevated heart rate from 125-136 during report. Scheduled amlodipine and metoprolol given and Rapid Nurse called to get Pt on her rounding list.  
Restart by pharmacy because of low heart rate, digoxin doses held.  Noted ongoing hypotension.  Recommend:    -Discontinue beta-blocker and amiodarone  -Continue digoxin 125 mcg daily orally without loading dose.  
Sodium better at 130  
Spiritual Care Partner Volunteer visited patient at [facility] in [facility] CARDIOPULMONARY CARE on 4/15/2024   Documented by: Chaplain Ever Mccoy M.Div., [facility].   Paging Service: [phone])  
0313 135/62 98.2 °F (36.8 °C) Oral 78 16 98 % -- --   04/10/24 0300 133/71 -- -- 68 17 96 % -- --   04/10/24 0200 131/61 -- -- 78 17 -- -- --   04/10/24 0100 123/68 -- -- 78 17 95 % -- --   04/10/24 0000 (!) 145/63 -- -- 82 18 95 % -- --   04/09/24 2302 (!) 115/56 97.9 °F (36.6 °C) Oral 82 20 98 % -- --   04/09/24 2130 (!) 123/45 97.5 °F (36.4 °C) Oral 81 21 97 % -- 92.4 kg (203 lb 11.3 oz)   04/09/24 1900 133/85 -- -- (!) 111 17 97 % -- --   04/09/24 1800 116/72 -- -- 99 17 96 % -- --   04/09/24 1645 (!) 142/87 -- -- 98 19 96 % -- --   04/09/24 1630 138/71 -- -- (!) 106 15 95 % -- --   04/09/24 1615 (!) 125/90 -- -- (!) 102 18 96 % -- --   04/09/24 1600 122/71 -- -- (!) 113 21 97 % -- --   04/09/24 1515 (!) 145/109 -- -- (!) 115 20 96 % -- --   04/09/24 1500 (!) 127/59 -- -- (!) 109 20 98 % -- --   04/09/24 1445 (!) 149/72 -- -- (!) 138 17 -- -- --   04/09/24 1430 (!) 145/106 -- -- (!) 109 20 96 % -- --   04/09/24 1415 (!) 145/90 -- -- (!) 113 22 -- -- --   04/09/24 1400 (!) 129/51 -- -- 92 15 -- -- --   04/09/24 1345 (!) 156/108 -- -- (!) 126 18 96 % -- --   04/09/24 1245 (!) 150/104 97.7 °F (36.5 °C) Oral (!) 155 22 98 % 1.727 m (5' 8\") 96.2 kg (212 lb 1.3 oz)         Intake/Output Summary (Last 24 hours) at 4/10/2024 0827  Last data filed at 4/10/2024 0644  Gross per 24 hour   Intake 552.74 ml   Output --   Net 552.74 ml        I had a face to face encounter and independently examined this patient on 4/10/2024, as outlined below:  PHYSICAL EXAM:  General: Alert, cooperative  EENT:  EOMI. Anicteric sclerae.  Resp:  CTA bilaterally, no wheezing or rales.  No accessory muscle use  CV:  Regular  rhythm,  No edema  GI:  Soft, Non distended, Non tender.  +Bowel sounds  Neurologic:  Alert and oriented X 3, normal speech,   Psych:   Good insight. Not anxious nor agitated  Skin:  No rashes.  No jaundice    Reviewed most current lab test results and cultures  YES  Reviewed most current radiology test results   YES  Review 
1.3AF)  Weight Used for Protein Requirements: Current  Protein (g/day): 74-92g (0.8-1.0g/kg)  Method Used for Fluid Requirements: 1 ml/kcal  Fluid (ml/day): 1900mL    Nutrition Diagnosis:   Inadequate protein-energy intake related to  (decreased appetite) as evidenced by poor intake prior to admission, intake 0-25%    Nutrition Interventions:   Food and/or Nutrient Delivery: Continue Current Diet, Start Oral Nutrition Supplement  Nutrition Education/Counseling: No recommendation at this time  Coordination of Nutrition Care: Continue to monitor while inpatient       Goals:     Goals: PO intake 50% or greater, by next RD assessment, PO intake 75% or greater       Nutrition Monitoring and Evaluation:   Behavioral-Environmental Outcomes: None Identified  Food/Nutrient Intake Outcomes: Food and Nutrient Intake, Supplement Intake  Physical Signs/Symptoms Outcomes: Biochemical Data, GI Status, Nutrition Focused Physical Findings, Weight    Discharge Planning:    Continue current diet     Natali Valdez RD  Contact: [phone]    
imaging: Chest x-ray  I personally reviewed EKG:  Toxic drug monitoring: H&H while patient is on Eliquis, liver function while patient is on amiodarone.  Discussed case with: Patient, RN, cardiology        Code Status: Full code  DVT Prophylaxis: Eliquis  GI Prophylaxis:    Subjective:     Chief Complaint / Reason for Physician Visit  \" Follow-up for A-fib RVR, dilated cardiomyopathy, HTN, acute UTI, hyponatremia, SIADH, bipolar disorder, neuropathy, chronic HTN, hypothyroidism, overactive bladder, yeast infection.\".  Discussed with RN events overnight.       Objective:     VITALS:   Last 24hrs VS reviewed since prior progress note. Most recent are:  Patient Vitals for the past 24 hrs:   BP Temp Temp src Pulse Resp SpO2   04/14/24 0330 (!) 151/80 98.1 °F (36.7 °C) Oral (!) 108 18 94 %   04/13/24 2246 129/82 98 °F (36.7 °C) Oral 100 18 94 %   04/13/24 2152 (!) 144/55 -- -- (!) 101 -- --   04/13/24 1915 105/61 97.7 °F (36.5 °C) Oral (!) 104 18 97 %   04/13/24 1624 (!) 134/101 -- -- (!) 124 17 --   04/13/24 1404 (!) 137/56 -- -- (!) 132 18 --   04/13/24 1400 (!) 123/108 97.7 °F (36.5 °C) Oral 97 17 97 %   04/13/24 1000 (!) 119/38 97.7 °F (36.5 °C) Oral (!) 103 17 97 %   04/13/24 0900 -- -- -- -- -- 95 %         Intake/Output Summary (Last 24 hours) at 4/14/2024 0752  Last data filed at 4/14/2024 0347  Gross per 24 hour   Intake 360 ml   Output 1200 ml   Net -840 ml        I had a face to face encounter and independently examined this patient on 4/14/2024, as outlined below:  PHYSICAL EXAM:  General: Alert, ill looking, obese, cooperative  EENT:  EOMI. Anicteric sclerae.  Resp:  Diminished breath sounds  CV:  Tachycardic, irregularly irregular rhythm,  No edema  GI:  Soft, Non distended, Non tender.  +Bowel sounds  Neurologic:  Alert and oriented X 3, normal speech,   Psych:   Not anxious nor agitated  Skin:  No rashes.  No jaundice    Reviewed most current lab test results and cultures  YES  Reviewed most current 
133* 124* 126*   BUN 5* 9 9 13   CREATININE 0.68 0.63 0.68 0.80   CALCIUM 9.1 9.4 9.4 9.4   MG 1.7  --   --   --    PHOS  --  3.4  --   --    LABALBU 3.7 3.7  --  3.5   BILITOT 0.5 0.9  --  0.4   AST 58* 55*  --  43*   ALT 31 30  --  26         Signed: Janeth Larsen MD        
300 mg  300 mg Oral TID    levothyroxine (SYNTHROID) tablet 75 mcg  75 mcg Oral QAM AC    OXcarbazepine (TRILEPTAL) tablet 300 mg  300 mg Oral BID    oxyBUTYnin (DITROPAN-XL) extended release tablet 5 mg  5 mg Oral Daily    venlafaxine (EFFEXOR XR) extended release capsule 75 mg  75 mg Oral Daily    insulin lispro (HUMALOG) injection vial 0-4 Units  0-4 Units SubCUTAneous TID WC    insulin lispro (HUMALOG) injection vial 0-4 Units  0-4 Units SubCUTAneous Nightly          REVIEW OF SYSTEMS  Review of Systems   Constitutional: Negative.    HENT: Negative.     Respiratory:  Positive for shortness of breath.    Cardiovascular: Negative.    Gastrointestinal: Negative.    Psychiatric/Behavioral: Negative.              Objective:      Vitals:    04/14/24 0916   BP: (!) 139/53   Pulse: (!) 101   Resp:    Temp:    SpO2:        Physical Exam:    Constitutional:       General: She is not in acute distress.  HENT:      Head: Normocephalic and atraumatic.   Cardiovascular:      Rate and Rhythm: Normal rate and regular rhythm.   Pulmonary:      Effort: Pulmonary effort is normal.   Musculoskeletal:      Cervical back: Neck supple.   Skin:     General: Skin is warm and dry.   Neurological:      Mental Status: She is alert.        Data Review:   Recent Labs     04/12/24  0538 04/13/24  0446 04/14/24  0346   WBC 6.2 6.1 5.7   HGB 10.8* 9.6* 10.1*   HCT 34.0* 30.4* 31.2*    358 384       Recent Labs     04/12/24  0538 04/13/24  0446 04/14/24  0346   * 127* 130*   K 4.7 4.5 4.5   CL 97 99 99   CO2 27 25 29   BUN 26* 26* 23*   PHOS  --   --  4.3   ALT 23 24  --        Lab Results   Component Value Date    QZL9MWQ 2.86 04/10/2024           Intake/Output Summary (Last 24 hours) at 4/14/2024 1259  Last data filed at 4/14/2024 0915  Gross per 24 hour   Intake --   Output 1475 ml   Net -1475 ml             Cardiographics     Telemetry: Atrial fibrillation, rate in the 70s for the most part.  Intermittent spurts into the 
of old records today    NO  Reviewed patient's current orders and MAR    YES  PMH/SH reviewed - no change compared to H&P    Procedures: see electronic medical records for all procedures/Xrays and details which were not copied into this note but were reviewed prior to creation of Plan.      LABS:  I reviewed today's most current labs and imaging studies.  Pertinent labs include:  Recent Labs     04/13/24  0446 04/14/24  0346 04/15/24  0140   WBC 6.1 5.7 5.7   HGB 9.6* 10.1* 9.9*   HCT 30.4* 31.2* 30.9*    384 354       Recent Labs     04/13/24  0446 04/14/24  0346 04/15/24  0140   * 130* 131*   K 4.5 4.5 4.3   CL 99 99 100   CO2 25 29 27   GLUCOSE 110* 113* 123*   BUN 26* 23* 25*   CREATININE 0.87 0.86 0.90   CALCIUM 9.1 9.5 9.2   MG  --   --  1.9   PHOS  --  4.3 4.5   LABALBU 3.1* 3.3* 3.4*   BILITOT 0.6  --  0.4   AST 34  --  42*   ALT 24  --  26         Signed: Jong Ovalle Jr, MD          
tablet 75 mcg  75 mcg Oral QAM AC    OXcarbazepine (TRILEPTAL) tablet 300 mg  300 mg Oral BID    oxyBUTYnin (DITROPAN-XL) extended release tablet 5 mg  5 mg Oral Daily    venlafaxine (EFFEXOR XR) extended release capsule 75 mg  75 mg Oral Daily    insulin lispro (HUMALOG) injection vial 0-4 Units  0-4 Units SubCUTAneous TID WC    insulin lispro (HUMALOG) injection vial 0-4 Units  0-4 Units SubCUTAneous Nightly

## 2024-04-16 NOTE — PLAN OF CARE
Problem: Discharge Planning  Goal: Discharge to home or other facility with appropriate resources  Outcome: Progressing  Flowsheets (Taken 4/15/2024 1940)  Discharge to home or other facility with appropriate resources:   Identify barriers to discharge with patient and caregiver   Arrange for needed discharge resources and transportation as appropriate   Identify discharge learning needs (meds, wound care, etc)   Arrange for interpreters to assist at discharge as needed   Refer to discharge planning if patient needs post-hospital services based on physician order or complex needs related to functional status, cognitive ability or social support system     Problem: Pain  Goal: Verbalizes/displays adequate comfort level or baseline comfort level  Outcome: Progressing  Flowsheets (Taken 4/15/2024 1940)  Verbalizes/displays adequate comfort level or baseline comfort level:   Encourage patient to monitor pain and request assistance   Assess pain using appropriate pain scale   Administer analgesics based on type and severity of pain and evaluate response   Implement non-pharmacological measures as appropriate and evaluate response   Consider cultural and social influences on pain and pain management   Notify Licensed Independent Practitioner if interventions unsuccessful or patient reports new pain     Problem: Safety - Adult  Goal: Free from fall injury  Outcome: Progressing     Problem: ABCDS Injury Assessment  Goal: Absence of physical injury  Outcome: Progressing     Problem: Skin/Tissue Integrity  Goal: Absence of new skin breakdown  Description: 1.  Monitor for areas of redness and/or skin breakdown  2.  Assess vascular access sites hourly  3.  Every 4-6 hours minimum:  Change oxygen saturation probe site  4.  Every 4-6 hours:  If on nasal continuous positive airway pressure, respiratory therapy assess nares and determine need for appliance change or resting period.  Outcome: Progressing     Problem: Physical

## 2024-04-26 ENCOUNTER — OFFICE VISIT (OUTPATIENT)
Age: 74
End: 2024-04-26
Payer: MEDICARE

## 2024-04-26 VITALS
SYSTOLIC BLOOD PRESSURE: 148 MMHG | HEIGHT: 67 IN | BODY MASS INDEX: 31.86 KG/M2 | HEART RATE: 70 BPM | OXYGEN SATURATION: 100 % | DIASTOLIC BLOOD PRESSURE: 82 MMHG | WEIGHT: 203 LBS

## 2024-04-26 DIAGNOSIS — I10 ESSENTIAL (PRIMARY) HYPERTENSION: ICD-10-CM

## 2024-04-26 DIAGNOSIS — Z09 HOSPITAL DISCHARGE FOLLOW-UP: ICD-10-CM

## 2024-04-26 DIAGNOSIS — I42.0 DILATED CARDIOMYOPATHY (HCC): ICD-10-CM

## 2024-04-26 DIAGNOSIS — F10.10 ALCOHOL ABUSE: ICD-10-CM

## 2024-04-26 DIAGNOSIS — E87.1 ACUTE HYPONATREMIA: ICD-10-CM

## 2024-04-26 DIAGNOSIS — I49.5 SSS (SICK SINUS SYNDROME) (HCC): ICD-10-CM

## 2024-04-26 DIAGNOSIS — I48.0 PAROXYSMAL ATRIAL FIBRILLATION (HCC): Primary | ICD-10-CM

## 2024-04-26 DIAGNOSIS — R79.89 ABNORMAL LFTS (LIVER FUNCTION TESTS): ICD-10-CM

## 2024-04-26 PROCEDURE — 99214 OFFICE O/P EST MOD 30 MIN: CPT | Performed by: SPECIALIST

## 2024-04-26 PROCEDURE — 1111F DSCHRG MED/CURRENT MED MERGE: CPT | Performed by: SPECIALIST

## 2024-04-26 PROCEDURE — 1036F TOBACCO NON-USER: CPT | Performed by: SPECIALIST

## 2024-04-26 PROCEDURE — 1123F ACP DISCUSS/DSCN MKR DOCD: CPT | Performed by: SPECIALIST

## 2024-04-26 PROCEDURE — G8400 PT W/DXA NO RESULTS DOC: HCPCS | Performed by: SPECIALIST

## 2024-04-26 PROCEDURE — G8427 DOCREV CUR MEDS BY ELIG CLIN: HCPCS | Performed by: SPECIALIST

## 2024-04-26 PROCEDURE — 3077F SYST BP >= 140 MM HG: CPT | Performed by: SPECIALIST

## 2024-04-26 PROCEDURE — 3017F COLORECTAL CA SCREEN DOC REV: CPT | Performed by: SPECIALIST

## 2024-04-26 PROCEDURE — 3079F DIAST BP 80-89 MM HG: CPT | Performed by: SPECIALIST

## 2024-04-26 PROCEDURE — 1090F PRES/ABSN URINE INCON ASSESS: CPT | Performed by: SPECIALIST

## 2024-04-26 PROCEDURE — G8417 CALC BMI ABV UP PARAM F/U: HCPCS | Performed by: SPECIALIST

## 2024-04-26 RX ORDER — LATANOPROST 50 UG/ML
SOLUTION/ DROPS OPHTHALMIC
COMMUNITY
Start: 2024-03-20

## 2024-04-26 RX ORDER — AMIODARONE HYDROCHLORIDE 200 MG/1
TABLET ORAL
COMMUNITY
Start: 2024-04-16

## 2024-04-26 RX ORDER — AMLODIPINE BESYLATE 2.5 MG/1
2.5 TABLET ORAL DAILY
Qty: 30 TABLET | Refills: 3 | Status: SHIPPED | OUTPATIENT
Start: 2024-04-26

## 2024-04-26 RX ORDER — CLOTRIMAZOLE AND BETAMETHASONE DIPROPIONATE 10; .64 MG/G; MG/G
CREAM TOPICAL 2 TIMES DAILY
COMMUNITY

## 2024-04-26 ASSESSMENT — PATIENT HEALTH QUESTIONNAIRE - PHQ9
1. LITTLE INTEREST OR PLEASURE IN DOING THINGS: NOT AT ALL
2. FEELING DOWN, DEPRESSED OR HOPELESS: NOT AT ALL
SUM OF ALL RESPONSES TO PHQ QUESTIONS 1-9: 0
SUM OF ALL RESPONSES TO PHQ9 QUESTIONS 1 & 2: 0

## 2024-04-26 ASSESSMENT — ENCOUNTER SYMPTOMS: BACK PAIN: 1

## 2024-04-26 NOTE — PROGRESS NOTES
After pt left her daughter came back and asked if she can hold blood thinner prior to steroid back injections. Per Dr. Barrett patient can hold pradaxa 2 days prior to procedure then resume day after. Notified pt's daughter.  
Findings: Rash present.   Neurological:      Mental Status: She is alert and oriented to person, place, and time.   Psychiatric:         Behavior: Behavior normal.          ASSESSMENT and PLAN  1. Paroxysmal atrial fibrillation (HCC)  2. Hospital discharge follow-up  -     MO DISCHARGE MEDS RECONCILED W/ CURRENT OUTPATIENT MED LIST  3. Dilated cardiomyopathy (HCC)  4. Acute hyponatremia  5. Alcohol abuse  6. Abnormal LFTs (liver function tests)  7. Essential (primary) hypertension  8. SSS (sick sinus syndrome) ([facility])     She is in sinus rhythm to exam.  It seems like she has sick sinus syndrome.  Her heart rate is about 66 bpm.  I told her that in no uncertain terms she needs to use less alcohol.  I will start her back on amlodipine but at 2.5 mg a day and we will plan to see her back in about 4 weeks.  She has follow-up scheduled with a nephrologist concerning her recent hyponatremia which did improve but not back to normal prior to discharge.

## 2024-08-01 ENCOUNTER — OFFICE VISIT (OUTPATIENT)
Age: 74
End: 2024-08-01
Payer: MEDICARE

## 2024-08-01 VITALS
BODY MASS INDEX: 31.48 KG/M2 | SYSTOLIC BLOOD PRESSURE: 139 MMHG | WEIGHT: 201 LBS | HEART RATE: 88 BPM | OXYGEN SATURATION: 98 % | RESPIRATION RATE: 16 BRPM | DIASTOLIC BLOOD PRESSURE: 87 MMHG

## 2024-08-01 DIAGNOSIS — E87.1 ACUTE HYPONATREMIA: ICD-10-CM

## 2024-08-01 DIAGNOSIS — R79.89 ABNORMAL LFTS (LIVER FUNCTION TESTS): ICD-10-CM

## 2024-08-01 DIAGNOSIS — I10 ESSENTIAL (PRIMARY) HYPERTENSION: ICD-10-CM

## 2024-08-01 DIAGNOSIS — F31.9 BIPOLAR AFFECTIVE DISORDER, REMISSION STATUS UNSPECIFIED (HCC): ICD-10-CM

## 2024-08-01 DIAGNOSIS — I42.0 DILATED CARDIOMYOPATHY (HCC): ICD-10-CM

## 2024-08-01 DIAGNOSIS — F10.10 ALCOHOL ABUSE: ICD-10-CM

## 2024-08-01 DIAGNOSIS — I48.0 PAROXYSMAL ATRIAL FIBRILLATION (HCC): Primary | ICD-10-CM

## 2024-08-01 DIAGNOSIS — I48.0 PAROXYSMAL ATRIAL FIBRILLATION (HCC): ICD-10-CM

## 2024-08-01 DIAGNOSIS — I49.5 SSS (SICK SINUS SYNDROME) (HCC): ICD-10-CM

## 2024-08-01 LAB
ALBUMIN SERPL-MCNC: 3.9 G/DL (ref 3.5–5)
ALBUMIN/GLOB SERPL: 0.9 (ref 1.1–2.2)
ALP SERPL-CCNC: 148 U/L (ref 45–117)
ALT SERPL-CCNC: 26 U/L (ref 12–78)
ANION GAP SERPL CALC-SCNC: 7 MMOL/L (ref 5–15)
AST SERPL-CCNC: 43 U/L (ref 15–37)
BILIRUB SERPL-MCNC: 0.5 MG/DL (ref 0.2–1)
BUN SERPL-MCNC: 11 MG/DL (ref 6–20)
BUN/CREAT SERPL: 15 (ref 12–20)
CALCIUM SERPL-MCNC: 9.4 MG/DL (ref 8.5–10.1)
CHLORIDE SERPL-SCNC: 95 MMOL/L (ref 97–108)
CO2 SERPL-SCNC: 27 MMOL/L (ref 21–32)
CREAT SERPL-MCNC: 0.73 MG/DL (ref 0.55–1.02)
GLOBULIN SER CALC-MCNC: 4.5 G/DL (ref 2–4)
GLUCOSE SERPL-MCNC: 107 MG/DL (ref 65–100)
POTASSIUM SERPL-SCNC: 4.4 MMOL/L (ref 3.5–5.1)
PROT SERPL-MCNC: 8.4 G/DL (ref 6.4–8.2)
SODIUM SERPL-SCNC: 129 MMOL/L (ref 136–145)

## 2024-08-01 PROCEDURE — 3017F COLORECTAL CA SCREEN DOC REV: CPT | Performed by: SPECIALIST

## 2024-08-01 PROCEDURE — 99214 OFFICE O/P EST MOD 30 MIN: CPT | Performed by: SPECIALIST

## 2024-08-01 PROCEDURE — 1090F PRES/ABSN URINE INCON ASSESS: CPT | Performed by: SPECIALIST

## 2024-08-01 PROCEDURE — G8417 CALC BMI ABV UP PARAM F/U: HCPCS | Performed by: SPECIALIST

## 2024-08-01 PROCEDURE — 1036F TOBACCO NON-USER: CPT | Performed by: SPECIALIST

## 2024-08-01 PROCEDURE — 3079F DIAST BP 80-89 MM HG: CPT | Performed by: SPECIALIST

## 2024-08-01 PROCEDURE — 1123F ACP DISCUSS/DSCN MKR DOCD: CPT | Performed by: SPECIALIST

## 2024-08-01 PROCEDURE — 3075F SYST BP GE 130 - 139MM HG: CPT | Performed by: SPECIALIST

## 2024-08-01 PROCEDURE — G8400 PT W/DXA NO RESULTS DOC: HCPCS | Performed by: SPECIALIST

## 2024-08-01 PROCEDURE — G8427 DOCREV CUR MEDS BY ELIG CLIN: HCPCS | Performed by: SPECIALIST

## 2024-08-01 RX ORDER — AMIODARONE HYDROCHLORIDE 200 MG/1
200 TABLET ORAL DAILY
Qty: 30 TABLET | Refills: 5 | Status: SHIPPED | OUTPATIENT
Start: 2024-08-01

## 2024-08-01 NOTE — PROGRESS NOTES
HISTORY OF PRESENT ILLNESS  Darshana Kraus is a 74 y.o. female   She has a history of bipolar disorder and alcohol abuse and paroxysmal atrial fibrillation with reduced left ventricular function.  When she is in sinus rhythm her left ventricular function has returned to normal.  She was hospitalized earlier this year and put on amiodarone but then developed abnormal liver function test and it was stopped.  She also had bradycardia with the drug and with metoprolol.  She uses a device to check her heart and she has been back in atrial fibrillation for about 1 week.  She stopped drinking about a week ago but has had some falling spells.  She has had hyponatremia recently but no labs been checked for the past 1 or 2 months.  She cannot afford Eliquis.  HPI     Specialty Problems          Cardiology Problems    Dilated cardiomyopathy ([facility])        PAF (paroxysmal atrial fibrillation) ([facility])        Primary hypertension        A-fib ([facility])          Current Outpatient Medications   Medication Instructions    acetaminophen (TYLENOL 8 HOUR ARTHRITIS PAIN) 650 mg, EVERY 8 HOURS PRN    amiodarone (CORDARONE) 200 mg, Oral, DAILY    amLODIPine (NORVASC) 2.5 mg, Oral, DAILY    apixaban (ELIQUIS) 5 mg, Oral, 2 TIMES DAILY    ARIPiprazole (ABILIFY) 10 mg, Oral, DAILY    atorvastatin (LIPITOR) 20 mg, Oral, DAILY    clotrimazole-betamethasone (LOTRISONE) 1-0.05 % cream 2 TIMES DAILY    digoxin (LANOXIN) 125 mcg, Oral, DAILY    gabapentin (NEURONTIN) 300 mg, Oral, 3 TIMES DAILY    latanoprost (XALATAN) 0.005 % ophthalmic solution     levothyroxine (SYNTHROID) 75 mcg, Oral, DAILY BEFORE BREAKFAST    metFORMIN (GLUCOPHAGE) 500 mg, Oral, 2 TIMES DAILY WITH MEALS    metoprolol tartrate (LOPRESSOR) 25 mg, Oral, 2 TIMES DAILY    OXcarbazepine (TRILEPTAL) 300 mg, Oral, 2 TIMES DAILY    oxyBUTYnin (DITROPAN-XL) 5 mg, Oral, DAILY    Triprolidine-Pseudoephedrine (ANTIHISTAMINE PO) 25 mg, Oral, 2 times daily    venlafaxine (EFFEXOR XR) 37.5 mg, Oral, 
   Triprolidine-Pseudoephedrine (ANTIHISTAMINE PO) 25 mg, Oral, 2 times daily    venlafaxine (EFFEXOR XR) 37.5 mg, Oral, DAILY            Allergies   Allergen Reactions    Esomeprazole Magnesium Other (See Comments)    Hydrochlorothiazide Other (See Comments)       Severe hyponatremia causing seizures    Lisinopril Nausea And Vomiting      Past Medical History        Past Medical History:   Diagnosis Date    Asthma      Atrial fibrillation (HCC)      Bipolar 1 disorder (HCC)      Essential hypertension      Pneumonia           Past Surgical History         Past Surgical History:   Procedure Laterality Date    ORTHOPEDIC SURGERY             Family History   History reviewed. No pertinent family history.     Social History            Tobacco Use    Smoking status: Never    Smokeless tobacco: Never   Substance Use Topics    Alcohol use: Yes       Comment: occ         ROS      /87 (Site: Left Upper Arm, Position: Sitting, Cuff Size: Large Adult)   Pulse 88   Resp 16   Wt 91.2 kg (201 lb)   SpO2 98%   BMI 31.48 kg/m²    Physical Exam      ASSESSMENT and PLAN  1. Paroxysmal atrial fibrillation (HCC)  -     apixaban (ELIQUIS) 5 MG TABS tablet; Take 1 tablet by mouth 2 times daily, Disp-42 tablet, R-0Sample  -     Comprehensive Metabolic Panel; Future  2. Essential (primary) hypertension  3. Alcohol abuse  4. Abnormal LFTs (liver function tests)  5. Dilated cardiomyopathy (HCC)  6. SSS (sick sinus syndrome) (HCC)  7. Bipolar affective disorder, remission status unspecified (HCC)  8. Acute hyponatremia     Her physical exam could not be saved to this note but she is back in atrial fibrillation.  She represents a difficult management problem due to her mental illness and her noncompliance and inability to afford medication.  I will give her samples of Eliquis today.  I will restart her amiodarone 200 mg a day and increase her metoprolol to 50 mg twice daily and see her back in 1 to 2 weeks.

## 2024-08-02 NOTE — RESULT ENCOUNTER NOTE
Patient informed of results by mail     Future Appointments   Date Time Provider Department Center   8/22/2024 10:00 AM Yeison Barrett MD [facility] AMB   3/31/2025  8:00 AM Tammie Shanks ANP [facility] AMB

## 2024-08-22 ENCOUNTER — OFFICE VISIT (OUTPATIENT)
Age: 74
End: 2024-08-22
Payer: MEDICARE

## 2024-08-22 VITALS
OXYGEN SATURATION: 95 % | HEIGHT: 67 IN | SYSTOLIC BLOOD PRESSURE: 112 MMHG | HEART RATE: 71 BPM | BODY MASS INDEX: 31.08 KG/M2 | DIASTOLIC BLOOD PRESSURE: 64 MMHG | WEIGHT: 198 LBS

## 2024-08-22 DIAGNOSIS — R79.89 ABNORMAL LFTS (LIVER FUNCTION TESTS): ICD-10-CM

## 2024-08-22 DIAGNOSIS — I49.5 SSS (SICK SINUS SYNDROME) (HCC): ICD-10-CM

## 2024-08-22 DIAGNOSIS — E87.1 ACUTE HYPONATREMIA: ICD-10-CM

## 2024-08-22 DIAGNOSIS — F31.9 BIPOLAR AFFECTIVE DISORDER, REMISSION STATUS UNSPECIFIED (HCC): ICD-10-CM

## 2024-08-22 DIAGNOSIS — F10.10 ALCOHOL ABUSE: ICD-10-CM

## 2024-08-22 DIAGNOSIS — I48.0 PAROXYSMAL ATRIAL FIBRILLATION (HCC): Primary | ICD-10-CM

## 2024-08-22 DIAGNOSIS — I42.0 DILATED CARDIOMYOPATHY (HCC): ICD-10-CM

## 2024-08-22 DIAGNOSIS — I48.0 PAROXYSMAL ATRIAL FIBRILLATION (HCC): ICD-10-CM

## 2024-08-22 LAB
ANION GAP SERPL CALC-SCNC: 4 MMOL/L (ref 5–15)
BUN SERPL-MCNC: 13 MG/DL (ref 6–20)
BUN/CREAT SERPL: 15 (ref 12–20)
CALCIUM SERPL-MCNC: 9.3 MG/DL (ref 8.5–10.1)
CHLORIDE SERPL-SCNC: 99 MMOL/L (ref 97–108)
CO2 SERPL-SCNC: 29 MMOL/L (ref 21–32)
CREAT SERPL-MCNC: 0.85 MG/DL (ref 0.55–1.02)
GLUCOSE SERPL-MCNC: 95 MG/DL (ref 65–100)
POTASSIUM SERPL-SCNC: 4.7 MMOL/L (ref 3.5–5.1)
SODIUM SERPL-SCNC: 132 MMOL/L (ref 136–145)

## 2024-08-22 PROCEDURE — G8400 PT W/DXA NO RESULTS DOC: HCPCS | Performed by: SPECIALIST

## 2024-08-22 PROCEDURE — 3078F DIAST BP <80 MM HG: CPT | Performed by: SPECIALIST

## 2024-08-22 PROCEDURE — 3017F COLORECTAL CA SCREEN DOC REV: CPT | Performed by: SPECIALIST

## 2024-08-22 PROCEDURE — G8427 DOCREV CUR MEDS BY ELIG CLIN: HCPCS | Performed by: SPECIALIST

## 2024-08-22 PROCEDURE — 93005 ELECTROCARDIOGRAM TRACING: CPT | Performed by: SPECIALIST

## 2024-08-22 PROCEDURE — 99214 OFFICE O/P EST MOD 30 MIN: CPT | Performed by: SPECIALIST

## 2024-08-22 PROCEDURE — 93010 ELECTROCARDIOGRAM REPORT: CPT | Performed by: SPECIALIST

## 2024-08-22 PROCEDURE — 1036F TOBACCO NON-USER: CPT | Performed by: SPECIALIST

## 2024-08-22 PROCEDURE — 1123F ACP DISCUSS/DSCN MKR DOCD: CPT | Performed by: SPECIALIST

## 2024-08-22 PROCEDURE — G8417 CALC BMI ABV UP PARAM F/U: HCPCS | Performed by: SPECIALIST

## 2024-08-22 PROCEDURE — 1090F PRES/ABSN URINE INCON ASSESS: CPT | Performed by: SPECIALIST

## 2024-08-22 PROCEDURE — 3074F SYST BP LT 130 MM HG: CPT | Performed by: SPECIALIST

## 2024-08-22 RX ORDER — VENLAFAXINE HYDROCHLORIDE 75 MG/1
CAPSULE, EXTENDED RELEASE ORAL
COMMUNITY
Start: 2024-08-04

## 2024-08-22 RX ORDER — FOLIC ACID 1 MG/1
1 TABLET ORAL
COMMUNITY
Start: 2024-08-18

## 2024-08-22 ASSESSMENT — PATIENT HEALTH QUESTIONNAIRE - PHQ9
1. LITTLE INTEREST OR PLEASURE IN DOING THINGS: NOT AT ALL
SUM OF ALL RESPONSES TO PHQ9 QUESTIONS 1 & 2: 0
SUM OF ALL RESPONSES TO PHQ QUESTIONS 1-9: 0
SUM OF ALL RESPONSES TO PHQ QUESTIONS 1-9: 0
2. FEELING DOWN, DEPRESSED OR HOPELESS: NOT AT ALL
SUM OF ALL RESPONSES TO PHQ QUESTIONS 1-9: 0
SUM OF ALL RESPONSES TO PHQ QUESTIONS 1-9: 0

## 2024-08-22 NOTE — PATIENT INSTRUCTIONS
Repeat labs today downstairs.    We will call you to schedule LALO/cardioversion.    You have been scheduled to see the electrophysiologist to discuss and ablation and Watchman.

## 2024-08-22 NOTE — PROGRESS NOTES
HISTORY OF PRESENT ILLNESS  Darshana Kraus is a 74 y.o. female   She has issues with recurrent atrial fibrillation and bipolar disorder as well as alcohol abuse.  When I last saw her she was back in atrial fibrillation.  She had been on amiodarone previously and I restarted the medication in anticipation of either medical or electrical cardioversion.  In the meantime someone else gave her hydrochlorothiazide and she presented to [facility] hyponatremia and alcohol intoxication.  She was apparently there for 1 week.  She now complains of feeling tired.  She says she is not drinking anymore.  She is interested in ablation and Watchman device for her atrial fibrillation.  HPI     Specialty Problems          Cardiology Problems    Dilated cardiomyopathy ([facility])        PAF (paroxysmal atrial fibrillation) ([facility])        Primary hypertension        A-fib ([facility])          Current Outpatient Medications   Medication Instructions    acetaminophen (TYLENOL 8 HOUR ARTHRITIS PAIN) 650 mg, EVERY 8 HOURS PRN    amiodarone (CORDARONE) 200 mg, Oral, DAILY    amLODIPine (NORVASC) 2.5 mg, Oral, DAILY    apixaban (ELIQUIS) 5 mg, Oral, 2 TIMES DAILY    ARIPiprazole (ABILIFY) 10 mg, Oral, DAILY    atorvastatin (LIPITOR) 20 mg, Oral, DAILY    clotrimazole-betamethasone (LOTRISONE) 1-0.05 % cream 2 TIMES DAILY    digoxin (LANOXIN) 125 mcg, Oral, DAILY    folic acid (FOLVITE) 1 MG tablet 1 tablet, Oral, Every Day    gabapentin (NEURONTIN) 300 mg, Oral, 3 TIMES DAILY    latanoprost (XALATAN) 0.005 % ophthalmic solution     levothyroxine (SYNTHROID) 75 mcg, Oral, DAILY BEFORE BREAKFAST    metFORMIN (GLUCOPHAGE) 500 mg, Oral, 2 TIMES DAILY WITH MEALS    metoprolol tartrate (LOPRESSOR) 25 mg, Oral, 2 TIMES DAILY    OXcarbazepine (TRILEPTAL) 300 mg, Oral, 2 TIMES DAILY    oxyBUTYnin (DITROPAN-XL) 5 mg, Oral, DAILY    Triprolidine-Pseudoephedrine (ANTIHISTAMINE PO) 25 mg, Oral, 2 times daily    venlafaxine (EFFEXOR XR) 75 MG extended release

## 2024-08-23 ENCOUNTER — TELEPHONE (OUTPATIENT)
Age: 74
End: 2024-08-23

## 2024-08-23 NOTE — TELEPHONE ENCOUNTER
Left message for patient to return call to schedule procedure.    ----- Message from LIGIA CAMP RN sent at 8/22/2024 11:10 AM EDT -----  This young lady needs a LALO/CV with anesthesia per Dr. Barrett for afib    She will repeat BMP today    She may take her meds    I48.0    Thank you!

## 2024-08-28 ENCOUNTER — TELEPHONE (OUTPATIENT)
Age: 74
End: 2024-08-28

## 2024-08-28 NOTE — TELEPHONE ENCOUNTER
Lita Jang routed conversation to You16 minutes ago (11:58 AM)     Patient returned your call.

## 2024-08-28 NOTE — TELEPHONE ENCOUNTER
Spoke to patient and scheduled her for 9/4 with Dr. Love on 9/4 @ 9:30 with 8:00 am arrival. Instructions gone over with patient and advised labs already done, no medications to hold. Patient verbalized understanding and had no questions at the time of call.    Patient identification verified x2.           Patient Instructions    LALO (Transesophageal Echocardiogram)  Cardioversion  Pre-procedure instructions  The night before the procedure nothing to eat or drink after midnight, you may take approved medications the morning of the procedure with a few sips of water.  Medication restrictions: No medications to hold.  Labs: Already done.  [facility] Draw Sites:  [facility] [place]: [address] [facility] [address]  [place]: [address] [address] [address]  [place]: [address] [facility] [address]  [facility]: [address] [facility] [address]  [place]: [address] [facility] [address]  [facility]: [address] [facility] [address]  [place]: [address] [facility] [address]  [facility]: [address] [place] [address]    Procedure day  Have a  that will bring you and take you home  Have a responsible adult that can stay with you for 24hrs  Bring ID and insurance card  Wear comfortable clothing  Leave valuables at home, bring: dentures, hearing aids, or glasses  Bring overnight bag (just in case of an overnight stay)  Where to report  [facility]: go through main entrance and to the left is outpatient registration    Date of procedure: 9/4 w/ Dr. Love  Arrival Time: 8:00 am    Post procedure instructions  No driving for 24 hours    Contact  [facility]                                                           [address] [place] [address]

## 2024-09-04 ENCOUNTER — HOSPITAL ENCOUNTER (OUTPATIENT)
Facility: HOSPITAL | Age: 74
Discharge: HOME | End: 2024-09-06
Attending: INTERNAL MEDICINE
Payer: MEDICARE

## 2024-09-04 ENCOUNTER — TELEPHONE (OUTPATIENT)
Age: 74
End: 2024-09-04

## 2024-09-04 VITALS
HEART RATE: 55 BPM | OXYGEN SATURATION: 94 % | WEIGHT: 185 LBS | HEIGHT: 67 IN | DIASTOLIC BLOOD PRESSURE: 64 MMHG | RESPIRATION RATE: 13 BRPM | BODY MASS INDEX: 29.03 KG/M2 | SYSTOLIC BLOOD PRESSURE: 144 MMHG

## 2024-09-04 DIAGNOSIS — I48.0 PAROXYSMAL ATRIAL FIBRILLATION (HCC): ICD-10-CM

## 2024-09-04 LAB
EKG ATRIAL RATE: 55 BPM
EKG DIAGNOSIS: NORMAL
EKG P AXIS: 55 DEGREES
EKG P-R INTERVAL: 192 MS
EKG Q-T INTERVAL: 452 MS
EKG QRS DURATION: 112 MS
EKG QTC CALCULATION (BAZETT): 432 MS
EKG R AXIS: -42 DEGREES
EKG T AXIS: 89 DEGREES
EKG VENTRICULAR RATE: 55 BPM

## 2024-09-04 PROCEDURE — 93005 ELECTROCARDIOGRAM TRACING: CPT | Performed by: INTERNAL MEDICINE

## 2024-09-04 PROCEDURE — 6370000000 HC RX 637 (ALT 250 FOR IP): Performed by: INTERNAL MEDICINE

## 2024-09-04 RX ADMIN — APIXABAN 5 MG: 5 TABLET, FILM COATED ORAL at 08:49

## 2024-09-04 NOTE — PROGRESS NOTES
Pt arrives by w/c to noninvasive cardiology department accompanied by her boyfriend for LALO and possible direct current cardioversion procedure. All assessments completed and consent was reviewed.  Education given was regarding procedure, medications to be given, potential side effects of medications to be given, post-procedure management and follow-up. Opportunity for questions was provided and all questions and concerns were addressed. Patient and patient contact verbalized understanding of education.

## 2024-09-04 NOTE — DISCHARGE INSTRUCTIONS
Future Appointments   Date Time Provider Department Center   9/4/2024  9:30 AM [facility] STRESS ECHO LAB 1 [facility]   9/19/2024  2:20 PM Yeison Rose MD [facility] AMB   10/28/2024 10:00 AM Quoc Olguin MD [facility] AMB   3/31/2025  8:00 AM Tammie Shanks, ANP [facility] AMB     PLEASE HOLD YOUR DIGOXIN UNTIL DR. ROSE TELLS YOU OTHERWISE; FOLLOW UP WITH HIM AS INSTRUCTED ABOVE

## 2024-09-04 NOTE — PROGRESS NOTES
Patient confirmed to be in SB and SR; Dr. Love saw EKG; pt's VSS.  LALO with cardioversion cancelled per Dr. Love. Pt denies chest pain, SOB, and dizziness.  I contacted Dr. Barrett per Dr. Love's request and Dr. Barrett said for pt to stop taking her digoxin for now and to follow up with him on 09/19/24.  I relayed information to pt and to her boyfriend and I provided a copy of instructions for pt to take home.    Pt and boyfriend voiced understanding and denied any questions or need for clarification.      Transported with:  CHRIS Tavera RN via w/c to vehicle driven by her boyfriend

## 2024-09-04 NOTE — TELEPHONE ENCOUNTER
Cathie called from [facility]. Patient is there for scheduled LALO and CV, pt's rhythm is sinus fanny so no CV performed. She said Dr. Love asked her to call to find out if Dr. Barrett wanted to make any med changes before she goes home. I told her I will discuss with him when he arrives to the office and call her back.     Discussed with Dr. Barrett. He advised pt stop digoxin and follow up with him as scheduled 9/19. Called Cathie and notified her of Dr. Barrett's recommendation. She denied further questions or concerns.     Future Appointments   Date Time Provider Department Center   9/19/2024  2:20 PM Yeison Barrett MD [facility] AMB   10/28/2024 10:00 AM Quoc Olguin MD [facility] AMB   3/31/2025  8:00 AM Tammie Shanks ANP [facility] BS AMB

## 2024-09-19 ENCOUNTER — OFFICE VISIT (OUTPATIENT)
Age: 74
End: 2024-09-19
Payer: MEDICARE

## 2024-09-19 VITALS
DIASTOLIC BLOOD PRESSURE: 66 MMHG | OXYGEN SATURATION: 96 % | HEIGHT: 67 IN | BODY MASS INDEX: 29.7 KG/M2 | HEART RATE: 60 BPM | WEIGHT: 189.2 LBS | SYSTOLIC BLOOD PRESSURE: 138 MMHG

## 2024-09-19 DIAGNOSIS — F31.9 BIPOLAR AFFECTIVE DISORDER, REMISSION STATUS UNSPECIFIED (HCC): ICD-10-CM

## 2024-09-19 DIAGNOSIS — F10.10 ALCOHOL ABUSE: Primary | ICD-10-CM

## 2024-09-19 DIAGNOSIS — I42.0 DILATED CARDIOMYOPATHY (HCC): ICD-10-CM

## 2024-09-19 DIAGNOSIS — I48.0 PAROXYSMAL ATRIAL FIBRILLATION (HCC): ICD-10-CM

## 2024-09-19 DIAGNOSIS — R79.89 ABNORMAL LFTS (LIVER FUNCTION TESTS): ICD-10-CM

## 2024-09-19 PROCEDURE — 1036F TOBACCO NON-USER: CPT | Performed by: SPECIALIST

## 2024-09-19 PROCEDURE — 1123F ACP DISCUSS/DSCN MKR DOCD: CPT | Performed by: SPECIALIST

## 2024-09-19 PROCEDURE — 3017F COLORECTAL CA SCREEN DOC REV: CPT | Performed by: SPECIALIST

## 2024-09-19 PROCEDURE — 3078F DIAST BP <80 MM HG: CPT | Performed by: SPECIALIST

## 2024-09-19 PROCEDURE — 3075F SYST BP GE 130 - 139MM HG: CPT | Performed by: SPECIALIST

## 2024-09-19 PROCEDURE — 99214 OFFICE O/P EST MOD 30 MIN: CPT | Performed by: SPECIALIST

## 2024-09-19 PROCEDURE — G8427 DOCREV CUR MEDS BY ELIG CLIN: HCPCS | Performed by: SPECIALIST

## 2024-09-19 PROCEDURE — G8400 PT W/DXA NO RESULTS DOC: HCPCS | Performed by: SPECIALIST

## 2024-09-19 PROCEDURE — G8417 CALC BMI ABV UP PARAM F/U: HCPCS | Performed by: SPECIALIST

## 2024-09-19 PROCEDURE — 1090F PRES/ABSN URINE INCON ASSESS: CPT | Performed by: SPECIALIST

## 2024-09-19 RX ORDER — AMIODARONE HYDROCHLORIDE 200 MG/1
100 TABLET ORAL DAILY
Qty: 30 TABLET | Refills: 5
Start: 2024-09-19

## 2024-10-28 ENCOUNTER — OFFICE VISIT (OUTPATIENT)
Age: 74
End: 2024-10-28
Payer: MEDICARE

## 2024-10-28 VITALS
DIASTOLIC BLOOD PRESSURE: 82 MMHG | WEIGHT: 189.2 LBS | SYSTOLIC BLOOD PRESSURE: 108 MMHG | HEART RATE: 64 BPM | OXYGEN SATURATION: 100 % | BODY MASS INDEX: 29.7 KG/M2 | HEIGHT: 67 IN

## 2024-10-28 DIAGNOSIS — F31.9 BIPOLAR 1 DISORDER (HCC): ICD-10-CM

## 2024-10-28 DIAGNOSIS — I49.5 SICK SINUS SYNDROME (HCC): ICD-10-CM

## 2024-10-28 DIAGNOSIS — I48.19 PERSISTENT ATRIAL FIBRILLATION (HCC): Primary | ICD-10-CM

## 2024-10-28 DIAGNOSIS — I42.0 DILATED CARDIOMYOPATHY (HCC): ICD-10-CM

## 2024-10-28 DIAGNOSIS — I48.0 PAROXYSMAL ATRIAL FIBRILLATION (HCC): ICD-10-CM

## 2024-10-28 DIAGNOSIS — F10.10 ALCOHOL ABUSE: ICD-10-CM

## 2024-10-28 DIAGNOSIS — I49.5 SSS (SICK SINUS SYNDROME) (HCC): ICD-10-CM

## 2024-10-28 DIAGNOSIS — I10 PRIMARY HYPERTENSION: ICD-10-CM

## 2024-10-28 PROCEDURE — 99204 OFFICE O/P NEW MOD 45 MIN: CPT | Performed by: HOSPITALIST

## 2024-10-28 PROCEDURE — 93005 ELECTROCARDIOGRAM TRACING: CPT | Performed by: HOSPITALIST

## 2024-10-28 RX ORDER — WARFARIN SODIUM 5 MG/1
5 TABLET ORAL DAILY
Qty: 30 TABLET | Refills: 3 | Status: SHIPPED | OUTPATIENT
Start: 2024-10-28

## 2024-10-28 ASSESSMENT — PATIENT HEALTH QUESTIONNAIRE - PHQ9
2. FEELING DOWN, DEPRESSED OR HOPELESS: NOT AT ALL
SUM OF ALL RESPONSES TO PHQ QUESTIONS 1-9: 0
SUM OF ALL RESPONSES TO PHQ9 QUESTIONS 1 & 2: 0
SUM OF ALL RESPONSES TO PHQ QUESTIONS 1-9: 0
SUM OF ALL RESPONSES TO PHQ QUESTIONS 1-9: 0
1. LITTLE INTEREST OR PLEASURE IN DOING THINGS: NOT AT ALL
SUM OF ALL RESPONSES TO PHQ QUESTIONS 1-9: 0

## 2024-10-28 NOTE — PROGRESS NOTES
Cardiac Electrophysiology OFFICE Consultation Note     Subjective:      Darshana Kraus is a pleasant 74 y.o. female.  She is a resident of [facility] [address].    Primary Cardiologist: Yeison Barrett MD    She is retired. 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 She lives with her partner.    Darshana Kraus presents to electrophysiology clinic for management of Atrial Arrythmias.    Her current medical conditions and PMH are detailed below.    Today's visit:  Date: 10/28/2024     She has been referred for management of atrial fibrillation and consideration of watchman.  She has history of alcohol abuse and anemia she is also had falls.  She reports that she has had issues with taking Eliquis due to cost.    Currently on amiodarone and denies any palpitations.  Previously she has felt fatigued and generally not well in atrial fibrillation and she was initially diagnosed in 2022        Assessment:       ICD-10-CM    1. PAF (paroxysmal atrial fibrillation) ([facility])  I48.0 EKG 12 Lead     CBC with Auto Differential     Basic Metabolic Panel     [facility] - Medication Management (Anticoagulation) [facility]     CT CHEST WO CONTRAST      2. Primary hypertension  I10 EKG 12 Lead     CBC with Auto Differential     Basic Metabolic Panel     [facility] - Medication Management (Anticoagulation) [facility] [place]     CT CHEST WO CONTRAST      3. Dilated cardiomyopathy ([facility])  I42.0 EKG 12 Lead     CBC with Auto Differential     Basic Metabolic Panel     [facility] - Medication Management (Anticoagulation) [facility]     CT CHEST WO CONTRAST      4. SSS (sick sinus syndrome) ([facility])  I49.5 EKG 12 Lead     CBC with Auto Differential     Basic Metabolic Panel     [facility] - Medication Management (Anticoagulation) [facility]     CT CHEST WO CONTRAST            EKG reviewed from April 9, 2024 shows atrial fibrillation with RVR, ventricular rate 142 bpm  EKG reviewed from 8/22/2024 shows atrial fibrillation with controlled ventricular

## 2024-10-28 NOTE — PATIENT INSTRUCTIONS
--Start Warfarin 5 mg daily  --Amiodarone monitoring    --Cardiac CT  --Schedule for AF ablation + Watchman  --Stop amiodarone 2 weeks prior to procedure    -Anticoagulation: continue anticoagulation with warfarin/Coumadin    -Rate control: Continue Metoprolol Tartrate 25 mg bid   -Rhythm control: Continue Amiodarone 200 mg daily    -Lifestyle recommendations:     -Recommend abstaining from all alcohol use   -Light to moderate caffeine intake is acceptable.  Please make sure to have less than 200 mg of caffeine daily.   -Recommend regular aerobic exercise at least 3-5 days per week      Your afib ablation + watchman procedure has been scheduled for 1/31/25 at 230pm, at [facility].    Please report to Admitting Department by 1230pm, or 2 hours prior to your scheduled procedure. Please bring a list of your current medications and medication bottles, if able, to the hospital on this day.  You will be unable to drive after your procedure so please make sure to bring someone with you to your procedure.    You will need to have nothing to eat or drink after midnight, the night prior to your procedure. You may have small sips of water, if needed, to take with your medication.    You will need labs drawn prior to your procedure. Please go to have this done no sooner than 1/10/25 and no later than 1/25/25.      You will be scheduled for a Chest CTA to map out your pulmonary veins. Please call central scheduling at [phone] to schedule your test.    You should stop your medication, amiodarone 2 weeks prior to procedure. DO NOT hold warfarin    After your procedure, you will need to follow up with Dr Olguin. Your follow-up appointment has been scheduled for 2/24/25 at 10:20am.

## 2024-10-31 NOTE — PROGRESS NOTES
Pharmacy Progress Note - Anticoagulation Management    S/O:  Ms. Darshana Kraus  is a 74 y.o. female seen today for anticoagulation management for the diagnosis of Atrial Fibrillation.     HPI: Referral received 10/28 from Dr. Quoc Olguin MD for warfarin management.  Patient is switching from Eliquis to warfarin due to cost.  Patient was contacted and scheduled INR at [facility] clinic on 11/8.    Interim History:    Warfarin start date: 11/2/24 (previously on Eliquis, cost prohibitive)  INR Goal:  2.0-3.0    Current warfarin regimen: 5 mg daily                   Warfarin tablet strength:   5 mg   Duration of therapy: Indefinite    Today's pertinent positives includes:  Medication change    Patient reports she stopped Eliquis and started warfarin 5 mg daily on 11/2.    INR 4.8  PT 58.0    Adherence:   Able to recall regimen? YES  Miss/extra dose? NO  Need refill? NO    Upcoming procedure(s):  NO    INR History:   (normal INR range 0.8-1.2)     Date   INR   PT   Dose/Comments  11/08/24 4.8  58.0 Started 5 mg daily on 11/2      A/P:       Anticoagulation:  Considering Ms. Kraus's past history, todays findings, and per Anticoagulation Collaborative Practice Agreement/Protocol:    Fingerstick POC INR (4.8) is Supratherapeutic for INR goal today.   Change warfarin plan and hold dose tonight (11/8) and tomorrow (11/9).  On 11/10 reduce to take 5 mg Mon, Wed, Fri; 2.5 mg daily ROW  INR is 4.8 and supratherapeutic for INR goal 2.0-3.0.  Patient reports starting warfarin 5 mg daily on 11/2.  Patient has switched from Eliquis due to cost.  In visit today, we reviewed importance of taking warfarin at the same time each day and avoiding unintentional missed or extra doses.  We also reviewed the effect of medication and dietary changes on INR.  Patient recommended to keep intake of vitamin K foods consistent week to week.  We also discussed the effect of alcohol on INR.  Patient confirmed understanding.  Due to degree of INR elevation,

## 2024-10-31 NOTE — PATIENT INSTRUCTIONS
Today your INR was 4.8.      Your goal INR is  2.0-3.0.    You have a   5 mg tablet of Coumadin (warfarin).   Take Coumadin (warfarin) as follows:    Hold warfarin dose tonight (11/8) and tomorrow (11/9).  Starting 11/10 take 5 mg (1 tab) Monday, Wednesday, Friday and 2.5 mg (0.5 tab) daily rest of week.    Come back in 3 to 5 day(s) for your next finger stick/INR blood test.        Avoid any over the counter items containing aspirin or ibuprofen, and avoid great swings in general diet.  Avoid alcohol consumption.  Please notify the INR nurse if you are started on any new medication including over the counter or herbal supplements. Also, please notify your INR nurse if any of your other prescription or over the counter medications have been discontinued.     Your Care Instructions  Warfarin is a medicine that you take to prevent blood clots. It is often called a blood thinner. Doctors give warfarin (such as Coumadin) to reduce the risk of blood clots. You may be at risk for blood clots if you have atrial fibrillation or deep vein thrombosis. Some other health problems may also put you at risk.  Warfarin slows the amount of time it takes for your blood to clot. It can cause bleeding problems. Even if you've been taking warfarin for a while, it's important to know how to take it safely.  Foods and other medicines can affect the way warfarin works. Some can make warfarin work too well. This can cause bleeding problems. And some can make it work poorly, so that it does not prevent blood clots very well.  You will need regular blood tests to check how long it takes for your blood to form a clot. This test is called a PT or prothrombin time test. The result of the test is called an INR level. Depending on the test results, your doctor or anticoagulation clinic may adjust your dose of warfarin.    Follow-up care is a key part of your treatment and safety. Be sure to make and go to all appointments, and call your doctor if

## 2024-11-08 ENCOUNTER — ANTI-COAG VISIT (OUTPATIENT)
Facility: HOSPITAL | Age: 74
End: 2024-11-08
Payer: MEDICARE

## 2024-11-08 DIAGNOSIS — I48.0 PAROXYSMAL ATRIAL FIBRILLATION (HCC): Primary | ICD-10-CM

## 2024-11-08 LAB
INTERNATIONAL NORMALIZATION RATIO, POC: 4.8
PROTHROMBIN TIME, POC: 0

## 2024-11-08 PROCEDURE — 85610 PROTHROMBIN TIME: CPT

## 2024-11-08 PROCEDURE — 99202 OFFICE O/P NEW SF 15 MIN: CPT

## 2024-11-09 LAB
BASOPHILS # BLD AUTO: 0 X10E3/UL (ref 0–0.2)
BASOPHILS NFR BLD AUTO: 1 %
BUN SERPL-MCNC: 9 MG/DL (ref 8–27)
BUN/CREAT SERPL: 13 (ref 12–28)
CALCIUM SERPL-MCNC: 9.6 MG/DL (ref 8.7–10.3)
CHLORIDE SERPL-SCNC: 95 MMOL/L (ref 96–106)
CO2 SERPL-SCNC: 22 MMOL/L (ref 20–29)
CREAT SERPL-MCNC: 0.72 MG/DL (ref 0.57–1)
EGFRCR SERPLBLD CKD-EPI 2021: 89 ML/MIN/1.73
EOSINOPHIL # BLD AUTO: 0.2 X10E3/UL (ref 0–0.4)
EOSINOPHIL NFR BLD AUTO: 5 %
ERYTHROCYTE [DISTWIDTH] IN BLOOD BY AUTOMATED COUNT: 15.1 % (ref 11.7–15.4)
GLUCOSE SERPL-MCNC: 97 MG/DL (ref 70–99)
HCT VFR BLD AUTO: 31.7 % (ref 34–46.6)
HGB BLD-MCNC: 10.5 G/DL (ref 11.1–15.9)
IMM GRANULOCYTES # BLD AUTO: 0 X10E3/UL (ref 0–0.1)
IMM GRANULOCYTES NFR BLD AUTO: 0 %
LYMPHOCYTES # BLD AUTO: 0.9 X10E3/UL (ref 0.7–3.1)
LYMPHOCYTES NFR BLD AUTO: 22 %
MCH RBC QN AUTO: 30.5 PG (ref 26.6–33)
MCHC RBC AUTO-ENTMCNC: 33.1 G/DL (ref 31.5–35.7)
MCV RBC AUTO: 92 FL (ref 79–97)
MONOCYTES # BLD AUTO: 0.5 X10E3/UL (ref 0.1–0.9)
MONOCYTES NFR BLD AUTO: 12 %
NEUTROPHILS # BLD AUTO: 2.6 X10E3/UL (ref 1.4–7)
NEUTROPHILS NFR BLD AUTO: 60 %
PLATELET # BLD AUTO: 315 X10E3/UL (ref 150–450)
POTASSIUM SERPL-SCNC: 4.6 MMOL/L (ref 3.5–5.2)
RBC # BLD AUTO: 3.44 X10E6/UL (ref 3.77–5.28)
SODIUM SERPL-SCNC: 131 MMOL/L (ref 134–144)
WBC # BLD AUTO: 4.2 X10E3/UL (ref 3.4–10.8)

## 2024-11-12 ENCOUNTER — TELEPHONE (OUTPATIENT)
Facility: HOSPITAL | Age: 74
End: 2024-11-12

## 2024-11-12 ENCOUNTER — TELEPHONE (OUTPATIENT)
Age: 74
End: 2024-11-12

## 2024-11-12 DIAGNOSIS — I42.0 DILATED CARDIOMYOPATHY (HCC): ICD-10-CM

## 2024-11-12 DIAGNOSIS — I48.0 PAROXYSMAL ATRIAL FIBRILLATION (HCC): ICD-10-CM

## 2024-11-12 DIAGNOSIS — I49.5 SICK SINUS SYNDROME (HCC): Primary | ICD-10-CM

## 2024-11-12 NOTE — TELEPHONE ENCOUNTER
Medication Management Clinic - Schedule Appointment    [facility] received notification from [place] stating patient cancelled her upcoming INR check for Friday (11/15) and she is requesting to change to [facility] for anticoagulation services. [facility] received a referral for patient. Contacted patient and her INR check appointment has been scheduled for Thursday, November 21st. Provided patient with directions to the clinic. Patient verbalized understanding.    Thank you.  Sakshi Chino, PharmD    For Pharmacy Admin Tracking Only    Program: Medication Management  CPA in place:  Yes  Recommendation Provided To: Patient/Caregiver: 1 via Telephone  Intervention Detail: Scheduled Appointment  Intervention Accepted By: Patient/Caregiver: 1  Time Spent (min): 15

## 2024-11-12 NOTE — TELEPHONE ENCOUNTER
VO placed per Dr Olguin to change pts warfarin clinic closer to her place of residence - informed by warfarin clinic.

## 2024-11-21 ENCOUNTER — ANTI-COAG VISIT (OUTPATIENT)
Facility: HOSPITAL | Age: 74
End: 2024-11-21
Payer: MEDICARE

## 2024-11-21 DIAGNOSIS — I48.0 PAROXYSMAL ATRIAL FIBRILLATION (HCC): Primary | ICD-10-CM

## 2024-11-21 LAB
INTERNATIONAL NORMALIZATION RATIO, POC: 5.5 (ref 2–3)
PROTHROMBIN TIME, POC: 66.5

## 2024-11-21 NOTE — PROGRESS NOTES
Pharmacy Progress Note - Anticoagulation Management    S/O:  Ms. Darshana Kraus  is a 74 y.o. female seen today for anticoagulation management for the diagnosis of Atrial Fibrillation.     HPI: At last visit (11/8) INR was 4.8 and supratherapeutic for INR goal 2.0-3.0. Due to degree of INR elevation, patient recommended to hold warfarin dose on 11/8 and 11/9.  Over the previous 7 days, patient had taken 30 mg of warfarin. Weekly planned dose was reduced to 25 mg (17%). Starting 11/10 patient recommended to take 5 mg Mon, Wed, Fri; 2.5 mg daily ROW and recheck INR in 5 days.  Patient scheduled follow up INR on 11/15.    Interim History:    Warfarin start date: 11/2/24 (previously on Eliquis, cost prohibitive)  INR Goal: 2.0-3.0    Current warfarin regimen: Held dose x 2 days then 5 mg Mon, Wed, Fri; 2.5 mg daily ROW                  Warfarin tablet strength: 5 mg   Duration of therapy: Indefinite    Results for orders placed or performed in visit on 11/21/24   POCT INR   Result Value Ref Range    Prothrombin time,(POC) 66.5     INR,(POC) 5.5 (A) 2.0 - 3.0     Today's pertinent positives includes:  No significant changes since last visit    Adherence:   Able to recall regimen? YES (patient uses the dosing calendar as a reference. She brought that paper to this appointment)   Miss/extra dose? NO  Need refill? NO    Upcoming procedure(s):  NO    Patient Findings       Negatives:  Signs/symptoms of thrombosis, Signs/symptoms of bleeding, Laboratory test error suspected, Change in health, Change in alcohol use, Change in activity, Upcoming invasive procedure, Emergency department visit, Upcoming dental procedure, Missed doses, Extra doses, Change in medications, Change in diet/appetite, Hospital admission, Bruising, Other complaints          Past Medical History:   Diagnosis Date    Asthma     Atrial fibrillation (HCC)     Bipolar 1 disorder (HCC)     Essential hypertension     Pneumonia      Allergies   Allergen Reactions

## 2024-11-21 NOTE — PATIENT INSTRUCTIONS
Today your INR was 5.5.      Your goal INR is 2.0-3.0.    You have a  5 mg tablet of Coumadin (warfarin).   Take Coumadin (warfarin) as follows:    Hold warfarin 11/21, 11/22, 11/23; then 5mg every Monday, Wednesday, 2.5mg all other days    Come back in 1 week(s) for your next finger stick/INR blood test.        Avoid any over the counter items containing aspirin or ibuprofen, and avoid great swings in general diet.  Avoid alcohol consumption.  Please notify the INR nurse if you are started on any new medication including over the counter or herbal supplements. Also, please notify your INR nurse if any of your other prescription or over the counter medications have been discontinued.     Your Care Instructions  Warfarin is a medicine that you take to prevent blood clots. It is often called a blood thinner. Doctors give warfarin (such as Coumadin) to reduce the risk of blood clots. You may be at risk for blood clots if you have atrial fibrillation or deep vein thrombosis. Some other health problems may also put you at risk.  Warfarin slows the amount of time it takes for your blood to clot. It can cause bleeding problems. Even if you've been taking warfarin for a while, it's important to know how to take it safely.  Foods and other medicines can affect the way warfarin works. Some can make warfarin work too well. This can cause bleeding problems. And some can make it work poorly, so that it does not prevent blood clots very well.  You will need regular blood tests to check how long it takes for your blood to form a clot. This test is called a PT or prothrombin time test. The result of the test is called an INR level. Depending on the test results, your doctor or anticoagulation clinic may adjust your dose of warfarin.    Follow-up care is a key part of your treatment and safety. Be sure to make and go to all appointments, and call your doctor if you are having problems. It's also a good idea to know your test results

## 2024-11-22 PROCEDURE — 99212 OFFICE O/P EST SF 10 MIN: CPT

## 2024-11-25 ENCOUNTER — ANTI-COAG VISIT (OUTPATIENT)
Facility: HOSPITAL | Age: 74
End: 2024-11-25
Payer: MEDICARE

## 2024-11-25 DIAGNOSIS — I48.0 PAROXYSMAL ATRIAL FIBRILLATION (HCC): Primary | ICD-10-CM

## 2024-11-25 LAB
INTERNATIONAL NORMALIZATION RATIO, POC: 1.9 (ref 2–3)
PROTHROMBIN TIME, POC: 23.1

## 2024-11-25 PROCEDURE — 99211 OFF/OP EST MAY X REQ PHY/QHP: CPT

## 2024-11-25 NOTE — PATIENT INSTRUCTIONS
Today your INR was 1.9.      Your goal INR is 2.0-3.0.    You have a 5 mg tablet of Coumadin (warfarin).   Take Coumadin (warfarin) as follows:    5mg every Monday, Wednesday, 2.5mg all other days    Come back in 1.5 week(s) for your next finger stick/INR blood test.        Avoid any over the counter items containing aspirin or ibuprofen, and avoid great swings in general diet.  Avoid alcohol consumption.  Please notify the INR nurse if you are started on any new medication including over the counter or herbal supplements. Also, please notify your INR nurse if any of your other prescription or over the counter medications have been discontinued.     Your Care Instructions  Warfarin is a medicine that you take to prevent blood clots. It is often called a blood thinner. Doctors give warfarin (such as Coumadin) to reduce the risk of blood clots. You may be at risk for blood clots if you have atrial fibrillation or deep vein thrombosis. Some other health problems may also put you at risk.  Warfarin slows the amount of time it takes for your blood to clot. It can cause bleeding problems. Even if you've been taking warfarin for a while, it's important to know how to take it safely.  Foods and other medicines can affect the way warfarin works. Some can make warfarin work too well. This can cause bleeding problems. And some can make it work poorly, so that it does not prevent blood clots very well.  You will need regular blood tests to check how long it takes for your blood to form a clot. This test is called a PT or prothrombin time test. The result of the test is called an INR level. Depending on the test results, your doctor or anticoagulation clinic may adjust your dose of warfarin.    Follow-up care is a key part of your treatment and safety. Be sure to make and go to all appointments, and call your doctor if you are having problems. It's also a good idea to know your test results and keep a list of the medicines you

## 2024-11-25 NOTE — PROGRESS NOTES
Pharmacy Progress Note - Anticoagulation Management    S/O:  Ms. Darshana Kraus  is a 74 y.o. female seen today for anticoagulation management for the diagnosis of Atrial Fibrillation.     HPI: At last visit (11/21), patient's INR is 5.5 and supratherapeutic for an INR goal of 2.0-3.0. Patient denies any extra doses of warfarin and denies changes to her medications. Patient reports consistent intake of vitamin K foods. Patient denies bruising/bleeding and was instructed to monitor for any signs and symptoms of internal bleeds and to seek emergency medical support right away if it ever occurs. Will hold 3 doses of warfarin on 11/21, 11/22, 11/23 then will reduce weekly warfarin dose from 25mg to 22.5mg (~ 10%) and patient will take warfarin 5mg every Monday Wednesday, 2.5mg all other days. Patient will recheck INR in 1 week(s).    Interim History:    Warfarin start date: 11/2/24 (previously on Eliquis, cost prohibitive)  INR Goal: 2.0-3.0    Current warfarin regimen: Hold warfarin 11/21, 11/22, 11/23; then 5mg every Monday, Wednesday, 2.5mg all other days      Warfarin tablet strength: 5 mg   Duration of therapy: Indefinite      Results for orders placed or performed in visit on 11/25/24   POCT INR   Result Value Ref Range    Prothrombin time,(POC) 23.1     INR,(POC) 1.9 (A) 2.0 - 3.0       Today's pertinent positives includes:  No significant changes since last visit    Adherence:   Able to recall regimen? YES (patient uses the dosing calendar as a reference. She brought that paper to this appointment)   Miss/extra dose? NO  Need refill? NO    Upcoming procedure(s):  NO      Patient Findings       Negatives:  Signs/symptoms of thrombosis, Signs/symptoms of bleeding, Laboratory test error suspected, Change in health, Change in alcohol use, Change in activity, Upcoming invasive procedure, Emergency department visit, Upcoming dental procedure, Missed doses, Extra doses, Change in medications, Change in diet/appetite,

## 2024-11-26 ENCOUNTER — OFFICE VISIT (OUTPATIENT)
Age: 74
End: 2024-11-26
Payer: MEDICARE

## 2024-11-26 VITALS
WEIGHT: 187 LBS | HEIGHT: 67 IN | SYSTOLIC BLOOD PRESSURE: 140 MMHG | OXYGEN SATURATION: 99 % | HEART RATE: 54 BPM | DIASTOLIC BLOOD PRESSURE: 70 MMHG | BODY MASS INDEX: 29.35 KG/M2

## 2024-11-26 DIAGNOSIS — F10.10 ALCOHOL ABUSE: ICD-10-CM

## 2024-11-26 DIAGNOSIS — I42.0 DILATED CARDIOMYOPATHY (HCC): ICD-10-CM

## 2024-11-26 DIAGNOSIS — I10 ESSENTIAL (PRIMARY) HYPERTENSION: ICD-10-CM

## 2024-11-26 DIAGNOSIS — I49.5 SICK SINUS SYNDROME (HCC): ICD-10-CM

## 2024-11-26 DIAGNOSIS — F31.9 BIPOLAR AFFECTIVE DISORDER, REMISSION STATUS UNSPECIFIED (HCC): ICD-10-CM

## 2024-11-26 DIAGNOSIS — R79.89 ABNORMAL LFTS (LIVER FUNCTION TESTS): ICD-10-CM

## 2024-11-26 DIAGNOSIS — I48.0 PAROXYSMAL ATRIAL FIBRILLATION (HCC): Primary | ICD-10-CM

## 2024-11-26 PROCEDURE — 1123F ACP DISCUSS/DSCN MKR DOCD: CPT | Performed by: SPECIALIST

## 2024-11-26 PROCEDURE — G8400 PT W/DXA NO RESULTS DOC: HCPCS | Performed by: SPECIALIST

## 2024-11-26 PROCEDURE — 1090F PRES/ABSN URINE INCON ASSESS: CPT | Performed by: SPECIALIST

## 2024-11-26 PROCEDURE — G8417 CALC BMI ABV UP PARAM F/U: HCPCS | Performed by: SPECIALIST

## 2024-11-26 PROCEDURE — G8484 FLU IMMUNIZE NO ADMIN: HCPCS | Performed by: SPECIALIST

## 2024-11-26 PROCEDURE — 3017F COLORECTAL CA SCREEN DOC REV: CPT | Performed by: SPECIALIST

## 2024-11-26 PROCEDURE — G8427 DOCREV CUR MEDS BY ELIG CLIN: HCPCS | Performed by: SPECIALIST

## 2024-11-26 PROCEDURE — 99214 OFFICE O/P EST MOD 30 MIN: CPT | Performed by: SPECIALIST

## 2024-11-26 PROCEDURE — 3078F DIAST BP <80 MM HG: CPT | Performed by: SPECIALIST

## 2024-11-26 PROCEDURE — 3077F SYST BP >= 140 MM HG: CPT | Performed by: SPECIALIST

## 2024-11-26 PROCEDURE — 1036F TOBACCO NON-USER: CPT | Performed by: SPECIALIST

## 2024-11-26 PROCEDURE — 1160F RVW MEDS BY RX/DR IN RCRD: CPT | Performed by: SPECIALIST

## 2024-11-26 PROCEDURE — 1159F MED LIST DOCD IN RCRD: CPT | Performed by: SPECIALIST

## 2024-11-26 PROCEDURE — 1126F AMNT PAIN NOTED NONE PRSNT: CPT | Performed by: SPECIALIST

## 2024-11-26 ASSESSMENT — PATIENT HEALTH QUESTIONNAIRE - PHQ9
1. LITTLE INTEREST OR PLEASURE IN DOING THINGS: NOT AT ALL
SUM OF ALL RESPONSES TO PHQ QUESTIONS 1-9: 0
SUM OF ALL RESPONSES TO PHQ9 QUESTIONS 1 & 2: 0
SUM OF ALL RESPONSES TO PHQ QUESTIONS 1-9: 0
2. FEELING DOWN, DEPRESSED OR HOPELESS: NOT AT ALL

## 2024-11-26 NOTE — PROGRESS NOTES
capsule     venlafaxine (EFFEXOR XR) 37.5 mg, Oral, DAILY    warfarin (COUMADIN) 5 mg, Oral, DAILY      Allergies   Allergen Reactions    Nexium [Esomeprazole] Anaphylaxis    Esomeprazole Magnesium Other (See Comments)    Hydrochlorothiazide Other (See Comments)     Severe hyponatremia causing seizures    Lisinopril Nausea And Vomiting     Past Medical History:   Diagnosis Date    Asthma     Atrial fibrillation (HCC)     Bipolar 1 disorder (HCC)     Essential hypertension     Pneumonia      Past Surgical History:   Procedure Laterality Date    ORTHOPEDIC SURGERY       History reviewed. No pertinent family history.  Social History     Tobacco Use    Smoking status: Never    Smokeless tobacco: Never   Substance Use Topics    Alcohol use: Not Currently     Comment: occ       Review of Systems   Constitutional: Positive for weight loss.   All other systems reviewed and are negative.       BP (!) 140/70   Pulse 54   Ht 1.702 m (5' 7.01\")   Wt 84.8 kg (187 lb)   SpO2 99%   BMI 29.28 kg/m²    Physical Exam  Vitals and nursing note reviewed.   Constitutional:       Appearance: Normal appearance.   HENT:      Head: Normocephalic.      Right Ear: External ear normal.      Left Ear: External ear normal.      Nose: Nose normal.      Mouth/Throat:      Mouth: Mucous membranes are moist.   Eyes:      Extraocular Movements: Extraocular movements intact.   Cardiovascular:      Rate and Rhythm: Regular rhythm. Bradycardia present.      Heart sounds: Normal heart sounds.   Pulmonary:      Breath sounds: Normal breath sounds.   Abdominal:      Palpations: Abdomen is soft.   Musculoskeletal:         General: Normal range of motion.      Cervical back: Normal range of motion.   Skin:     General: Skin is warm.   Neurological:      Mental Status: She is alert and oriented to person, place, and time.   Psychiatric:         Behavior: Behavior normal.          ASSESSMENT and PLAN  1. Paroxysmal atrial fibrillation (HCC)  2. Sick sinus

## 2024-12-04 ENCOUNTER — ANTI-COAG VISIT (OUTPATIENT)
Facility: HOSPITAL | Age: 74
End: 2024-12-04
Payer: MEDICARE

## 2024-12-04 DIAGNOSIS — I48.0 PAROXYSMAL ATRIAL FIBRILLATION (HCC): Primary | ICD-10-CM

## 2024-12-04 LAB
INTERNATIONAL NORMALIZATION RATIO, POC: 3.4 (ref 2–3)
PROTHROMBIN TIME, POC: 40.3

## 2024-12-04 PROCEDURE — 85610 PROTHROMBIN TIME: CPT

## 2024-12-04 PROCEDURE — 99213 OFFICE O/P EST LOW 20 MIN: CPT

## 2024-12-04 RX ORDER — WARFARIN SODIUM 2.5 MG/1
TABLET ORAL
Qty: 30 TABLET | Refills: 0 | Status: SHIPPED | OUTPATIENT
Start: 2024-12-04

## 2024-12-04 NOTE — PROGRESS NOTES
PT   Dose/Comments  12/04/24 3.4  40.3 Pt taking 2.5 mg daily instead of the instructed regimen of 5 mg Mon, Wed; 2.5 mg daily ROW  11/25/24 1.9                   23.1     Hold doses 11/21, 11/22, 11/23. Starting 11/24 take 5 mg Mon, Wed, 2.5 mg daily ROW  11/15/24          5.5                   66.5     Held dose x 2 days then 5 mg MWF; 2.5 mg daily ROW  11/08/24          4.8                   58.0     Started 5 mg daily on 11/2      Medications that can increase  INR: levothyroxine, venlafaxine  Medications that can decrease INR: metformin    A/P:       Anticoagulation:  Considering Ms. Kraus's past history, todays findings, and per Anticoagulation Collaborative Practice Agreement/Protocol:    Fingerstick POC INR (3.4) is Supratherapeutic for INR goal today.   Change warfarin to 1.25 mg Wed; 2.5 mg daily ROW  Patient's INR is 3.4 and supratherapeutic for an INR goal of 2.0-3.0. Patient denies any extra doses of warfarin and denies changes to her medications. Patient reports consistent intake of vitamin K foods. Patient reports taking warfarin 2.5 mg daily instead of the instructed warfarin regimen of 5 mg Mon, Wed; 2.5 mg daily ROW. Over the past 7 days, patient has taken 17.5 mg of warfarin. Will reduce weekly warfarin dose from 17.5 mg to 16.25 mg (~ 7%) and patient will take warfarin 1.25 mg Wed; 2.5 mg daily ROW. Patient will recheck INR in 2 week(s). E-prescribed a new prescription for warfarin 2.5 mg tablets to [facility]. Patient was informed not to use the warfarin 5 mg tablets and to place in a different location from her other medications. Patient reports she will write on the warfarin 5 mg tablet bottle \"Do not use.\"      Patient was instructed to schedule an appointment in 2 week(s) prior to leaving the clinic.    Medication reconciliation was completed during the visit.    There are no discontinued medications.    A full discussion of the nature of anticoagulants has been carried out.  A full

## 2024-12-04 NOTE — PATIENT INSTRUCTIONS
Today your INR was 3.4.      Your goal INR is  2.0-3.0 .    You have a   2.5 mg tablet of Coumadin (warfarin).   Take Coumadin (warfarin) as follows:    Take 1.25 mg every Wednesday; and 2.5 mg all other days of the week.    Come back in 2 week(s) for your next finger stick/INR blood test.        Avoid any over the counter items containing aspirin or ibuprofen, and avoid great swings in general diet.  Avoid alcohol consumption.  Please notify the INR pharmacist if you are started on any new medication including over the counter or herbal supplements. Also, please notify your INR pharmacist if any of your other prescription or over the counter medications have been discontinued.     Call [facility] at [phone] if you have any signs of abnormal bleeding/blood clot.  ------------------------------------------------------------------------------------------------------------------  Taking Warfarin Safely: Care Instructions    Your Care Instructions  Warfarin is a medicine that you take to prevent blood clots. It is often called a blood thinner. Doctors give warfarin (such as Coumadin) to reduce the risk of blood clots. You may be at risk for blood clots if you have atrial fibrillation or deep vein thrombosis. Some other health problems may also put you at risk.  Warfarin slows the amount of time it takes for your blood to clot. It can cause bleeding problems. Even if you've been taking warfarin for a while, it's important to know how to take it safely.  Foods and other medicines can affect the way warfarin works. Some can make warfarin work too well. This can cause bleeding problems. And some can make it work poorly, so that it does not prevent blood clots very well.  You will need regular blood tests to check how long it takes for your blood to form a clot. This test is called a PT or prothrombin time test. The result of the test is called an INR level. Depending

## 2024-12-07 NOTE — PROGRESS NOTES
Pharmacy Progress Note - Anticoagulation Management    S/O:  Ms. Darshana Kraus  is a 74 y.o. female seen today for anticoagulation management for the diagnosis of Atrial Fibrillation.     HPI: At last visit (12/4), patient's INR was 3.4 and supratherapeutic for an INR goal of 2.0-3.0. Patient denied any extra doses of warfarin and denied changes to her medications. Patient reported consistent intake of vitamin K foods. Patient reported taking warfarin 2.5 mg daily instead of the instructed warfarin regimen of 5 mg Mon, Wed; 2.5 mg daily ROW. Over the past 7 days, patient has taken 17.5 mg of warfarin. Will reduce weekly warfarin dose from 17.5 mg to 16.25 mg (~ 7%) and patient will take warfarin 1.25 mg Wed; 2.5 mg daily ROW. Patient will recheck INR in 2 week(s). E-prescribed a new prescription for warfarin 2.5 mg tablets to [facility]. Patient was informed not to use the warfarin 5 mg tablets and to place in a different location from her other medications. Patient reports she will write on the warfarin 5 mg tablet bottle \"Do not use.\"    Interim History:    Warfarin start date: 11/2/24 (previously on Eliquis, cost prohibitive)  INR Goal: 2.0-3.0    Current warfarin regimen: patient reports taking warfarin 2.5 mg Fri; 1.25 mg daily ROW instead of the instructed warfarin regimen of 1.25 mg Wed; 2.5 mg daily ROW  Warfarin tablet strength: 2.5 mg, 5 mg   Duration of therapy: Indefinite      Results for orders placed or performed in visit on 12/16/24   POCT INR   Result Value Ref Range    Prothrombin time,(POC) 23.2     INR,(POC) 1.9 (A) 2.0 - 3.0       Today's pertinent positives includes:  No significant changes since last visit      Adherence:   Able to recall regimen? NO, patient reports taking warfarin 2.5 mg Fri; 1.25 mg daily ROW instead of the instructed warfarin regimen of 1.25 mg Wed; 2.5 mg daily ROW  Miss/extra dose? NO  Need refill? NO    Upcoming procedure(s):  N/A    Past Medical History:   Diagnosis

## 2024-12-07 NOTE — PATIENT INSTRUCTIONS
Today your INR was 1.9 .      Your goal INR is  2.0-3.0 .    You have a   2.5 mg tablet of Coumadin (warfarin).   Take Coumadin (warfarin) as follows:    Take 2.5 mg every Monday, Friday; and 1.25 mg all other days of the week.    Come back in 3 week(s) for your next finger stick/INR blood test.        Avoid any over the counter items containing aspirin or ibuprofen, and avoid great swings in general diet.  Avoid alcohol consumption.  Please notify the INR pharmacist if you are started on any new medication including over the counter or herbal supplements. Also, please notify your INR pharmacist if any of your other prescription or over the counter medications have been discontinued.     Call [facility] at [phone] if you have any signs of abnormal bleeding/blood clot.  ------------------------------------------------------------------------------------------------------------------  Taking Warfarin Safely: Care Instructions    Your Care Instructions  Warfarin is a medicine that you take to prevent blood clots. It is often called a blood thinner. Doctors give warfarin (such as Coumadin) to reduce the risk of blood clots. You may be at risk for blood clots if you have atrial fibrillation or deep vein thrombosis. Some other health problems may also put you at risk.  Warfarin slows the amount of time it takes for your blood to clot. It can cause bleeding problems. Even if you've been taking warfarin for a while, it's important to know how to take it safely.  Foods and other medicines can affect the way warfarin works. Some can make warfarin work too well. This can cause bleeding problems. And some can make it work poorly, so that it does not prevent blood clots very well.  You will need regular blood tests to check how long it takes for your blood to form a clot. This test is called a PT or prothrombin time test. The result of the test is called an INR level.

## 2024-12-16 ENCOUNTER — ANTI-COAG VISIT (OUTPATIENT)
Facility: HOSPITAL | Age: 74
End: 2024-12-16
Payer: MEDICARE

## 2024-12-16 DIAGNOSIS — I48.0 PAROXYSMAL ATRIAL FIBRILLATION (HCC): Primary | ICD-10-CM

## 2024-12-16 LAB
INTERNATIONAL NORMALIZATION RATIO, POC: 1.9 (ref 2–3)
PROTHROMBIN TIME, POC: 23.2

## 2024-12-16 PROCEDURE — 99212 OFFICE O/P EST SF 10 MIN: CPT

## 2024-12-16 PROCEDURE — 85610 PROTHROMBIN TIME: CPT

## 2025-01-01 NOTE — PATIENT INSTRUCTIONS
Today your INR was 1.5 .      Your goal INR is  2.0-3.0 .    You have a   2.5 mg tablet of Coumadin (warfarin).   Take Coumadin (warfarin) as follows:    Take 2.5 mg every Monday, Wednesday, Friday; and 1.25 mg all other days of the week.    Come back in 1 week(s) for your next finger stick/INR blood test.        Avoid any over the counter items containing aspirin or ibuprofen, and avoid great swings in general diet.  Avoid alcohol consumption.  Please notify the INR pharmacist if you are started on any new medication including over the counter or herbal supplements. Also, please notify your INR pharmacist if any of your other prescription or over the counter medications have been discontinued.     Call [facility] at [phone] if you have any signs of abnormal bleeding/blood clot.  ------------------------------------------------------------------------------------------------------------------  Taking Warfarin Safely: Care Instructions    Your Care Instructions  Warfarin is a medicine that you take to prevent blood clots. It is often called a blood thinner. Doctors give warfarin (such as Coumadin) to reduce the risk of blood clots. You may be at risk for blood clots if you have atrial fibrillation or deep vein thrombosis. Some other health problems may also put you at risk.  Warfarin slows the amount of time it takes for your blood to clot. It can cause bleeding problems. Even if you've been taking warfarin for a while, it's important to know how to take it safely.  Foods and other medicines can affect the way warfarin works. Some can make warfarin work too well. This can cause bleeding problems. And some can make it work poorly, so that it does not prevent blood clots very well.  You will need regular blood tests to check how long it takes for your blood to form a clot. This test is called a PT or prothrombin time test. The result of the test is called an INR

## 2025-01-01 NOTE — PROGRESS NOTES
Pharmacy Progress Note - Anticoagulation Management    S/O:  Ms. Darshana Kraus  is a 74 y.o. female seen today for anticoagulation management for the diagnosis of Atrial Fibrillation.     HPI: At last visit (12/16), patient's INR was 1.9 and subtherapeutic for an INR goal of 2.0-3.0. Patient denied any missed doses of warfarin and denied changes to her medications. Patient reported consistent intake of vitamin K foods. Patient reported taking warfarin 2.5 mg Fri; 1.25 mg daily ROW instead of the instructed warfarin regimen of 1.25 mg Wed; 2.5 mg daily ROW. Asked patient to confirm warfarin regimen multiple times during visit and patient reported the same regimen each time. Patient reported she did not use the warfarin daily dose calendar. Over the past 7 days, patient has taken 10 mg of warfarin. Will increase weekly warfarin dose from 10 mg to 11.25 mg (~ 12%) and patient will take warfarin 2.5 mg Mon, Fri; 1.25 mg daily ROW. Confirmed with patient Mondays and Fridays are easy to remember to take the 2.5 mg dose. Patient will recheck INR in 3 week(s) due to [place].    Interim History:    Warfarin start date: 11/2/24 (previously on Eliquis, cost prohibitive)  INR Goal: 2.0-3.0    Current warfarin regimen: Unable to determine  Warfarin tablet strength: 2.5 mg, 5 mg   Duration of therapy: Indefinite      Results for orders placed or performed in visit on 01/09/25   POCT INR   Result Value Ref Range    Prothrombin time,(POC) 18.5     INR,(POC) 1.5 (A) 2.0 - 3.0       Today's pertinent positives includes:  Medication change    Patient reports no longer taking venlafaxine, the addition of vitamin B12 5000 mg daily, and metformin was changed to  mg daily.    Adherence:   Able to recall regimen? NO  Miss/extra dose? NO  Need refill? NO    Upcoming procedure(s):  YES    Patient has an upcoming ablation and Watchman procedure on 1/31/25. Per patient's paperwork, warfarin therapy

## 2025-01-04 DIAGNOSIS — I48.0 PAROXYSMAL ATRIAL FIBRILLATION (HCC): Primary | ICD-10-CM

## 2025-01-04 DIAGNOSIS — I10 PRIMARY HYPERTENSION: ICD-10-CM

## 2025-01-06 RX ORDER — AMLODIPINE BESYLATE 2.5 MG/1
2.5 TABLET ORAL DAILY
Qty: 30 TABLET | Refills: 5 | Status: SHIPPED | OUTPATIENT
Start: 2025-01-06

## 2025-01-06 RX ORDER — AMIODARONE HYDROCHLORIDE 200 MG/1
200 TABLET ORAL DAILY
Qty: 30 TABLET | Refills: 0 | OUTPATIENT
Start: 2025-01-06

## 2025-01-06 RX ORDER — METOPROLOL TARTRATE 25 MG/1
25 TABLET, FILM COATED ORAL 2 TIMES DAILY
Qty: 60 TABLET | Refills: 5 | Status: SHIPPED | OUTPATIENT
Start: 2025-01-06

## 2025-01-06 NOTE — TELEPHONE ENCOUNTER
Chart reviewed. Dr. Rose stopped pt's amiodarone when she was seen in office on 11/26/24.     Requested Prescriptions     Signed Prescriptions Disp Refills    amLODIPine (NORVASC) 2.5 MG tablet 30 tablet 5     Sig: Take 1 tablet by mouth once daily     Authorizing Provider: SOPHIE ROSE     Ordering User: YONY MACK    metoprolol tartrate (LOPRESSOR) 25 MG tablet 60 tablet 5     Sig: Take 1 tablet by mouth twice daily     Authorizing Provider: SOPHIE ROSE     Ordering User: YONY MACK     Refused Prescriptions Disp Refills    amiodarone (CORDARONE) 200 MG tablet [Pharmacy Med Name: Amiodarone HCl 200 MG Oral Tablet] 30 tablet 0     Sig: Take 1 tablet by mouth once daily     Refused By: YONY MACK     Reason for Refusal: Medication discontinued    Per MD's VO

## 2025-01-09 ENCOUNTER — ANTI-COAG VISIT (OUTPATIENT)
Facility: HOSPITAL | Age: 75
End: 2025-01-09
Payer: MEDICARE

## 2025-01-09 DIAGNOSIS — I48.0 PAROXYSMAL ATRIAL FIBRILLATION (HCC): Primary | ICD-10-CM

## 2025-01-09 LAB
INTERNATIONAL NORMALIZATION RATIO, POC: 1.5 (ref 2–3)
PROTHROMBIN TIME, POC: 18.5

## 2025-01-09 PROCEDURE — 99212 OFFICE O/P EST SF 10 MIN: CPT

## 2025-01-09 PROCEDURE — 85610 PROTHROMBIN TIME: CPT

## 2025-01-09 RX ORDER — AMIODARONE HYDROCHLORIDE 200 MG/1
200 TABLET ORAL DAILY
COMMUNITY

## 2025-01-09 RX ORDER — VALSARTAN 80 MG/1
80 TABLET ORAL 2 TIMES DAILY
COMMUNITY

## 2025-01-09 RX ORDER — METFORMIN HYDROCHLORIDE 500 MG/1
500 TABLET, EXTENDED RELEASE ORAL DAILY
COMMUNITY

## 2025-01-09 RX ORDER — HYDROCHLOROTHIAZIDE 12.5 MG/1
12.5 TABLET ORAL DAILY
COMMUNITY

## 2025-01-15 ENCOUNTER — TELEPHONE (OUTPATIENT)
Age: 75
End: 2025-01-15

## 2025-01-15 NOTE — TELEPHONE ENCOUNTER
Andreina with [employer] called for a prior authorization - peer to peer for this patient.    Andreina # [phone]

## 2025-01-16 ENCOUNTER — ANTI-COAG VISIT (OUTPATIENT)
Facility: HOSPITAL | Age: 75
End: 2025-01-16
Payer: MEDICARE

## 2025-01-16 DIAGNOSIS — I48.0 PAROXYSMAL ATRIAL FIBRILLATION (HCC): Primary | ICD-10-CM

## 2025-01-16 LAB
INTERNATIONAL NORMALIZATION RATIO, POC: 2 (ref 2–3)
PROTHROMBIN TIME, POC: 23.9

## 2025-01-16 PROCEDURE — 85610 PROTHROMBIN TIME: CPT

## 2025-01-16 PROCEDURE — 99211 OFF/OP EST MAY X REQ PHY/QHP: CPT

## 2025-01-16 NOTE — TELEPHONE ENCOUNTER
Verified patient with two identifiers. This RN scheduled P2P with Dr Olguin for patients Watchman + afib ablation procedure.    P2P scheduled for 1/21/25 at 1240pm

## 2025-01-16 NOTE — TELEPHONE ENCOUNTER
Verified patient with two identifiers. This RN called pt to check in on when CTA scan will be completed prior to watchman and afib ablation. Pt confirmed apt on 1/28

## 2025-01-16 NOTE — PATIENT INSTRUCTIONS
Today your INR was 2.0 .      Your goal INR is  2.0-3.0 .    You have a   2.5 mg tablet of Coumadin (warfarin).   Take Coumadin (warfarin) as follows:    Take 2.5 mg every Monday, Wednesday, Friday; and 1.25 mg all other days of the week.    Come back in 2 week(s) for your next finger stick/INR blood test.        Avoid any over the counter items containing aspirin or ibuprofen, and avoid great swings in general diet.  Avoid alcohol consumption.  Please notify the INR pharmacist if you are started on any new medication including over the counter or herbal supplements. Also, please notify your INR pharmacist if any of your other prescription or over the counter medications have been discontinued.     Call [facility] at [phone] if you have any signs of abnormal bleeding/blood clot.  ------------------------------------------------------------------------------------------------------------------  Taking Warfarin Safely: Care Instructions    Your Care Instructions  Warfarin is a medicine that you take to prevent blood clots. It is often called a blood thinner. Doctors give warfarin (such as Coumadin) to reduce the risk of blood clots. You may be at risk for blood clots if you have atrial fibrillation or deep vein thrombosis. Some other health problems may also put you at risk.  Warfarin slows the amount of time it takes for your blood to clot. It can cause bleeding problems. Even if you've been taking warfarin for a while, it's important to know how to take it safely.  Foods and other medicines can affect the way warfarin works. Some can make warfarin work too well. This can cause bleeding problems. And some can make it work poorly, so that it does not prevent blood clots very well.  You will need regular blood tests to check how long it takes for your blood to form a clot. This test is called a PT or prothrombin time test. The result of the test is called an INR

## 2025-01-16 NOTE — PROGRESS NOTES
current warfarin regimen of 2.5 mg Mon, Wed, Fri; 1.25 mg daily ROW and recheck INR in 2 week(s). Informed patient to take last dose of amiodarone tonight due to upcoming Watchman implant on 25. Patient's Watchman implant paperwork instructed her to stop amiodarone 2 weeks prior to the procedure and warfarin does not need to be held for the procedure.      Patient was instructed to schedule an appointment in 2 week(s) prior to leaving the clinic.    Medication reconciliation was completed during the visit.    There are no discontinued medications.      A full discussion of the nature of anticoagulants has been carried out.  A full discussion of the need for frequent and regular monitoring, precise dosage adjustment and compliance was stressed.  Side effects of potential bleeding were discussed and Ms. Kraus was instructed to call [phone] if there are any signs of abnormal bleeding.  Ms. Kraus was instructed to avoid any OTC items containing aspirin or ibuprofen and prior to starting any new OTC products to consult with her physician or pharmacist to ensure no drug interactions are present.  Ms. Kraus was instructed to avoid any major changes in her general diet and to avoid alcohol consumption.    Ms. Kraus was provided information in the AVS that includes topics on understanding coumadin therapy, drug interaction considerations, vitamin K and coumadin use, interactions with foods and supplements containing vitamin K, and the use of herbal products.    Ms. Kraus verbalized her understanding of all instructions and will call the office with any questions, concerns, or signs of abnormal bleeding or blood clot.  Notifications of recommendations will be sent to Dr. Quoc Olguin MD for review.    Thank you,  Sakshi Chino [facility]      For Pharmacy Admin Tracking Only    Intervention Detail: Adherence Monitorin and Lab(s) Ordered  Total # of Interventions Recommended: 2  Total # of Interventions Accepted:

## 2025-01-17 ENCOUNTER — PREP FOR PROCEDURE (OUTPATIENT)
Age: 75
End: 2025-01-17

## 2025-01-17 RX ORDER — SODIUM CHLORIDE 0.9 % (FLUSH) 0.9 %
5-40 SYRINGE (ML) INJECTION EVERY 12 HOURS SCHEDULED
Status: CANCELLED | OUTPATIENT
Start: 2025-01-17

## 2025-01-17 RX ORDER — SODIUM CHLORIDE 0.9 % (FLUSH) 0.9 %
5-40 SYRINGE (ML) INJECTION PRN
Status: CANCELLED | OUTPATIENT
Start: 2025-01-17

## 2025-01-17 RX ORDER — SODIUM CHLORIDE 9 MG/ML
INJECTION, SOLUTION INTRAVENOUS PRN
Status: CANCELLED | OUTPATIENT
Start: 2025-01-17

## 2025-01-25 NOTE — PATIENT INSTRUCTIONS
sprouts.  Vegetable drinks, green tea leaves, and some dietary supplement drinks.  Avoid cranberry juice and other cranberry products. They can increase the effects of warfarin.  Limit your use of alcohol.    Avoid bleeding by preventing falls and injuries  Wear slippers or shoes with nonskid soles.  Remove throw rugs and clutter.  Rearrange furniture and electrical cords to keep them out of walking paths.  Keep stairways, porches, and outside walkways well lit. Use night-lights in hallways and bathrooms.  Be extra careful when you work with sharp tools or knives.    When should you call for help?  Call 911 anytime you think you may need emergency care. For example, call if:  You have a sudden, severe headache that is different from past headaches.  Call your doctor now or seek immediate medical care if:  You have any abnormal bleeding, such as:  Nosebleeds.  Vaginal bleeding that is different (heavier, more frequent, at a different time of the month) than what you are used to.  Bloody or black stools, or rectal bleeding.  Bloody or pink urine.  Watch closely for changes in your health, and be sure to contact your doctor if you have any problems.    Where can you learn more?  Go to http://www.[employer].net/Attuneonnections.  Enter N655 in the search box to learn more about \"Taking Warfarin Safely: Care Instructions.\"  Current as of: January 27, 2016  Content Version: 11.1  © 7401-0289 [employer]. Care instructions adapted under license by [employer] (which disclaims liability or warranty for this information). If you have questions about a medical condition or this instruction, always ask your healthcare professional. [employer] disclaims any warranty or liability for your use of this information.

## 2025-01-25 NOTE — PROGRESS NOTES
Pharmacy Progress Note - Anticoagulation Management    S/O:  Ms. Darshana Kraus  is a 74 y.o. female seen today for anticoagulation management for the diagnosis of Atrial Fibrillation.     HPI: At last visit (1/16), patient's INR was 2.0 and therapeutic for an INR goal of 2.0-3.0. Patient denied any changes to her medications and reported consistent intake of vitamin K foods. Patient will continue current warfarin regimen of 2.5 mg Mon, Wed, Fri; 1.25 mg daily ROW and recheck INR in 2 week(s). Informed patient to take last dose of amiodarone tonight due to upcoming Watchman implant on 1/31/25. Patient's Watchman implant paperwork instructed her to stop amiodarone 2 weeks prior to the procedure and warfarin does not need to be held for the procedure.    Interim History:    Warfarin start date: 11/2/24 (previously on Eliquis, cost prohibitive)  INR Goal: 2.0-3.0    Current warfarin regimen: 2.5 mg Mon, Wed, Fri; 1.25 mg daily ROW  Warfarin tablet strength: 2.5 mg, 5 mg   Duration of therapy: Indefinite      Results for orders placed or performed in visit on 01/27/25   POCT INR   Result Value Ref Range    Prothrombin time,(POC) 35.1     INR,(POC) 2.9 2.0 - 3.0       Today's pertinent positives includes:  No significant changes since last visit      Adherence:   Able to recall regimen? N/A, patient reports using the warfarin daily dose calendar to fill her pill box.  Miss/extra dose? NO  Need refill? NO    Upcoming procedure(s):  YES    Patient has an upcoming ablation and Watchman procedure on 1/31/25. Per patient's paperwork, warfarin therapy will not need to be held for procedure. However, amiodarone will need to be held for 2 weeks prior to procedure on 1/31/25.    Past Medical History:   Diagnosis Date    Asthma     Atrial fibrillation (HCC)     Bipolar 1 disorder (HCC)     Essential hypertension     Pneumonia      Allergies   Allergen Reactions    Nexium [Esomeprazole] Anaphylaxis    Esomeprazole Magnesium Other (See

## 2025-01-27 ENCOUNTER — ANTI-COAG VISIT (OUTPATIENT)
Facility: HOSPITAL | Age: 75
End: 2025-01-27
Payer: MEDICARE

## 2025-01-27 DIAGNOSIS — I48.0 PAROXYSMAL ATRIAL FIBRILLATION (HCC): Primary | ICD-10-CM

## 2025-01-27 LAB
INTERNATIONAL NORMALIZATION RATIO, POC: 2.9 (ref 2–3)
PROTHROMBIN TIME, POC: 35.1

## 2025-01-27 PROCEDURE — 99211 OFF/OP EST MAY X REQ PHY/QHP: CPT

## 2025-01-27 PROCEDURE — 85610 PROTHROMBIN TIME: CPT

## 2025-01-28 ENCOUNTER — TELEPHONE (OUTPATIENT)
Age: 75
End: 2025-01-28

## 2025-01-28 ENCOUNTER — HOSPITAL ENCOUNTER (OUTPATIENT)
Facility: HOSPITAL | Age: 75
Discharge: HOME | End: 2025-01-31
Attending: HOSPITALIST
Payer: MEDICARE

## 2025-01-28 DIAGNOSIS — I49.5 SICK SINUS SYNDROME (HCC): ICD-10-CM

## 2025-01-28 DIAGNOSIS — I48.0 PAROXYSMAL ATRIAL FIBRILLATION (HCC): ICD-10-CM

## 2025-01-28 DIAGNOSIS — F10.10 ALCOHOL ABUSE: ICD-10-CM

## 2025-01-28 DIAGNOSIS — I49.5 SSS (SICK SINUS SYNDROME) (HCC): ICD-10-CM

## 2025-01-28 DIAGNOSIS — F31.9 BIPOLAR 1 DISORDER (HCC): ICD-10-CM

## 2025-01-28 DIAGNOSIS — I10 PRIMARY HYPERTENSION: ICD-10-CM

## 2025-01-28 DIAGNOSIS — I42.0 DILATED CARDIOMYOPATHY (HCC): ICD-10-CM

## 2025-01-28 DIAGNOSIS — I48.19 PERSISTENT ATRIAL FIBRILLATION (HCC): ICD-10-CM

## 2025-01-28 PROCEDURE — 71275 CT ANGIOGRAPHY CHEST: CPT

## 2025-01-28 PROCEDURE — 6360000004 HC RX CONTRAST MEDICATION: Performed by: RADIOLOGY

## 2025-01-28 RX ORDER — IOPAMIDOL 755 MG/ML
100 INJECTION, SOLUTION INTRAVASCULAR
Status: COMPLETED | OUTPATIENT
Start: 2025-01-28 | End: 2025-01-28

## 2025-01-28 RX ADMIN — IOPAMIDOL 70 ML: 755 INJECTION, SOLUTION INTRAVENOUS at 07:55

## 2025-01-28 NOTE — TELEPHONE ENCOUNTER
Verified patient with two identifiers. This RN called patient to inform her of the rescheduling Watchman Procedure to 4/18 at 10am with an arrival time of 8am. Pt verbalized understanding and confirmed afib ablation procedure date of 1/31 with an arrival time of 12pm.

## 2025-01-31 ENCOUNTER — HOSPITAL ENCOUNTER (INPATIENT)
Facility: HOSPITAL | Age: 75
LOS: 1 days | Discharge: HOME | DRG: 317 | End: 2025-02-01
Attending: HOSPITALIST | Admitting: HOSPITALIST
Payer: MEDICARE

## 2025-01-31 ENCOUNTER — HOSPITAL ENCOUNTER (OUTPATIENT)
Facility: HOSPITAL | Age: 75
Discharge: HOME | End: 2025-02-02
Attending: HOSPITALIST
Payer: MEDICARE

## 2025-01-31 ENCOUNTER — ANESTHESIA EVENT (OUTPATIENT)
Facility: HOSPITAL | Age: 75
End: 2025-01-31
Payer: MEDICARE

## 2025-01-31 ENCOUNTER — ANESTHESIA (OUTPATIENT)
Facility: HOSPITAL | Age: 75
End: 2025-01-31
Payer: MEDICARE

## 2025-01-31 DIAGNOSIS — I48.91 ATRIAL FIBRILLATION, UNSPECIFIED TYPE (HCC): Primary | ICD-10-CM

## 2025-01-31 DIAGNOSIS — I49.5 SICK SINUS SYNDROME (HCC): ICD-10-CM

## 2025-01-31 DIAGNOSIS — I48.0 PAROXYSMAL ATRIAL FIBRILLATION (HCC): ICD-10-CM

## 2025-01-31 DIAGNOSIS — I48.0 PAROXYSMAL A-FIB (HCC): ICD-10-CM

## 2025-01-31 PROBLEM — Z95.818 PRESENCE OF WATCHMAN LEFT ATRIAL APPENDAGE CLOSURE DEVICE: Status: ACTIVE | Noted: 2025-01-31

## 2025-01-31 LAB
ABO + RH BLD: NORMAL
ACT BLD: 187 SECS (ref 79–138)
ACT BLD: 308 SECS (ref 79–138)
ACT BLD: 331 SECS (ref 79–138)
ACT BLD: 337 SECS (ref 79–138)
ACT BLD: 366 SECS (ref 79–138)
ACT BLD: 377 SECS (ref 79–138)
ANION GAP SERPL CALC-SCNC: 7 MMOL/L (ref 2–12)
BLOOD GROUP ANTIBODIES SERPL: NORMAL
BUN SERPL-MCNC: 7 MG/DL (ref 6–20)
BUN/CREAT SERPL: 8 (ref 12–20)
CALCIUM SERPL-MCNC: 9.3 MG/DL (ref 8.5–10.1)
CHLORIDE SERPL-SCNC: 102 MMOL/L (ref 97–108)
CO2 SERPL-SCNC: 26 MMOL/L (ref 21–32)
CREAT SERPL-MCNC: 0.84 MG/DL (ref 0.55–1.02)
ERYTHROCYTE [DISTWIDTH] IN BLOOD BY AUTOMATED COUNT: 15 % (ref 11.5–14.5)
GLUCOSE BLD STRIP.AUTO-MCNC: 103 MG/DL (ref 65–117)
GLUCOSE SERPL-MCNC: 98 MG/DL (ref 65–100)
HCT VFR BLD AUTO: 30.6 % (ref 35–47)
HGB BLD-MCNC: 10.2 G/DL (ref 11.5–16)
INR PPP: 3.1 (ref 0.9–1.1)
INR, POC: 3.8 %
MCH RBC QN AUTO: 32.4 PG (ref 26–34)
MCHC RBC AUTO-ENTMCNC: 33.3 G/DL (ref 30–36.5)
MCV RBC AUTO: 97.1 FL (ref 80–99)
NRBC # BLD: 0 K/UL (ref 0–0.01)
NRBC BLD-RTO: 0 PER 100 WBC
PLATELET # BLD AUTO: 267 K/UL (ref 150–400)
PMV BLD AUTO: 9.7 FL (ref 8.9–12.9)
POTASSIUM SERPL-SCNC: 4.3 MMOL/L (ref 3.5–5.1)
PROTHROMBIN TIME: 30.6 SEC (ref 9.2–11.2)
RBC # BLD AUTO: 3.15 M/UL (ref 3.8–5.2)
SERVICE CMNT-IMP: NORMAL
SODIUM SERPL-SCNC: 135 MMOL/L (ref 136–145)
SPECIMEN EXP DATE BLD: NORMAL
WBC # BLD AUTO: 4 K/UL (ref 3.6–11)

## 2025-01-31 PROCEDURE — C1766 INTRO/SHEATH,STRBLE,NON-PEEL: HCPCS | Performed by: HOSPITALIST

## 2025-01-31 PROCEDURE — 3700000000 HC ANESTHESIA ATTENDED CARE: Performed by: HOSPITALIST

## 2025-01-31 PROCEDURE — C1893 INTRO/SHEATH, FIXED,NON-PEEL: HCPCS | Performed by: HOSPITALIST

## 2025-01-31 PROCEDURE — 6370000000 HC RX 637 (ALT 250 FOR IP)

## 2025-01-31 PROCEDURE — 36415 COLL VENOUS BLD VENIPUNCTURE: CPT

## 2025-01-31 PROCEDURE — 3700000001 HC ADD 15 MINUTES (ANESTHESIA): Performed by: HOSPITALIST

## 2025-01-31 PROCEDURE — 93656 COMPRE EP EVAL ABLTJ ATR FIB: CPT | Performed by: HOSPITALIST

## 2025-01-31 PROCEDURE — 2720000010 HC SURG SUPPLY STERILE: Performed by: HOSPITALIST

## 2025-01-31 PROCEDURE — 85610 PROTHROMBIN TIME: CPT

## 2025-01-31 PROCEDURE — 6360000004 HC RX CONTRAST MEDICATION: Performed by: HOSPITALIST

## 2025-01-31 PROCEDURE — 33340 PERQ CLSR TCAT L ATR APNDGE: CPT | Performed by: HOSPITALIST

## 2025-01-31 PROCEDURE — 4A023FZ MEASUREMENT OF CARDIAC RHYTHM, PERCUTANEOUS APPROACH: ICD-10-PCS | Performed by: HOSPITALIST

## 2025-01-31 PROCEDURE — 2580000003 HC RX 258: Performed by: NURSE ANESTHETIST, CERTIFIED REGISTERED

## 2025-01-31 PROCEDURE — 2580000003 HC RX 258: Performed by: HOSPITALIST

## 2025-01-31 PROCEDURE — 93312 ECHO TRANSESOPHAGEAL: CPT

## 2025-01-31 PROCEDURE — 86901 BLOOD TYPING SEROLOGIC RH(D): CPT

## 2025-01-31 PROCEDURE — 85347 COAGULATION TIME ACTIVATED: CPT

## 2025-01-31 PROCEDURE — C1759 CATH, INTRA ECHOCARDIOGRAPHY: HCPCS | Performed by: HOSPITALIST

## 2025-01-31 PROCEDURE — 2500000003 HC RX 250 WO HCPCS: Performed by: NURSE ANESTHETIST, CERTIFIED REGISTERED

## 2025-01-31 PROCEDURE — 80048 BASIC METABOLIC PNL TOTAL CA: CPT

## 2025-01-31 PROCEDURE — 2060000000 HC ICU INTERMEDIATE R&B

## 2025-01-31 PROCEDURE — C1760 CLOSURE DEV, VASC: HCPCS | Performed by: HOSPITALIST

## 2025-01-31 PROCEDURE — 86850 RBC ANTIBODY SCREEN: CPT

## 2025-01-31 PROCEDURE — 2709999900 HC NON-CHARGEABLE SUPPLY: Performed by: HOSPITALIST

## 2025-01-31 PROCEDURE — 6360000002 HC RX W HCPCS: Performed by: NURSE ANESTHETIST, CERTIFIED REGISTERED

## 2025-01-31 PROCEDURE — C1889 IMPLANT/INSERT DEVICE, NOC: HCPCS | Performed by: HOSPITALIST

## 2025-01-31 PROCEDURE — C1732 CATH, EP, DIAG/ABL, 3D/VECT: HCPCS | Performed by: HOSPITALIST

## 2025-01-31 PROCEDURE — 86900 BLOOD TYPING SEROLOGIC ABO: CPT

## 2025-01-31 PROCEDURE — 02L73DK OCCLUSION OF LEFT ATRIAL APPENDAGE WITH INTRALUMINAL DEVICE, PERCUTANEOUS APPROACH: ICD-10-PCS | Performed by: HOSPITALIST

## 2025-01-31 PROCEDURE — 5A2204Z RESTORATION OF CARDIAC RHYTHM, SINGLE: ICD-10-PCS | Performed by: HOSPITALIST

## 2025-01-31 PROCEDURE — 82962 GLUCOSE BLOOD TEST: CPT

## 2025-01-31 PROCEDURE — 02583ZZ DESTRUCTION OF CONDUCTION MECHANISM, PERCUTANEOUS APPROACH: ICD-10-PCS | Performed by: HOSPITALIST

## 2025-01-31 PROCEDURE — B246ZZZ ULTRASONOGRAPHY OF RIGHT AND LEFT HEART: ICD-10-PCS | Performed by: HOSPITALIST

## 2025-01-31 PROCEDURE — 93623 PRGRMD STIMJ&PACG IV RX NFS: CPT | Performed by: HOSPITALIST

## 2025-01-31 PROCEDURE — 93622 COMP EP EVAL L VENTR PAC&REC: CPT | Performed by: HOSPITALIST

## 2025-01-31 PROCEDURE — 4A0234Z MEASUREMENT OF CARDIAC ELECTRICAL ACTIVITY, PERCUTANEOUS APPROACH: ICD-10-PCS | Performed by: HOSPITALIST

## 2025-01-31 PROCEDURE — C1769 GUIDE WIRE: HCPCS | Performed by: HOSPITALIST

## 2025-01-31 PROCEDURE — 4A023N8 MEASUREMENT OF CARDIAC SAMPLING AND PRESSURE, BILATERAL, PERCUTANEOUS APPROACH: ICD-10-PCS | Performed by: HOSPITALIST

## 2025-01-31 PROCEDURE — 6360000002 HC RX W HCPCS: Performed by: ANESTHESIOLOGY

## 2025-01-31 PROCEDURE — 02K83ZZ MAP CONDUCTION MECHANISM, PERCUTANEOUS APPROACH: ICD-10-PCS | Performed by: HOSPITALIST

## 2025-01-31 PROCEDURE — C1894 INTRO/SHEATH, NON-LASER: HCPCS | Performed by: HOSPITALIST

## 2025-01-31 PROCEDURE — 85027 COMPLETE CBC AUTOMATED: CPT

## 2025-01-31 PROCEDURE — 92960 CARDIOVERSION ELECTRIC EXT: CPT | Performed by: HOSPITALIST

## 2025-01-31 PROCEDURE — B24BZZ4 ULTRASONOGRAPHY OF HEART WITH AORTA, TRANSESOPHAGEAL: ICD-10-PCS | Performed by: HOSPITALIST

## 2025-01-31 PROCEDURE — 2500000003 HC RX 250 WO HCPCS

## 2025-01-31 PROCEDURE — 76937 US GUIDE VASCULAR ACCESS: CPT | Performed by: HOSPITALIST

## 2025-01-31 DEVICE — LEFT ATRIAL APPENDAGE CLOSURE DEVICE WITH DELIVERY SYSTEM
Type: IMPLANTABLE DEVICE | Status: FUNCTIONAL
Brand: WATCHMAN FLX™ PRO

## 2025-01-31 RX ORDER — LIDOCAINE HYDROCHLORIDE 20 MG/ML
INJECTION, SOLUTION EPIDURAL; INFILTRATION; INTRACAUDAL; PERINEURAL
Status: DISCONTINUED | OUTPATIENT
Start: 2025-01-31 | End: 2025-01-31 | Stop reason: SDUPTHER

## 2025-01-31 RX ORDER — WARFARIN SODIUM 5 MG/1
5 TABLET ORAL DAILY
Status: DISCONTINUED | OUTPATIENT
Start: 2025-02-01 | End: 2025-01-31 | Stop reason: DRUGHIGH

## 2025-01-31 RX ORDER — SODIUM CHLORIDE 0.9 % (FLUSH) 0.9 %
5-40 SYRINGE (ML) INJECTION EVERY 12 HOURS SCHEDULED
Status: DISCONTINUED | OUTPATIENT
Start: 2025-01-31 | End: 2025-02-01 | Stop reason: HOSPADM

## 2025-01-31 RX ORDER — PROTAMINE SULFATE 10 MG/ML
INJECTION, SOLUTION INTRAVENOUS
Status: DISCONTINUED | OUTPATIENT
Start: 2025-01-31 | End: 2025-01-31 | Stop reason: SDUPTHER

## 2025-01-31 RX ORDER — GABAPENTIN 300 MG/1
300 CAPSULE ORAL 3 TIMES DAILY PRN
Status: DISCONTINUED | OUTPATIENT
Start: 2025-01-31 | End: 2025-02-01 | Stop reason: HOSPADM

## 2025-01-31 RX ORDER — WARFARIN SODIUM 2.5 MG/1
2.5 TABLET ORAL
Status: COMPLETED | OUTPATIENT
Start: 2025-01-31 | End: 2025-01-31

## 2025-01-31 RX ORDER — OXYBUTYNIN CHLORIDE 5 MG/1
5 TABLET, EXTENDED RELEASE ORAL DAILY
Status: DISCONTINUED | OUTPATIENT
Start: 2025-02-01 | End: 2025-02-01 | Stop reason: HOSPADM

## 2025-01-31 RX ORDER — ACETAMINOPHEN 325 MG/1
650 TABLET ORAL EVERY 4 HOURS PRN
Status: DISCONTINUED | OUTPATIENT
Start: 2025-01-31 | End: 2025-02-01 | Stop reason: HOSPADM

## 2025-01-31 RX ORDER — EPHEDRINE SULFATE 50 MG/ML
INJECTION INTRAVENOUS
Status: DISCONTINUED | OUTPATIENT
Start: 2025-01-31 | End: 2025-01-31 | Stop reason: SDUPTHER

## 2025-01-31 RX ORDER — DEXAMETHASONE SODIUM PHOSPHATE 4 MG/ML
INJECTION, SOLUTION INTRA-ARTICULAR; INTRALESIONAL; INTRAMUSCULAR; INTRAVENOUS; SOFT TISSUE
Status: DISCONTINUED | OUTPATIENT
Start: 2025-01-31 | End: 2025-01-31 | Stop reason: SDUPTHER

## 2025-01-31 RX ORDER — FENTANYL CITRATE 50 UG/ML
INJECTION, SOLUTION INTRAMUSCULAR; INTRAVENOUS
Status: DISCONTINUED | OUTPATIENT
Start: 2025-01-31 | End: 2025-01-31 | Stop reason: SDUPTHER

## 2025-01-31 RX ORDER — METOPROLOL TARTRATE 25 MG/1
25 TABLET, FILM COATED ORAL 2 TIMES DAILY
Status: DISCONTINUED | OUTPATIENT
Start: 2025-01-31 | End: 2025-02-01

## 2025-01-31 RX ORDER — ASPIRIN 81 MG/1
81 TABLET, CHEWABLE ORAL DAILY
Qty: 30 TABLET | Refills: 3 | Status: SHIPPED | OUTPATIENT
Start: 2025-02-01

## 2025-01-31 RX ORDER — LEVOTHYROXINE SODIUM 75 UG/1
75 TABLET ORAL
Status: DISCONTINUED | OUTPATIENT
Start: 2025-02-01 | End: 2025-02-01 | Stop reason: HOSPADM

## 2025-01-31 RX ORDER — ONDANSETRON 2 MG/ML
INJECTION INTRAMUSCULAR; INTRAVENOUS
Status: DISCONTINUED | OUTPATIENT
Start: 2025-01-31 | End: 2025-01-31 | Stop reason: SDUPTHER

## 2025-01-31 RX ORDER — ATORVASTATIN CALCIUM 20 MG/1
20 TABLET, FILM COATED ORAL DAILY
Status: DISCONTINUED | OUTPATIENT
Start: 2025-02-01 | End: 2025-02-01 | Stop reason: HOSPADM

## 2025-01-31 RX ORDER — ROCURONIUM BROMIDE 10 MG/ML
INJECTION, SOLUTION INTRAVENOUS
Status: DISCONTINUED | OUTPATIENT
Start: 2025-01-31 | End: 2025-01-31 | Stop reason: SDUPTHER

## 2025-01-31 RX ORDER — IOPAMIDOL 755 MG/ML
INJECTION, SOLUTION INTRAVASCULAR PRN
Status: DISCONTINUED | OUTPATIENT
Start: 2025-01-31 | End: 2025-01-31 | Stop reason: HOSPADM

## 2025-01-31 RX ORDER — METFORMIN HYDROCHLORIDE 500 MG/1
500 TABLET, EXTENDED RELEASE ORAL DAILY
Status: DISCONTINUED | OUTPATIENT
Start: 2025-02-01 | End: 2025-02-01 | Stop reason: HOSPADM

## 2025-01-31 RX ORDER — CEFAZOLIN SODIUM 1 G/3ML
INJECTION, POWDER, FOR SOLUTION INTRAMUSCULAR; INTRAVENOUS
Status: DISCONTINUED | OUTPATIENT
Start: 2025-01-31 | End: 2025-01-31 | Stop reason: SDUPTHER

## 2025-01-31 RX ORDER — SUCRALFATE 1 G/1
1 TABLET ORAL 2 TIMES DAILY
Qty: 60 TABLET | Refills: 0 | Status: SHIPPED | OUTPATIENT
Start: 2025-01-31 | End: 2025-03-02

## 2025-01-31 RX ORDER — SODIUM CHLORIDE 0.9 % (FLUSH) 0.9 %
5-40 SYRINGE (ML) INJECTION EVERY 12 HOURS SCHEDULED
Status: DISCONTINUED | OUTPATIENT
Start: 2025-01-31 | End: 2025-01-31 | Stop reason: HOSPADM

## 2025-01-31 RX ORDER — SUCCINYLCHOLINE/SOD CL,ISO/PF 200MG/10ML
SYRINGE (ML) INTRAVENOUS
Status: DISCONTINUED | OUTPATIENT
Start: 2025-01-31 | End: 2025-01-31 | Stop reason: SDUPTHER

## 2025-01-31 RX ORDER — SODIUM CHLORIDE 0.9 % (FLUSH) 0.9 %
5-40 SYRINGE (ML) INJECTION PRN
Status: DISCONTINUED | OUTPATIENT
Start: 2025-01-31 | End: 2025-01-31 | Stop reason: HOSPADM

## 2025-01-31 RX ORDER — ASPIRIN 81 MG/1
81 TABLET, CHEWABLE ORAL DAILY
Status: DISCONTINUED | OUTPATIENT
Start: 2025-02-01 | End: 2025-02-01 | Stop reason: HOSPADM

## 2025-01-31 RX ORDER — SUCRALFATE 1 G/1
1 TABLET ORAL 2 TIMES DAILY
Status: DISCONTINUED | OUTPATIENT
Start: 2025-01-31 | End: 2025-02-01 | Stop reason: HOSPADM

## 2025-01-31 RX ORDER — HEPARIN SODIUM 1000 [USP'U]/ML
INJECTION, SOLUTION INTRAVENOUS; SUBCUTANEOUS
Status: DISCONTINUED | OUTPATIENT
Start: 2025-01-31 | End: 2025-01-31 | Stop reason: SDUPTHER

## 2025-01-31 RX ORDER — OXCARBAZEPINE 150 MG/1
300 TABLET, FILM COATED ORAL 2 TIMES DAILY
Status: DISCONTINUED | OUTPATIENT
Start: 2025-01-31 | End: 2025-02-01 | Stop reason: HOSPADM

## 2025-01-31 RX ORDER — FAMOTIDINE 20 MG/1
20 TABLET, FILM COATED ORAL 2 TIMES DAILY
Status: DISCONTINUED | OUTPATIENT
Start: 2025-01-31 | End: 2025-02-01 | Stop reason: HOSPADM

## 2025-01-31 RX ORDER — PROPOFOL 10 MG/ML
INJECTION, EMULSION INTRAVENOUS
Status: DISCONTINUED | OUTPATIENT
Start: 2025-01-31 | End: 2025-01-31 | Stop reason: SDUPTHER

## 2025-01-31 RX ORDER — VALSARTAN 40 MG/1
80 TABLET ORAL 2 TIMES DAILY
Status: DISCONTINUED | OUTPATIENT
Start: 2025-01-31 | End: 2025-02-01 | Stop reason: HOSPADM

## 2025-01-31 RX ORDER — SODIUM CHLORIDE 9 MG/ML
INJECTION, SOLUTION INTRAVENOUS PRN
Status: DISCONTINUED | OUTPATIENT
Start: 2025-01-31 | End: 2025-01-31 | Stop reason: HOSPADM

## 2025-01-31 RX ORDER — SODIUM CHLORIDE 0.9 % (FLUSH) 0.9 %
5-40 SYRINGE (ML) INJECTION PRN
Status: DISCONTINUED | OUTPATIENT
Start: 2025-01-31 | End: 2025-02-01 | Stop reason: HOSPADM

## 2025-01-31 RX ORDER — CEFAZOLIN SODIUM 1 G/3ML
INJECTION, POWDER, FOR SOLUTION INTRAMUSCULAR; INTRAVENOUS
Status: DISCONTINUED | OUTPATIENT
Start: 2025-01-31 | End: 2025-01-31

## 2025-01-31 RX ADMIN — FAMOTIDINE 20 MG: 20 TABLET, FILM COATED ORAL at 21:16

## 2025-01-31 RX ADMIN — ACETAMINOPHEN 650 MG: 325 TABLET ORAL at 21:16

## 2025-01-31 RX ADMIN — SODIUM CHLORIDE, PRESERVATIVE FREE 10 ML: 5 INJECTION INTRAVENOUS at 21:17

## 2025-01-31 RX ADMIN — Medication 120 MG: at 12:10

## 2025-01-31 RX ADMIN — EPHEDRINE SULFATE 10 MG: 50 INJECTION INTRAVENOUS at 14:51

## 2025-01-31 RX ADMIN — ONDANSETRON 4 MG: 2 INJECTION INTRAMUSCULAR; INTRAVENOUS at 15:36

## 2025-01-31 RX ADMIN — HEPARIN SODIUM 11000 UNITS: 1000 INJECTION, SOLUTION INTRAVENOUS; SUBCUTANEOUS at 12:54

## 2025-01-31 RX ADMIN — ROCURONIUM BROMIDE 5 MG: 10 INJECTION, SOLUTION INTRAVENOUS at 12:10

## 2025-01-31 RX ADMIN — WARFARIN SODIUM 2.5 MG: 2.5 TABLET ORAL at 18:22

## 2025-01-31 RX ADMIN — DEXAMETHASONE SODIUM PHOSPHATE 4 MG: 4 INJECTION, SOLUTION INTRA-ARTICULAR; INTRALESIONAL; INTRAMUSCULAR; INTRAVENOUS; SOFT TISSUE at 12:30

## 2025-01-31 RX ADMIN — LIDOCAINE HYDROCHLORIDE 80 MG: 20 INJECTION, SOLUTION EPIDURAL; INFILTRATION; INTRACAUDAL; PERINEURAL at 12:10

## 2025-01-31 RX ADMIN — GABAPENTIN 300 MG: 300 CAPSULE ORAL at 21:21

## 2025-01-31 RX ADMIN — FENTANYL CITRATE 25 MCG: 50 INJECTION, SOLUTION INTRAMUSCULAR; INTRAVENOUS at 12:10

## 2025-01-31 RX ADMIN — EPHEDRINE SULFATE 5 MG: 50 INJECTION INTRAVENOUS at 12:55

## 2025-01-31 RX ADMIN — SODIUM CHLORIDE: 9 INJECTION, SOLUTION INTRAVENOUS at 11:46

## 2025-01-31 RX ADMIN — FENTANYL CITRATE 25 MCG: 50 INJECTION, SOLUTION INTRAMUSCULAR; INTRAVENOUS at 13:20

## 2025-01-31 RX ADMIN — PROPOFOL 130 MG: 10 INJECTION, EMULSION INTRAVENOUS at 12:10

## 2025-01-31 RX ADMIN — FENTANYL CITRATE 50 MCG: 50 INJECTION, SOLUTION INTRAMUSCULAR; INTRAVENOUS at 14:23

## 2025-01-31 RX ADMIN — SODIUM CHLORIDE: 9 INJECTION, SOLUTION INTRAVENOUS at 14:00

## 2025-01-31 RX ADMIN — OXCARBAZEPINE 300 MG: 150 TABLET, FILM COATED ORAL at 21:16

## 2025-01-31 RX ADMIN — SUCRALFATE 1 G: 1 TABLET ORAL at 21:16

## 2025-01-31 RX ADMIN — HEPARIN SODIUM 3000 UNITS: 1000 INJECTION, SOLUTION INTRAVENOUS; SUBCUTANEOUS at 14:16

## 2025-01-31 RX ADMIN — EPHEDRINE SULFATE 10 MG: 50 INJECTION INTRAVENOUS at 14:49

## 2025-01-31 RX ADMIN — VALSARTAN 80 MG: 40 TABLET, FILM COATED ORAL at 21:15

## 2025-01-31 RX ADMIN — PROTAMINE SULFATE 40 MG: 10 INJECTION, SOLUTION INTRAVENOUS at 15:40

## 2025-01-31 RX ADMIN — CEFAZOLIN SODIUM 2 G: 1 INJECTION, POWDER, FOR SOLUTION INTRAMUSCULAR; INTRAVENOUS at 14:00

## 2025-01-31 RX ADMIN — METOPROLOL TARTRATE 25 MG: 25 TABLET, FILM COATED ORAL at 21:16

## 2025-01-31 RX ADMIN — EPHEDRINE SULFATE 15 MG: 50 INJECTION INTRAVENOUS at 13:01

## 2025-01-31 NOTE — PROGRESS NOTES
EP LAB to Recovery Room Report    Procedure: A-Fib Ablation, Watchman, LALO    MD: MD Maria Teresa  Pre-procedure heart rhythm: Normal Sinus  Rhythm   Verbal Report given to Recovery Nurse on patient being transferred to Recovery Room for routine post-op. Patient stable upon transfer to .  Pt had general sedation with Anesthesia team, who managed MAR, vitals, and airway. Vitals, mental status, MAR, procedural summary discussed with recovery RN.     Patient dentures were removed and placed in denture cup with patient label and placed on stretcher with other patient belongings prior to procedure.    Patient cardioverted x1 during procedure.        Post-procedure heart rhythm: Sinus Fransisco  Rhythm      Sheaths:    Right femoral vein 15 fr sheath removed at 1540, closed with perclose and Figure-8 stitch.    Left femoral vein 11fr sheath removed at 1544, closed with perclose.  Left femoral vein 8fr sheath removed at 1546, closed with perclose.

## 2025-01-31 NOTE — ANESTHESIA PRE PROCEDURE
Department of Anesthesiology  Preprocedure Note       Name:  Darshana Kraus   Age:  74 y.o.  :  1950                                          MRN:  [id number]         Date:  2025      Surgeon: Surgeon(s):  Quoc Olguin MD    Procedure: Procedure(s):  Ablation A-fib w complete ep study  Sunil during cath case  Watchman chris closure device  Intracardiac echocardiogram  Drug stimulation  Intravascular ultrasound  Ep 3d mapping    Medications prior to admission:   Prior to Admission medications    Medication Sig Start Date End Date Taking? Authorizing Provider   valsartan (DIOVAN) 80 MG tablet Take 1 tablet by mouth 2 times daily   Yes Dakota Gee MD   metFORMIN (GLUCOPHAGE-XR) 500 MG extended release tablet Take 1 tablet by mouth daily   Yes Dakota Gee MD   metoprolol tartrate (LOPRESSOR) 25 MG tablet Take 1 tablet by mouth twice daily 25  Yes Yeison Barrett MD   warfarin (COUMADIN) 2.5 MG tablet Take 0.5 tablet (1.25 mg) by mouth every Wednesday. Take 1 tablet (2.5 mg) all other days of the week. Or take as directed by the clinic. 24  Yes Quoc Olguin MD   warfarin (COUMADIN) 5 MG tablet Take 1 tablet by mouth daily 10/28/24  Yes Quoc Olguin MD   gabapentin (NEURONTIN) 300 MG capsule Take 1 capsule by mouth 3 times daily as needed.   Yes ProviderDakota MD   acetaminophen (TYLENOL 8 HOUR ARTHRITIS PAIN) 650 MG extended release tablet Take 1 tablet by mouth every 8 hours as needed for Pain   Yes ProviderDakota MD   atorvastatin (LIPITOR) 20 MG tablet Take 1 tablet by mouth daily   Yes Automatic Reconciliation, Ar   levothyroxine (SYNTHROID) 75 MCG tablet Take 1 tablet by mouth every morning (before breakfast)   Yes Automatic Reconciliation, Ar   OXcarbazepine (TRILEPTAL) 300 MG tablet Take 1 tablet by mouth 2 times daily   Yes Automatic Reconciliation, Ar   oxybutynin (DITROPAN-XL) 5 MG extended release tablet Take 1 tablet by mouth daily   Yes Automatic

## 2025-01-31 NOTE — ANESTHESIA PROCEDURE NOTES
Procedure Performed: LALO       Start Time:        End Time:      Anesthesia Information  Anesthesiologist:  Oseas Gillespie MD        Preanesthesia Checklist:  Patient identified, IV assessed, risks and benefits discussed, monitors and equipment assessed, procedure being performed at surgeon's request and anesthesia consent obtained.    General Procedure Information  Diagnostic Indications for Echo:  assessment of ascending aorta, assessment of surgical repair and assessment of valve function  Location performed:  OR  Intubated  Probe Insertion:  Easy  Probe Type:  3D and mulitplane  Modalities:  2D, color flow mapping, continuous wave Doppler and pulse wave Doppler    Echocardiographic and Doppler Measurements    Ventricles    Ventricle  Cavity Size  Cavity          Dimension  Hypertrophy  Thrombus  Global FXN  EF    RV  normal    No  No  mildly impaired  55%    LV  normal    Yes    mildly impaired  55%       Ventricular Regional Function:  1- Basal Anteroseptal:  normal  2- Basal Anterior:  normal  3- Basal Anterolateral:  normal  4- Basal Inferolateral:  normal  5- Basal Inferior:  normal  6- Basal Inferoseptal:  normal  7- Mid Anteroseptal:  normal  8- Mid Anterior:  normal  9- Mid Anterolateral:  normal  10- Mid Inferolateral:  normal  11- Mid Inferior:  normal  12- Mid Inferoseptal:  normal  13- Apical Anterior:  normal  14- Apical Lateral:  normal  15- Apical Inferior:  normal  16- Apical Septal:  normal  17- [place]:  normal        Valves     Valves  Annulus  Stenosis Measurements   Regurg  Leaflet   Morph  Leaflet   Motion Valve Comments    Aortic normal Stenosis none.            none   normal normal       Mitral normal none             mild      Tricuspid dilated          mild        Pulmonic normal                      Aorta     Aorta  Size  Diam(cm)  Dissection Pemberton Classification    Ascending normal         Arch normal        Descending normal                  Atria     Size  SEC (smoke)  Thrombus  Tumor

## 2025-01-31 NOTE — H&P
Office note reviewed from 10/28/24. No interim changes.    We discussed multiple therapeutic options for the treatment of atrial fibrillation, including undergoing an atrial fibrillation/left atrial antral isolation ablation. The details of the procedure and risks associated with undergoing an atrial fibrillation ablation were discussed in detail including, but not limited to, death, myocardial ischemia, stroke, cardiac perforation, pulmonary vein stenosis, diaphragmatic paralysis via phrenic nerve injury, catheter entrapment in the mitral valve or other location, bleeding, infection, deep vein thrombosis, vascular injury, and worsening atrial arrhythmias. We also discussed that there is a low risk of possible esophageal ulceration, atrial esophageal fistula, atrial bronchial fistula, or another complication requiring the need for major surgery to address. Shared decision making was performed.  All questions were answered to patient's satisfaction and patient expressed verbal understanding of the procedure, the benefits, and risks. Darshana Kraus wishes to proceed with undergoing atrial fibrillation ablation procedure.    The patient has non-valvular AF with a CHADS2 score > 2 or [id number] > 3 (see above).  The patient is appropriate for short-term treatment with anticoagulation but unsuitable for long-term anticoagulation. We had a shared-decision making discussion about risk of thrombosis from AF and risk of bleeding from anticoagulation using a decision-making tool to calculate their risk and discussed appropriate rationale for Watchman left atrial appendage closure as an alternative. www.healthdecision.org/tool.html#/tool/afib.  I discussed in detail with patient risk and benefits and alternatives of the procedure.  Risk discussed with patient including but not limited to stroke, heart attack, death, perforation, pericardial effusion, cardiac tamponade, need for cardiac surgery, vascular complications,  \"[id number]\"      Thank you for involving me in this patient's care and please call with further concerns or questions.      I independently reviewed internal and external records of most recent labs, EKGs, event monitors or Holters, and imaging including most recent echocardiograms and stress test. Ordered follow up testing as indicated in orders and patient instructions. Previous notes from consultants and PCP are reviewed as well as pertinent prior admission documents. Time was spent with patient education, counseling, review of external records and care coordination independent of time spent on EMR.    ________________________________________  Quoc Olguin MD  Cardiac Electrophysiology  [facility] [place]  [address]                         [place] [address] [phone]     [address] [address]  [place] [address] [phone]

## 2025-01-31 NOTE — PROGRESS NOTES
Cardiac Cath Lab Recovery Arrival Note:      Darshana Kraus arrived to Cardiac Cath Lab, Recovery Area. Staff introduced to patient. Patient identifiers verified with NAME and DATE OF BIRTH. Procedure verified with patient. Consent forms reviewed and signed by patient or authorized representative and verified. Allergies verified.     Patient and family oriented to department. Patient and family informed of procedure and plan of care.     Questions answered with review. Patient prepped for procedure, per orders from physician, prior to arrival.    Patient on cardiac monitor, non-invasive blood pressure, SPO2 monitor. On Room Air. Patient is A&Ox 4. Patient reports No Pain.     Patient in stretcher, in low position, with side rails up, call bell within reach, patient instructed to call if assistance as needed.    Patient prep in: [facility] Recovery Area, [place] FT 1.   Patient family : Al / spouse [phone]  Family in: Waiting Room .   Prep by: Halima Quach RN

## 2025-01-31 NOTE — PLAN OF CARE
Problem: Safety - Adult  Goal: Free from fall injury  Outcome: Progressing     Problem: Discharge Planning  Goal: Discharge to home or other facility with appropriate resources  Outcome: Progressing  Flowsheets (Taken 1/31/2025 7750)  Discharge to home or other facility with appropriate resources:   Identify barriers to discharge with patient and caregiver   Arrange for needed discharge resources and transportation as appropriate   Identify discharge learning needs (meds, wound care, etc)     Problem: ABCDS Injury Assessment  Goal: Absence of physical injury  Outcome: Progressing

## 2025-01-31 NOTE — PROGRESS NOTES
..4 Eyes Skin Assessment     NAME:  Darshana Kraus  YOB: 1950  MEDICAL RECORD NUMBER:  [id number]    The patient is being assessed for  Admission    I agree that at least one RN has performed a thorough Head to Toe Skin Assessment on the patient. ALL assessment sites listed below have been assessed.      Areas assessed by both nurses:    Head, Face, Ears, Shoulders, Back, Chest, Arms, Elbows, Hands, Sacrum. Buttock, Coccyx, Ischium, Legs. Feet and Heels, and Under Medical Devices         Does the Patient have a Wound? No noted wound(s)  Redness sacrum and bruise midline back  Right and Left femoral access of the watchman procedure       Jose Prevention initiated by RN: No  Wound Care Orders initiated by RN: YES    Pressure Injury (Stage 3,4, Unstageable, DTI, NWPT, and Complex wounds) if present, place Wound referral order by RN under : No    New Ostomies, if present place, Ostomy referral order under : No     Nurse 1 eSignature: Electronically signed by Antony Hewitt RN on 1/31/25 at 5:41 PM EST    **SHARE this note so that the co-signing nurse can place an eSignature**    Nurse 2 eSignature: Electronically signed by SOPHIE HURLEY RN on 1/31/25 at 6:03 PM EST

## 2025-01-31 NOTE — PROGRESS NOTES
Pharmacist Note - Warfarin Dosing  Consult provided for this 74 y.o.female to manage warfarin for Atrial Fibrillation    INR Goal: 2 - 3    Home regimen/ tablet size: 2.5 mg Monday,Wednesday,Friday 1.25 mg rest of week (per 1/27/25 anticoag note)    Drugs that may increase INR: None  Drugs that may decrease INR: None  Other current anticoagulants/ drugs that may increase bleeding risk: Aspirin  Risk factors: Age > 65  Daily INR ordered through: x99 days    Recent Labs     01/31/25  1055 01/31/25  1100 01/31/25  1102   HGB 10.2*  --   --    INR  --  3.8 3.1*     Date               INR                  Dose  1/31  3.1  2.5 mg                                                                                Assessment/ Plan:  Will order warfarin 2.5 mg PO x 1 dose.    Pharmacy will continue to monitor daily and adjust therapy as indicated.  Please contact the pharmacist at [phone] or [phone] for outpatient recommendations if needed.

## 2025-01-31 NOTE — PROGRESS NOTES
Cardiac Cath Lab Procedure Area Arrival Note:    Darshana Kraus arrived to Cardiac Cath Lab, Procedure Area. Patient identifiers verified with NAME and DATE OF BIRTH. Procedure verified with patient. Consent forms verified. Allergies verified. Patient informed of procedure and plan of care. Questions answered with review. Patient voiced understanding of procedure and plan of care.    Patient on cardiac monitor, non-invasive blood pressure, SPO2 monitor. On room air with airway managed by anesthesia.  Patient status doing well without problems. Patient is A&Ox 4. Patient reports no CP or SOB.     Patient medicated during procedure by anesthesia team.    Refer to patients Cardiac Cath Lab PROCEDURE REPORT for vital signs, assessment, status, and response during procedure, printed at end of case. Printed report on chart or scanned into chart.

## 2025-01-31 NOTE — DISCHARGE INSTRUCTIONS
Atrial Fibrillation Ablation   Discharge Instructions      You have just had an Atrial Fibrillation Ablation. There were catheters temporarily placed in your heart through a puncture in the veins and/or arteries in your groin.        WHAT TO EXPECT     If you have had an Atrial Fibrillation Ablation please be aware that you may experience mild chest pain that will resolve within 24-48 hours.  If the chest pain persists or becomes severe, please call the office.    Mild to moderate, non-painful, bruising or mild swelling at the puncture site is not uncommon; it should resolve in 7 - 14 days, and may extend down your thigh as it heals. Application of ice to the site may help with any tenderness.    You have a small gauze dressing applied to the puncture site in your groin.  You may remove this the following morning.     It is not uncommon to feel palpitations during the healing phase after your ablation. If you feel as though you are having recurrence of atrial fibrillation lasting longer than 30 minutes, please contact the office.    Palpitations/AFIB can occur during the healing phase (1-2 months) post ablation.      MEDICATIONS     Start Pantoprazole 40 mg twice daily x 30 days post ablation.  A prescription has been sent electronically to your pharmacy on record.  Please pick it up & take as directed.  Start Sucralfate (Carafate) 1 gm twice daily x 30 days post ablation. A prescription has been sent electronically to your pharmacy on record.  Please pick it up & take as directed.  Start Aspirin 81 mg daily.  Continue Warfarin  Continue other medications as previously prescribed.      ACTIVITY     A responsible adult must take you home.  Do not drive a car for 24 hours.    Rest quietly for the remainder of the day.  Do not lift anything greater than 10 pounds for 7 days.  Limit bending at the puncture site and minimize use of stairs for at least 2 days.  You may remove the bandage and shower the morning after the  procedure.  Do not take a bath for 3 days.        SYMPTOMS THAT NEED TO BE REPORTED IMMEDIATELY     Bleeding at the puncture site.  If there is bleeding, lie down and hold firm direct pressure for at least 5 minutes.  If the bleeding does not stop, go to the closest emergency room, or call 911.    Temperature more than 100.5 F.  Redness or warmth at the puncture site.  Increasing pain, numbness, coolness or blue discoloration of the extremity where the puncture is located.  Pulsating mass at the puncture site.  A new “lump” at the puncture site, or increasing swelling at the site. Please be aware that a “pea” or “marble” sized lump is normal, anything larger or increasing in size should be reported.  Bruising at the puncture site that enlarges or becomes painful (some bruising at the site is common and will go away in 7-14 days).  Dizziness, lightheadedness, fainting.    REMEMBER: If you feel something is an emergency or cannot be handled over the phone, call 911 or go to the closest emergency room.                Left Atrial Appendage Occlusion (WATCHMAN)   Discharge Instructions      You have just underwent left atrial appendage occlusion utilizing a WATCHMAN device deployment.          There were catheters temporarily placed in your heart through a puncture in the veins in your groin.        WHAT TO EXPECT     If you have had a WATCHMAN procedure please notify Dr. Nichols immediately if you experience chest discomfort, shortness of breath or feeling like you are going to pass out. If the chest pain persists or becomes severe, please call the office.    Mild to moderate, non-painful, bruising or swelling at the puncture site is not un-common, and will resolve in 7 - 10 days.    You have a small gauze dressing applied to the puncture site in your groin.  You may remove this the following morning.     If you have any dental cleanings/procedures in the next 6 months, you will need prophylactic antibiotics beforehand.   Please contact our office if that's the case.      ACTIVITY     A responsible adult must take you home.  Do not drive a car for 72 hours.    Rest quietly for the remainder of the day.  Do not lift anything greater than 10 pounds for 7 days.  Limit bending at the puncture site and use of stairs for at least 2 days.  You may remove the bandage and shower the morning after the procedure.  Do not take a bath for 3 days.      SYMPTOMS THAT NEED TO BE REPORTED IMMEDIATELY     Bleeding at the puncture site.  If there is bleeding, lie down and hold firm direct pressure for at least 5 minutes.  If the bleeding does not stop, go to the closest emergency room, or call 911.    Temperature more than 100.5 F.  Redness or warmth at the puncture site.  Increasing pain, numbness, coolness or blue discoloration of the extremity where the puncture is located.  Pulsating mass at the puncture site.  A new “lump” at the puncture site, or increasing swelling at the site.  Bruising at the puncture site that enlarges or becomes painful (some bruising at the site is common and will go away in 7 - 10 days).  Dizziness, lightheadedness, fainting.    REMEMBER: If you feel something is an emergency or cannot be handled over the phone, call 911 or go to the closest emergency room.      FOLLOW UP CARE     - Follow up with Dr. Olguin on 2/24/2025.   - LALO 45 days after Watchman (This will be scheduled at your follow-up visit with Dr. Olguin).       Future Appointments   Date Time Provider Department Center   2/19/2025  7:30 AM MRM MEDICATION MGMT [facility]   2/24/2025 10:20 AM Quoc Olguin MD [facility] BS AMB   3/31/2025  8:00 AM Tammie Shanks ANP NEUM[facility] BS AMB   5/29/2025  9:40 AM Fernie Florence MD [facility] BS AMB           Quoc Olguin MD  Cardiac Electrophysiology / Cardiology    [facility] [place]  [facility]  [address] [address] [facility] [address], [address]   [address] [place] [address]

## 2025-01-31 NOTE — PROGRESS NOTES
TRANSFER - OUT REPORT:    Verbal report given to LIGIA Clarke on Darshana Kraus  being transferred to  for routine progression of patient care       Report consisted of patient's Situation, Background, Assessment and   Recommendations(SBAR).     Information from the following report(s) Nurse Handoff Report, Adult Overview, Surgery Report, Intake/Output, MAR, Recent Results, and Cardiac Rhythm Sinus  was reviewed with the receiving nurse.           Lines:   Peripheral IV 01/31/25 Left Arm (Active)   Site Assessment Clean, dry & intact 01/31/25 1046   Line Status Blood return noted 01/31/25 1046   Line Care Connections checked and tightened 01/31/25 1046   Phlebitis Assessment No symptoms 01/31/25 1046   Infiltration Assessment 0 01/31/25 1046   Alcohol Cap Used Yes 01/31/25 1046   Dressing Status Clean, dry & intact 01/31/25 1046   Dressing Type Transparent 01/31/25 1046       Peripheral IV 01/31/25 Right Arm (Active)        Opportunity for questions and clarification was provided.      Patient transported with:  Monitor and Registered Nurse, patient's belongings, clothing, shoes, watchman card, lunch box, cell phone

## 2025-01-31 NOTE — PROCEDURES
ELECTROPHYSIOLOGY PROCEDURE     PROCEDURE DATE: 1/31/2025    PROCEDURES:    Paroxysmal Atrial fibrillation RF Ablation (QDOT)  Bilateral Pulmonary Vein Isolation - Wide Area Circumferential   Bilateral carinal line  EP study with CS catheter placement and pacing   Single transseptal puncture   Right and left heart catheterization   Intracardiac echocardiography   Ultrasound for venous access   Three-dimensional intracardiac electroanatomic mapping    Left atrial pacing/recording   Esophageal Cooling with Enso ETM balloon   Acute drug testing/IV Drug-Isuprel Administration   External direct current cardioversion   General endotracheal anesthesia    Left atrial appendage occlusion with Watchman device.  See below        Patient updated history with ablation and watchman:    # Persistent Atrial Fibrillation  [id number] score of 3 [for HTN, Age-1, and Female]  Anticoagulation: starting warfarin  Onset: 2022 when she visited son in [place]  Symptoms: Previously she has felt fatigued and generally not well in atrial fibrillation and she was initially diagnosed in 2022  Alcohol: Yes  SHIRLEY: can assess  Prior AAD: current amio restarted early 2024  Current Meds/AAD: Amiodarone   Prior Cardioversion: unclear  Ablation: 1/31/2025 bilateral PVI-WACA. Multiple epicardial connections in PVs which needed ablation.  LA size: Poorly visualized.  Dilated.  --TTE April 2024: LVEF 55-60%      # Anticoagulation  # Anemia  # History of Falls  # Alcohol Abuse  She used to drink a case of beer a day.  She reports she quit drinking in Sept 2024  --Counseled on continued alcohol cessation  --could not afford Eliquis.  --On warfarin for anticoagulation  --1/31/2025 s/p Watchman FLX pro with 27 mm device        BRIEF SUMMARY:   74-year-old female with history of persistent atrial fibrillation requiring amiodarone.  She was brought to EP lab for scheduled AF ablation procedure and Watchman device implantation at the same setting. She was  LAB PHYSICIAN: Quoc Olguin MD    INTRAPROCEDURAL LALO:  Oseas Gillespie MD     INDICATION:     Anemia, history of falls, alcohol abuse, poor candidate for long-term oral anticoagulation  Persistent atrial fibrillation    CONSENT:  The purpose and nature of the procedure, potential benefits and risks, and available alternatives have been described to the patient in detail.  All questions were answered.? The patient expressed understanding, agreed to proceed and provided written informed consent.      PRE-PROCEDURE IMAGING:  cardiac CT      Left atrial appendage measurements (width x depth in mm)         PRESENTING RHYTHM:  Sinus rhythm      TIME OUT:  Time out was completed with verification of the correct patient identity and the procedure to be performed.      ANESTHESIA:  General endotracheal anesthesia was provided by Anesthesiology Service.      SETUP:  The patient was brought to the EP Lab in a fasting state.  Cardiac rhythm, blood pressure, heart rate, respirations, oxygen saturation and level of consciousness were monitored prior to, throughout and after the procedure.  The patient was placed supine on the fluoroscopy table, prepped and draped in the usual sterile fashion.  Preprocedure antibiotic was administered.      This procedure was performed immediately after AF ablation.  Patient already had venous access and transseptal access.  I then placed a protrack curly wire through the Agilis sheath in the left atrium to retain access.  I then exchanged the sheath for the watchman sheath.    ACCESS: Right femoral vein 15 Green[place]ic sheath     Transseptal access:    Was already in place for AF ablation.       WATCHMAN IMPLANT: Retaining  the Versacross wire in the left atrium, The Watchman sheath was advanced over the wire into the left atrium.  A pigtail catheter was advanced to the left atrial appendage.  Left atrial appendogram was performed by injecting contrast imaged in ROBLES caudal projection.  Left atrial

## 2025-01-31 NOTE — PROGRESS NOTES
TRANSFER - IN REPORT:    Verbal report received from LIGIA Smith and CRNA on Darshana Pack  being received from EP lab for routine post-op. Report consisted of patient’s Situation, Background, Assessment and Recommendations(SBAR). Information from the following report(s) SBAR, Procedure Summary, Intake/Output, MAR, Recent Results, Med Rec Status, and Cardiac Rhythm Sinus  was reviewed with the receiving clinician. Opportunity for questions and clarification was provided. Assessment completed upon patient’s arrival to Cardiac Cath Lab RECOVERY AREA and care assumed.       Cardiac Cath Lab Recovery Arrival Note:    Darshana Kraus arrived to [facility] recovery area.  Patient procedure= LALO/Afib ablation/watchman. Patient on cardiac monitor, non-invasive blood pressure, SPO2 monitor. On room air.  IV  of NS on pump at 25 ml/hr. Patient status doing well without problems. Patient is A&Ox 4. Patient reports needing to urinate. Bedpan provided.    PROCEDURE SITE CHECK:    Right femoral vein Procedure site:without any bleeding or hematoma, no pain/discomfort reported at procedure site.     Left femoral vein Procedure site:without any bleeding or hematoma, no pain/discomfort reported at procedure site.     No change in patient status. Continue to monitor patient and status.         1645-drink provided, tolerated PO well, no nausea.

## 2025-02-01 ENCOUNTER — APPOINTMENT (OUTPATIENT)
Facility: HOSPITAL | Age: 75
DRG: 317 | End: 2025-02-01
Attending: HOSPITALIST
Payer: MEDICARE

## 2025-02-01 VITALS
WEIGHT: 188 LBS | RESPIRATION RATE: 18 BRPM | TEMPERATURE: 98.3 F | DIASTOLIC BLOOD PRESSURE: 63 MMHG | HEART RATE: 63 BPM | BODY MASS INDEX: 29.51 KG/M2 | HEIGHT: 67 IN | SYSTOLIC BLOOD PRESSURE: 123 MMHG | OXYGEN SATURATION: 98 %

## 2025-02-01 LAB
ECHO AO ROOT DIAM: 3.2 CM
ECHO AO ROOT INDEX: 1.62 CM/M2
ECHO BSA: 1.93 M2
ECHO LA DIAMETER INDEX: 2.39 CM/M2
ECHO LA DIAMETER: 4.7 CM
ECHO LA TO AORTIC ROOT RATIO: 1.47
ECHO LV EF PHYSICIAN: 55 %
ECHO LV FRACTIONAL SHORTENING: 30 % (ref 28–44)
ECHO LV INTERNAL DIMENSION DIASTOLE INDEX: 2.39 CM/M2
ECHO LV INTERNAL DIMENSION DIASTOLIC: 4.7 CM (ref 3.9–5.3)
ECHO LV INTERNAL DIMENSION SYSTOLIC INDEX: 1.68 CM/M2
ECHO LV INTERNAL DIMENSION SYSTOLIC: 3.3 CM
ECHO LV IVSD: 1.2 CM (ref 0.6–0.9)
ECHO LV MASS 2D: 212 G (ref 67–162)
ECHO LV MASS INDEX 2D: 107.6 G/M2 (ref 43–95)
ECHO LV POSTERIOR WALL DIASTOLIC: 1.2 CM (ref 0.6–0.9)
ECHO LV RELATIVE WALL THICKNESS RATIO: 0.51
ECHO LVOT AREA: 4.2 CM2
ECHO LVOT DIAM: 2.3 CM
ECHO PERICARDIUM POSTERIOR DIMENSION: 0.8 CM
GLUCOSE BLD STRIP.AUTO-MCNC: 88 MG/DL (ref 65–117)
GLUCOSE BLD STRIP.AUTO-MCNC: 89 MG/DL (ref 65–117)
INR PPP: 3.7 (ref 0.9–1.1)
PROTHROMBIN TIME: 36 SEC (ref 9.2–11.2)
SERVICE CMNT-IMP: NORMAL
SERVICE CMNT-IMP: NORMAL

## 2025-02-01 PROCEDURE — 71045 X-RAY EXAM CHEST 1 VIEW: CPT

## 2025-02-01 PROCEDURE — 6370000000 HC RX 637 (ALT 250 FOR IP)

## 2025-02-01 PROCEDURE — 93308 TTE F-UP OR LMTD: CPT

## 2025-02-01 PROCEDURE — 99232 SBSQ HOSP IP/OBS MODERATE 35: CPT | Performed by: SPECIALIST

## 2025-02-01 PROCEDURE — 93308 TTE F-UP OR LMTD: CPT | Performed by: SPECIALIST

## 2025-02-01 PROCEDURE — 82962 GLUCOSE BLOOD TEST: CPT

## 2025-02-01 PROCEDURE — 93325 DOPPLER ECHO COLOR FLOW MAPG: CPT | Performed by: SPECIALIST

## 2025-02-01 PROCEDURE — 85610 PROTHROMBIN TIME: CPT

## 2025-02-01 PROCEDURE — 2500000003 HC RX 250 WO HCPCS

## 2025-02-01 RX ORDER — METOPROLOL SUCCINATE 25 MG/1
25 TABLET, EXTENDED RELEASE ORAL DAILY
Qty: 30 TABLET | Refills: 5 | Status: SHIPPED | OUTPATIENT
Start: 2025-02-01

## 2025-02-01 RX ORDER — FAMOTIDINE 20 MG/1
20 TABLET, FILM COATED ORAL 2 TIMES DAILY
Qty: 60 TABLET | Refills: 0 | Status: SHIPPED | OUTPATIENT
Start: 2025-02-01

## 2025-02-01 RX ORDER — METOPROLOL SUCCINATE 25 MG/1
25 TABLET, EXTENDED RELEASE ORAL DAILY
Status: DISCONTINUED | OUTPATIENT
Start: 2025-02-02 | End: 2025-02-01 | Stop reason: HOSPADM

## 2025-02-01 RX ADMIN — OXYBUTYNIN CHLORIDE 5 MG: 5 TABLET, EXTENDED RELEASE ORAL at 09:28

## 2025-02-01 RX ADMIN — SODIUM CHLORIDE, PRESERVATIVE FREE 10 ML: 5 INJECTION INTRAVENOUS at 09:28

## 2025-02-01 RX ADMIN — VALSARTAN 80 MG: 40 TABLET, FILM COATED ORAL at 09:27

## 2025-02-01 RX ADMIN — LEVOTHYROXINE SODIUM 75 MCG: 0.07 TABLET ORAL at 09:27

## 2025-02-01 RX ADMIN — METOPROLOL TARTRATE 25 MG: 25 TABLET, FILM COATED ORAL at 09:26

## 2025-02-01 RX ADMIN — METFORMIN HYDROCHLORIDE 500 MG: 500 TABLET, EXTENDED RELEASE ORAL at 10:19

## 2025-02-01 RX ADMIN — ASPIRIN 81 MG CHEWABLE TABLET 81 MG: 81 TABLET CHEWABLE at 09:28

## 2025-02-01 RX ADMIN — FAMOTIDINE 20 MG: 20 TABLET, FILM COATED ORAL at 09:27

## 2025-02-01 RX ADMIN — ATORVASTATIN CALCIUM 20 MG: 20 TABLET, FILM COATED ORAL at 09:28

## 2025-02-01 RX ADMIN — OXCARBAZEPINE 300 MG: 150 TABLET, FILM COATED ORAL at 09:27

## 2025-02-01 RX ADMIN — SUCRALFATE 1 G: 1 TABLET ORAL at 07:25

## 2025-02-01 NOTE — DISCHARGE SUMMARY
Cardiology Discharge Summary     Patient ID:  Darshana Kraus  [id number]  74 y.o.  1950    Admit Date: 1/31/2025    Discharge Date: 2/1/25    Admitting Physician: Quoc Olguin MD     Discharge Physician: Donna Marsh    Admission Diagnoses:   Paroxysmal atrial fibrillation (HCC) [I48.0]  Sick sinus syndrome (HCC) [I49.5]  Presence of Watchman left atrial appendage closure device [Z95.818]    Discharge Diagnoses:   Principal Problem:    Presence of Watchman left atrial appendage closure device  Active Problems:    Paroxysmal atrial fibrillation (HCC)    Sick sinus syndrome (HCC)  Resolved Problems:    * No resolved hospital problems. *      Discharge Condition: Stable    Cardiology Procedures this Admission:  EP study ablation and Watchman implant    Consults: none    Hospital Course:   Briefly hospitalized for elective A-fib ablation and placement of Watchman implant for atrial fibrillation.  Uncomplicated postoperative course.  Patient in sinus rhythm without recurrence of atrial fibrillation.  Sinus bradycardia in the 50s so we will reduce home beta-blocker dose by 50% at time of discharge.  Echocardiogram demonstrates very small focal pericardial effusion.  She will remain on Coumadin (INR goal 2-3) and aspirin and follow-up with Dr. Olguin as planned on 2/24/25.    The suture in the right groin was removed. Wound care and activity instructions given.      Assessment/Plan/Discussion:     Patient seen on the day of progress note and examined  and agree with Advance Practice Provider (AURORA, NP,PA)  assessment and plans as amended.   Darshana Kraus  74 y.o.  Patient doing well this morning sutures removed.  Echo was done EF is 55 to 60% with mild LVH and moderate aortic sclerosis consistent with previous.  Aorta is not as well-seen in terms of velocities previous echoes have shown stable sclerosis without stenosis.  Left atrial size of 4.7.  Patient has a very focal pericardial effusion present at the right

## 2025-02-01 NOTE — PLAN OF CARE
Problem: Safety - Adult  Goal: Free from fall injury  Outcome: Progressing     Problem: Discharge Planning  Goal: Discharge to home or other facility with appropriate resources  Outcome: Progressing  Flowsheets (Taken 2/1/2025 0800)  Discharge to home or other facility with appropriate resources:   Identify barriers to discharge with patient and caregiver   Arrange for needed discharge resources and transportation as appropriate   Identify discharge learning needs (meds, wound care, etc)     Problem: ABCDS Injury Assessment  Goal: Absence of physical injury  Outcome: Progressing

## 2025-02-01 NOTE — PROGRESS NOTES
Patient discharge order placed by . AVS reviewed and questions answered by LIGIA Clarke. IV removed by LIGIA Clarke. Educations regarding medications, heart attack symptoms, stroke symptoms, and warfarin reviewed. Patient discharged home via wheel chair along with the patient's belongings. Patient were stable at the time of discharge.

## 2025-02-01 NOTE — PROGRESS NOTES
Pharmacist Note - Warfarin Dosing  Consult provided for this 74 y.o.female to manage warfarin for Atrial Fibrillation    INR Goal: 2 - 3    Home regimen/ tablet size: 2.5 mg Monday,Wednesday,Friday 1.25 mg rest of week (per 1/27/25 anticoag note)    Drugs that may increase INR: None  Drugs that may decrease INR: None  Other current anticoagulants/ drugs that may increase bleeding risk: Aspirin  Risk factors: Age > 65  Daily INR ordered through: 5/10/25    Recent Labs     01/31/25  1055 01/31/25  1100 01/31/25  1102 02/01/25  0714   HGB 10.2*  --   --   --    INR  --  3.8 3.1* 3.7*     Date               INR                  Dose  1/31  3.1  2.5 mg   2/1                   3.7                  HOLD                                                                               Assessment/ Plan:  Will HOLD the warfarin today for an INR of 3.7.    Pharmacy will continue to monitor daily and adjust therapy as indicated.  Please contact the pharmacist at [phone] or [phone] for outpatient recommendations if needed.

## 2025-02-03 LAB — ECHO BSA: 1.93 M2

## 2025-02-05 NOTE — ANESTHESIA POSTPROCEDURE EVALUATION
Department of Anesthesiology  Postprocedure Note    Patient: Darshana Kraus  MRN: [id number]  YOB: 1950  Date of evaluation: 2/5/2025    Procedure Summary       Date: 01/31/25 Room / Location: [facility] CATH LAB 2 / [facility] CARDIAC CATH LAB    Anesthesia Start: 1202 Anesthesia Stop: 1604    Procedures:       Ablation A-fib w complete ep study      Sunil during cath case      Watchman chris closure device      Intracardiac echocardiogram      Drug stimulation      Ep 3d mapping      Ultrasound guided vascular access Diagnosis:       Paroxysmal atrial fibrillation (HCC)      Sick sinus syndrome (HCC)      (Paroxysmal atrial fibrillation (HCC) [I48.0])      (Sick sinus syndrome (HCC) [I49.5])    Providers: Quoc Olguin MD Responsible Provider: Oseas Gillespie MD    Anesthesia Type: General ASA Status: 3            Anesthesia Type: General    Mali Phase I: Mali Score: 10    Mali Phase II:      Anesthesia Post Evaluation    Patient location during evaluation: PACU  Patient participation: complete - patient participated  Level of consciousness: responsive to verbal stimuli and sleepy but conscious  Pain score: 2  Airway patency: patent  Cardiovascular status: blood pressure returned to baseline  Respiratory status: acceptable  Hydration status: stable  Comments: +Post-Anesthesia Evaluation and Assessment    Patient: Darshana Kraus MRN: [id number]  SSN: [id number]   YOB: 1950  Age: 74 y.o.  Sex: female          Cardiovascular Function/Vital Signs    /63   Pulse 63   Temp 98.3 °F (36.8 °C) (Oral)   Resp 18   Ht 1.702 m (5' 7\")   Wt 85.3 kg (188 lb)   SpO2 98%   Breastfeeding No   BMI 29.44 kg/m²     Patient is status post Procedure(s):  Ablation A-fib w complete ep study  Sunil during cath case  Watchman chris closure device  Intracardiac echocardiogram  Drug stimulation  Ep 3d mapping  Ultrasound guided vascular access.    Nausea/Vomiting: Controlled.    Postoperative hydration reviewed and

## 2025-02-19 ENCOUNTER — TELEPHONE (OUTPATIENT)
Facility: HOSPITAL | Age: 75
End: 2025-02-19

## 2025-02-19 NOTE — TELEPHONE ENCOUNTER
[facility]     Contacted patient regarding INR check appointment this morning. Patient requested to cancel appointment due to inclement weather. Patient's appointment has been rescheduled to next Wednesday (2/26/25).    Thank you.  Sakshi Chino, PharmD

## 2025-02-20 NOTE — PROGRESS NOTES
tablet Take 1 tablet by mouth daily      aspirin 81 MG chewable tablet Take 1 tablet by mouth daily (Patient not taking: Reported on 2/25/2025) 30 tablet 3    hydroCHLOROthiazide 12.5 MG tablet Take 1 tablet by mouth daily      Cyanocobalamin (VITAMIN B 12 PO) Take 5,000 mg by mouth daily (Patient not taking: Reported on 1/31/2025)      folic acid (FOLVITE) 1 MG tablet Take 1 tablet by mouth Every Day (Patient not taking: Reported on 1/9/2025)      venlafaxine (EFFEXOR XR) 75 MG extended release capsule  (Patient not taking: Reported on 1/9/2025)      latanoprost (XALATAN) 0.005 % ophthalmic solution  (Patient not taking: Reported on 8/1/2024)      clotrimazole-betamethasone (LOTRISONE) 1-0.05 % cream Apply topically 2 times daily Apply topically 2 times daily. (Patient not taking: Reported on 8/1/2024)      Triprolidine-Pseudoephedrine (ANTIHISTAMINE PO) Take 25 mg by mouth in the morning and at bedtime (Patient not taking: Reported on 1/9/2025)      venlafaxine (EFFEXOR XR) 37.5 MG extended release capsule Take 1 capsule by mouth daily for 14 days (Patient not taking: Reported on 8/22/2024) 14 capsule 0    ARIPiprazole (ABILIFY) 10 MG tablet Take 1 tablet by mouth daily (Patient not taking: Reported on 1/31/2025)       No current facility-administered medications for this visit.          DEANDRA Zhong - NP  [facility]  [address]  [place], [place] [address] [phone]      CC:Zaid Conti DO

## 2025-02-24 DIAGNOSIS — I48.0 PAROXYSMAL ATRIAL FIBRILLATION (HCC): Primary | ICD-10-CM

## 2025-02-24 RX ORDER — WARFARIN SODIUM 5 MG/1
5 TABLET ORAL DAILY
Qty: 30 TABLET | Refills: 0 | OUTPATIENT
Start: 2025-02-24

## 2025-02-24 RX ORDER — WARFARIN SODIUM 2.5 MG/1
TABLET ORAL
Qty: 75 TABLET | Refills: 0 | Status: SHIPPED | OUTPATIENT
Start: 2025-02-24

## 2025-02-24 RX ORDER — WARFARIN SODIUM 5 MG/1
5 TABLET ORAL DAILY
Qty: 30 TABLET | Refills: 0 | Status: SHIPPED | OUTPATIENT
Start: 2025-02-24 | End: 2025-02-24 | Stop reason: ALTCHOICE

## 2025-02-24 NOTE — TELEPHONE ENCOUNTER
Refill per Sharif MORRISON. Post afib ablation 1/31    Future Appointments   Date Time Provider Department Center   2/25/2025  8:20 AM Bharati Correa APRN - NP CAV BS AMB   2/26/2025  7:30 AM MRM MEDICATION MGMT [facility]   3/31/2025  8:00 AM Tammie Shanks, ANP NEUMRSPBPBB BS AMB   5/29/2025  9:40 AM Fernie Florence MD CAVREY BS AMB

## 2025-02-24 NOTE — TELEPHONE ENCOUNTER
[facility] - Medication Refill    Received electronic request to refill patient's warfarin therapy. The warfarin 5 mg tablet prescription will be discontinued. Patient is currently using the warfarin 2.5 mg tablets. Called [facility] pharmacy and requested warfarin 5 mg tablets be returned to stock. Will enter a new prescription for warfarin 2.5 mg tablets.    Thank you.  Sakshi Chino, ZacheryD

## 2025-02-24 NOTE — TELEPHONE ENCOUNTER
[facility] - Medication Refill    Received electronic request to refill patient's warfarin therapy. E-prescribed warfarin 2.5 mg tablets to [facility].    Thank you.  Sakshi Chino, PharmD    For Pharmacy Admin Tracking Only    Intervention Detail: Refill(s) Provided  Total # of Interventions Recommended: 1  Total # of Interventions Accepted: 1  Time Spent (min): 10

## 2025-02-25 ENCOUNTER — OFFICE VISIT (OUTPATIENT)
Age: 75
End: 2025-02-25

## 2025-02-25 ENCOUNTER — TELEPHONE (OUTPATIENT)
Age: 75
End: 2025-02-25

## 2025-02-25 VITALS
DIASTOLIC BLOOD PRESSURE: 70 MMHG | WEIGHT: 187.8 LBS | SYSTOLIC BLOOD PRESSURE: 174 MMHG | OXYGEN SATURATION: 99 % | BODY MASS INDEX: 29.41 KG/M2 | HEART RATE: 70 BPM

## 2025-02-25 DIAGNOSIS — Z98.890 S/P ABLATION OF ATRIAL FIBRILLATION: ICD-10-CM

## 2025-02-25 DIAGNOSIS — I48.19 PERSISTENT ATRIAL FIBRILLATION (HCC): Primary | ICD-10-CM

## 2025-02-25 DIAGNOSIS — Z95.818 PRESENCE OF WATCHMAN LEFT ATRIAL APPENDAGE CLOSURE DEVICE: ICD-10-CM

## 2025-02-25 DIAGNOSIS — Z79.01 ANTICOAGULATED: ICD-10-CM

## 2025-02-25 DIAGNOSIS — I10 PRIMARY HYPERTENSION: ICD-10-CM

## 2025-02-25 DIAGNOSIS — Z86.79 S/P ABLATION OF ATRIAL FIBRILLATION: ICD-10-CM

## 2025-02-25 RX ORDER — METOPROLOL SUCCINATE 25 MG/1
25 TABLET, EXTENDED RELEASE ORAL DAILY
Qty: 30 TABLET | Refills: 5
Start: 2025-02-25

## 2025-02-25 NOTE — TELEPHONE ENCOUNTER
Left message for patient to return call to schedule procedure.    ----- Message from RN Crista HE RN sent at 2/25/2025 10:54 AM EST -----    ----- Message -----  From: Mukund Andrea RN  Sent: 2/25/2025  10:48 AM EST  To: Natali Lester; Crista Beck, RN; #    PLEASE SCHEDULE POST WATCHMAN LALO WITH LIZETH THE WEEK OF 3/17-3/21.  PATIENT OF DR. CID.    NO MEDS TO HOLD  LABS ORDERED  I48.19    THANK YOU!!!

## 2025-02-25 NOTE — TELEPHONE ENCOUNTER
Patient got both AF ablation and LALO.  Therefore, we will plan to continue warfarin for 3 months post ablation.    Follow-up LALO with LIZETH can be anytime between 6 weeks to 10 weeks postprocedure:  March 17th to April 11th

## 2025-02-25 NOTE — TELEPHONE ENCOUNTER
Spoke to patient and scheduled her for post watchman LALO with Dr. Florence on 3/21 @ 8:30 am with 7:00 am arrival. Instructions gone over with patient and advised to have labs between 3/3-3/17, advised no medications to hold. Patient verbalized understanding and had no questions at the time of call.    Patient identification verified x2.         Patient Instructions    LALO (Transesophageal Echocardiogram)  Cardioversion    Pre-procedure instructions  The night before the procedure nothing to eat or drink after midnight, you may take approved medications the morning of the procedure with a few sips of water.  Medication restrictions: No medications to hold.  Labs: Please do by 3/3-3/17  [facility] Draw Sites:  [facility] [place]: [address] [facility] [address]  [place]: [address] [address] [address]  [place]: [address] [facility] [address]  [facility]: [address] [facility] [address]  [place]: [address] [facility] [address]  [facility]: [address] [facility] [address]  [place]: [address] [facility] [address]  [facility]: [address]. [place] [address]    Procedure day  Have a  that will bring you and take you home  Have a responsible adult that can stay with you for 24hrs  Bring ID and insurance card  Wear comfortable clothing  Leave valuables at home, bring: dentures, hearing aids, or glasses  Bring overnight bag (just in case of an overnight stay)  Where to report  [facility]: go through main entrance and to the left is outpatient registration    Date of procedure: 3/21 w/ Dr. Florence  Arrival Time: 7:00 am    Post procedure instructions  No driving for 24 hours    Contact  [place] [address]

## 2025-02-25 NOTE — TELEPHONE ENCOUNTER
Verified patient with two identifiers. This RN called pt to inform her of Dr Olguin and Bharati MONTERO's recommendations of continuing to take Warfarin and ASA for 3+ month post procedure. Pt verbalized understanding and was instructed to call back with any questions or concerns.

## 2025-02-25 NOTE — PATIENT INSTRUCTIONS
SOMEONE WILL CALL YOU TO SCHEDULE YOUR POST WATCHMAN LALO    START TAKING METOPROLOL 25 MG TWICE A DAY.  CALL THE OFFICE WHEN YOU NEED A REFILL.    START TAKING ASPIRIN 81 MG DAILY    CHECK YOUR BLOOD PRESSURE DAILY FOR ONE WEEK AND KEEP A LOG OF YOUR READINGS.  CALL THE OFFICE IF YOUR SYSTOLIC BLOOD PRESSURE (TOP NUMBER) IS STILL CONSISTENTLY GREATER THAN 130 MMHG, OR IF YOU ARE STILL DIZZY.

## 2025-02-26 ENCOUNTER — ANTI-COAG VISIT (OUTPATIENT)
Facility: HOSPITAL | Age: 75
End: 2025-02-26
Payer: MEDICARE

## 2025-02-26 DIAGNOSIS — I48.0 PAROXYSMAL ATRIAL FIBRILLATION (HCC): Primary | ICD-10-CM

## 2025-02-26 LAB
INTERNATIONAL NORMALIZATION RATIO, POC: 5.2 (ref 2–3)
PROTHROMBIN TIME, POC: 62.9

## 2025-02-26 PROCEDURE — 85610 PROTHROMBIN TIME: CPT

## 2025-02-26 PROCEDURE — 99212 OFFICE O/P EST SF 10 MIN: CPT

## 2025-02-26 NOTE — PROGRESS NOTES
Pharmacy Progress Note - Anticoagulation Management    S/O:  Ms. Darshana Kraus  is a 74 y.o. female seen today for anticoagulation management for the diagnosis of Atrial Fibrillation.     HPI: At last visit (1/27), patient's INR was 2.9 and therapeutic for an INR goal of 2.0-3.0. Patient denied any changes to her medications and reported consistent intake of vitamin K foods. Patient has an upcoming AF ablation in addition to a Watchman implant on 1/31/25. Patient reported not taking amiodarone as instructed. Patient will continue current warfarin regimen of 2.5 mg Mon, Wed, Fri; 1.25 mg daily ROW and recheck INR in 3 week(s). Post Watchman implant, patient will continue warfarin and start ASA for 3 months and then switch to Plavix and ASA for 3 months.    Interim History:    Warfarin start date: 11/2/24 (previously on Eliquis, cost prohibitive)  INR Goal: 2.0-3.0    Current warfarin regimen: 2.5 mg Mon, Wed, Fri; 1.25 mg daily ROW  Warfarin tablet strength: 2.5 mg, 5 mg   Duration of therapy: Indefinite      Results for orders placed or performed in visit on 02/26/25   POCT INR   Result Value Ref Range    Prothrombin time,(POC) 62.9     INR,(POC) 5.2 (A) 2.0 - 3.0       Today's pertinent positives includes:  Medication change    Patient reports the addition of ASA 81 mg daily.    Adherence:   Able to recall regimen? NO - Patient reports not using the warfarin daily dose calendar. However, she reports taking warfarin 1.25 mg some days and warfarin 2.5 mg some days.   Miss/extra dose? NO  Need refill? NO    Upcoming procedure(s):  N/A    Past Medical History:   Diagnosis Date    Asthma     Atrial fibrillation (HCC)     Bipolar 1 disorder (HCC)     Essential hypertension     Pneumonia      Allergies   Allergen Reactions    Latex Swelling     Hands swell with latex gloves    Nexium [Esomeprazole] Anaphylaxis    Esomeprazole Magnesium Other (See Comments)    Hydrochlorothiazide Other (See Comments)     Severe hyponatremia

## 2025-02-26 NOTE — PATIENT INSTRUCTIONS
clot. This test is called a PT or prothrombin time test. The result of the test is called an INR level. Depending on the test results, your doctor or anticoagulation clinic may adjust your dose of warfarin.    Follow-up care is a key part of your treatment and safety. Be sure to make and go to all appointments, and call your doctor if you are having problems. It's also a good idea to know your test results and keep a list of the medicines you take.    How can you care for yourself at home?  Take warfarin safely  Take your warfarin at the same time each day.  If you miss a dose of warfarin, don't take an extra dose to make up for it. Your doctor can tell you exactly what to do so you don't take too much or too little.  Wear medical alert jewelry that lets others know that you take warfarin. You can buy this at most drugstores.  Don't take warfarin if you are pregnant or planning to get pregnant. Talk to your doctor about how you can prevent getting pregnant while you are taking it.  Don't change your dose or stop taking warfarin unless your doctor tells you to.  Effects of medicines and food on warfarin  Don't start or stop taking any medicines, vitamins, or natural remedies unless you first talk to your doctor. Many medicines can affect how warfarin works. These include aspirin and other pain relievers, over-the-counter medicines, multivitamins, dietary supplements, and herbal products.  Tell all of your doctors and pharmacists that you take warfarin. Some prescription medicines can affect how warfarin works.  Keep the amount of vitamin K in your diet about the same from day to day. Do not suddenly eat a lot more or a lot less food that is rich in vitamin K than you usually do. Vitamin K affects how warfarin works and how your blood clots. Talk with your doctor before making big changes in your diet. Vitamin K is in many foods, such as:  Leafy greens, such as kale, cabbage, spinach, Swiss chard, and lettuce.  [place]

## 2025-03-06 ENCOUNTER — ANTI-COAG VISIT (OUTPATIENT)
Facility: HOSPITAL | Age: 75
End: 2025-03-06
Payer: MEDICARE

## 2025-03-06 DIAGNOSIS — I48.0 PAROXYSMAL ATRIAL FIBRILLATION (HCC): Primary | ICD-10-CM

## 2025-03-06 LAB
INTERNATIONAL NORMALIZATION RATIO, POC: 1.8 (ref 2–3)
PROTHROMBIN TIME, POC: 21

## 2025-03-06 PROCEDURE — 85610 PROTHROMBIN TIME: CPT

## 2025-03-06 PROCEDURE — 99212 OFFICE O/P EST SF 10 MIN: CPT

## 2025-03-06 NOTE — PROGRESS NOTES
11/2      Medications that can increase  INR: levothyroxine, venlafaxine  Medications that can decrease INR: metformin    A/P:       Anticoagulation:  Considering Ms. Kraus's past history, todays findings, and per Anticoagulation Collaborative Practice Agreement/Protocol:    Fingerstick POC INR (1.8) is Subtherapeutic for INR goal today.   Change warfarin to 2.5 mg today (3/6/25) and then starting tomorrow (3/7/25) resume 2.5 mg Mon, Wed, Fri; 1.25 mg daily ROW  Patient's INR is 1.8 and subtherapeutic for an INR goal of 2.0-3.0. Patient denies any missed doses of warfarin and denies changes to her medications. Patient reports consistent intake of vitamin K foods. Patient reports not using the warfarin daily dose calendar. Patient reports alternating warfarin 1.25 mg with warfarin 2.5 mg. The clinic is unable to confirm the amount of warfarin patient has taken over the last 7 days. Therefore, patient will take warfarin 2.5 mg today (3/6/25) and then starting tomorrow (3/7/25) resume warfarin 2.5 mg Mon, Wed, Fri; 1.25 mg daily ROW. Patient will recheck INR in 2 week(s). Encouraged patient to use the warfarin daily dose calendar and micah dose on calendar after taken. Also, encouraged patient to bring the warfarin daily dose calendar with her to the next INR check appointment. Patient verbalized understanding.      Patient was instructed to schedule an appointment in 2 week(s) prior to leaving the clinic.    Medication reconciliation was completed during the visit.    There are no discontinued medications.      A full discussion of the nature of anticoagulants has been carried out.  A full discussion of the need for frequent and regular monitoring, precise dosage adjustment and compliance was stressed.  Side effects of potential bleeding were discussed and Ms. Kraus was instructed to call [phone] if there are any signs of abnormal bleeding.  Ms. Kraus was instructed to avoid any OTC items containing aspirin or

## 2025-03-06 NOTE — PATIENT INSTRUCTIONS
Today your INR was 1.8 .      Your goal INR is  2.0-3.0 .    You have a   2.5 mg tablet of Coumadin (warfarin).   Take Coumadin (warfarin) as follows:    Take 2.5 mg today (3/6/25) and then starting tomorrow (3/7/25) resume 2.5 mg every Monday, Wednesday, Friday; and 1.25 mg all other days of the week.    Come back in 2 week(s) for your next finger stick/INR blood test.        Avoid any over the counter items containing aspirin or ibuprofen, and avoid great swings in general diet.  Avoid alcohol consumption.  Please notify the INR pharmacist if you are started on any new medication including over the counter or herbal supplements. Also, please notify your INR pharmacist if any of your other prescription or over the counter medications have been discontinued.     Call [facility] at [phone] if you have any signs of abnormal bleeding/blood clot.  ------------------------------------------------------------------------------------------------------------------  Taking Warfarin Safely: Care Instructions    Your Care Instructions  Warfarin is a medicine that you take to prevent blood clots. It is often called a blood thinner. Doctors give warfarin (such as Coumadin) to reduce the risk of blood clots. You may be at risk for blood clots if you have atrial fibrillation or deep vein thrombosis. Some other health problems may also put you at risk.  Warfarin slows the amount of time it takes for your blood to clot. It can cause bleeding problems. Even if you've been taking warfarin for a while, it's important to know how to take it safely.  Foods and other medicines can affect the way warfarin works. Some can make warfarin work too well. This can cause bleeding problems. And some can make it work poorly, so that it does not prevent blood clots very well.  You will need regular blood tests to check how long it takes for your blood to form a clot. This test is called a PT or

## 2025-03-17 DIAGNOSIS — Z95.818 PRESENCE OF WATCHMAN LEFT ATRIAL APPENDAGE CLOSURE DEVICE: ICD-10-CM

## 2025-03-17 DIAGNOSIS — Z98.890 S/P ABLATION OF ATRIAL FIBRILLATION: ICD-10-CM

## 2025-03-17 DIAGNOSIS — Z86.79 S/P ABLATION OF ATRIAL FIBRILLATION: ICD-10-CM

## 2025-03-17 DIAGNOSIS — I10 PRIMARY HYPERTENSION: ICD-10-CM

## 2025-03-17 DIAGNOSIS — Z79.01 ANTICOAGULATED: ICD-10-CM

## 2025-03-17 DIAGNOSIS — I48.19 PERSISTENT ATRIAL FIBRILLATION (HCC): ICD-10-CM

## 2025-03-17 LAB
ANION GAP SERPL CALC-SCNC: 5 MMOL/L (ref 2–12)
BUN SERPL-MCNC: 11 MG/DL (ref 6–20)
BUN/CREAT SERPL: 17 (ref 12–20)
CALCIUM SERPL-MCNC: 8.8 MG/DL (ref 8.5–10.1)
CHLORIDE SERPL-SCNC: 106 MMOL/L (ref 97–108)
CO2 SERPL-SCNC: 25 MMOL/L (ref 21–32)
CREAT SERPL-MCNC: 0.64 MG/DL (ref 0.55–1.02)
ERYTHROCYTE [DISTWIDTH] IN BLOOD BY AUTOMATED COUNT: 14.6 % (ref 11.5–14.5)
GLUCOSE SERPL-MCNC: 94 MG/DL (ref 65–100)
HCT VFR BLD AUTO: 22.4 % (ref 35–47)
HGB BLD-MCNC: 7.1 G/DL (ref 11.5–16)
MCH RBC QN AUTO: 31.3 PG (ref 26–34)
MCHC RBC AUTO-ENTMCNC: 31.7 G/DL (ref 30–36.5)
MCV RBC AUTO: 98.7 FL (ref 80–99)
NRBC # BLD: 0 K/UL (ref 0–0.01)
NRBC BLD-RTO: 0 PER 100 WBC
PLATELET # BLD AUTO: 333 K/UL (ref 150–400)
PMV BLD AUTO: 9.8 FL (ref 8.9–12.9)
POTASSIUM SERPL-SCNC: 4.2 MMOL/L (ref 3.5–5.1)
RBC # BLD AUTO: 2.27 M/UL (ref 3.8–5.2)
SODIUM SERPL-SCNC: 136 MMOL/L (ref 136–145)
WBC # BLD AUTO: 5 K/UL (ref 3.6–11)

## 2025-03-18 ENCOUNTER — RESULTS FOLLOW-UP (OUTPATIENT)
Age: 75
End: 2025-03-18

## 2025-03-18 ENCOUNTER — HOSPITAL ENCOUNTER (INPATIENT)
Facility: HOSPITAL | Age: 75
LOS: 3 days | Discharge: HOME HEALTH | DRG: 378 | End: 2025-03-21
Attending: EMERGENCY MEDICINE | Admitting: FAMILY MEDICINE
Payer: MEDICARE

## 2025-03-18 DIAGNOSIS — K92.2 GASTROINTESTINAL HEMORRHAGE, UNSPECIFIED GASTROINTESTINAL HEMORRHAGE TYPE: Primary | ICD-10-CM

## 2025-03-18 PROBLEM — D62 ACUTE BLOOD LOSS ANEMIA: Status: ACTIVE | Noted: 2025-03-18

## 2025-03-18 LAB
ALBUMIN SERPL-MCNC: 3.6 G/DL (ref 3.5–5)
ALBUMIN/GLOB SERPL: 0.8 (ref 1.1–2.2)
ALP SERPL-CCNC: 136 U/L (ref 45–117)
ALT SERPL-CCNC: 18 U/L (ref 12–78)
ANION GAP SERPL CALC-SCNC: 6 MMOL/L (ref 2–12)
APPEARANCE UR: CLEAR
AST SERPL-CCNC: 21 U/L (ref 15–37)
BACTERIA URNS QL MICRO: ABNORMAL /HPF
BASOPHILS # BLD: 0.03 K/UL (ref 0–0.1)
BASOPHILS NFR BLD: 0.5 % (ref 0–1)
BILIRUB SERPL-MCNC: 0.2 MG/DL (ref 0.2–1)
BILIRUB UR QL: NEGATIVE
BUN SERPL-MCNC: 16 MG/DL (ref 6–20)
BUN/CREAT SERPL: 18 (ref 12–20)
CALCIUM SERPL-MCNC: 9.1 MG/DL (ref 8.5–10.1)
CHLORIDE SERPL-SCNC: 106 MMOL/L (ref 97–108)
CO2 SERPL-SCNC: 23 MMOL/L (ref 21–32)
COLOR UR: ABNORMAL
COMMENT:: NORMAL
CREAT SERPL-MCNC: 0.9 MG/DL (ref 0.55–1.02)
DIFFERENTIAL METHOD BLD: ABNORMAL
EKG ATRIAL RATE: 79 BPM
EKG DIAGNOSIS: NORMAL
EKG P-R INTERVAL: 130 MS
EKG Q-T INTERVAL: 398 MS
EKG QRS DURATION: 86 MS
EKG QTC CALCULATION (BAZETT): 456 MS
EKG R AXIS: 199 DEGREES
EKG T AXIS: 107 DEGREES
EKG VENTRICULAR RATE: 79 BPM
EOSINOPHIL # BLD: 0.34 K/UL (ref 0–0.4)
EOSINOPHIL NFR BLD: 6.2 % (ref 0–7)
EPITH CASTS URNS QL MICRO: ABNORMAL /LPF
ERYTHROCYTE [DISTWIDTH] IN BLOOD BY AUTOMATED COUNT: 14.7 % (ref 11.5–14.5)
FERRITIN SERPL-MCNC: 15 NG/ML (ref 26–388)
FOLATE SERPL-MCNC: 19.8 NG/ML (ref 5–21)
GLOBULIN SER CALC-MCNC: 4.3 G/DL (ref 2–4)
GLUCOSE SERPL-MCNC: 86 MG/DL (ref 65–100)
GLUCOSE UR STRIP.AUTO-MCNC: NEGATIVE MG/DL
HCT VFR BLD AUTO: 22.1 % (ref 35–47)
HCT VFR BLD AUTO: 24.7 % (ref 35–47)
HGB BLD-MCNC: 7 G/DL (ref 11.5–16)
HGB BLD-MCNC: 8 G/DL (ref 11.5–16)
HGB UR QL STRIP: NEGATIVE
HISTORY CHECK: NORMAL
IMM GRANULOCYTES # BLD AUTO: 0.01 K/UL (ref 0–0.04)
IMM GRANULOCYTES NFR BLD AUTO: 0.2 % (ref 0–0.5)
INR PPP: 1.2 (ref 0.9–1.1)
IRON SATN MFR SERPL: 4 % (ref 20–50)
IRON SERPL-MCNC: 19 UG/DL (ref 35–150)
KETONES UR QL STRIP.AUTO: NEGATIVE MG/DL
LEUKOCYTE ESTERASE UR QL STRIP.AUTO: ABNORMAL
LYMPHOCYTES # BLD: 0.85 K/UL (ref 0.8–3.5)
LYMPHOCYTES NFR BLD: 15.5 % (ref 12–49)
MAGNESIUM SERPL-MCNC: 2 MG/DL (ref 1.6–2.4)
MCH RBC QN AUTO: 30.7 PG (ref 26–34)
MCHC RBC AUTO-ENTMCNC: 31.7 G/DL (ref 30–36.5)
MCV RBC AUTO: 96.9 FL (ref 80–99)
MONOCYTES # BLD: 0.6 K/UL (ref 0–1)
MONOCYTES NFR BLD: 10.9 % (ref 5–13)
NEUTS SEG # BLD: 3.66 K/UL (ref 1.8–8)
NEUTS SEG NFR BLD: 66.7 % (ref 32–75)
NITRITE UR QL STRIP.AUTO: NEGATIVE
NRBC # BLD: 0 K/UL (ref 0–0.01)
NRBC BLD-RTO: 0 PER 100 WBC
PH UR STRIP: 5 (ref 5–8)
PLATELET # BLD AUTO: 397 K/UL (ref 150–400)
PMV BLD AUTO: 9.3 FL (ref 8.9–12.9)
POTASSIUM SERPL-SCNC: 4.1 MMOL/L (ref 3.5–5.1)
PROT SERPL-MCNC: 7.9 G/DL (ref 6.4–8.2)
PROT UR STRIP-MCNC: NEGATIVE MG/DL
PROTHROMBIN TIME: 13 SEC (ref 9.2–11.2)
RBC # BLD AUTO: 2.28 M/UL (ref 3.8–5.2)
RBC #/AREA URNS HPF: ABNORMAL /HPF (ref 0–5)
RETICS # AUTO: 0.04 M/UL (ref 0.02–0.08)
RETICS/RBC NFR AUTO: 2 % (ref 0.7–2.1)
SODIUM SERPL-SCNC: 135 MMOL/L (ref 136–145)
SP GR UR REFRACTOMETRY: 1.03 (ref 1–1.03)
SPECIMEN HOLD: NORMAL
SPECIMEN HOLD: NORMAL
TIBC SERPL-MCNC: 469 UG/DL (ref 250–450)
TROPONIN I SERPL HS-MCNC: 10 NG/L (ref 0–51)
UROBILINOGEN UR QL STRIP.AUTO: 0.2 EU/DL (ref 0.2–1)
VIT B12 SERPL-MCNC: 218 PG/ML (ref 193–986)
WBC # BLD AUTO: 5.5 K/UL (ref 3.6–11)
WBC URNS QL MICRO: ABNORMAL /HPF (ref 0–4)

## 2025-03-18 PROCEDURE — 80053 COMPREHEN METABOLIC PANEL: CPT

## 2025-03-18 PROCEDURE — 86850 RBC ANTIBODY SCREEN: CPT

## 2025-03-18 PROCEDURE — 83550 IRON BINDING TEST: CPT

## 2025-03-18 PROCEDURE — 84484 ASSAY OF TROPONIN QUANT: CPT

## 2025-03-18 PROCEDURE — 86900 BLOOD TYPING SEROLOGIC ABO: CPT

## 2025-03-18 PROCEDURE — 85025 COMPLETE CBC W/AUTO DIFF WBC: CPT

## 2025-03-18 PROCEDURE — 6360000002 HC RX W HCPCS: Performed by: FAMILY MEDICINE

## 2025-03-18 PROCEDURE — 99285 EMERGENCY DEPT VISIT HI MDM: CPT

## 2025-03-18 PROCEDURE — 85610 PROTHROMBIN TIME: CPT

## 2025-03-18 PROCEDURE — 82607 VITAMIN B-12: CPT

## 2025-03-18 PROCEDURE — 83735 ASSAY OF MAGNESIUM: CPT

## 2025-03-18 PROCEDURE — 2500000003 HC RX 250 WO HCPCS: Performed by: FAMILY MEDICINE

## 2025-03-18 PROCEDURE — 93010 ELECTROCARDIOGRAM REPORT: CPT | Performed by: INTERNAL MEDICINE

## 2025-03-18 PROCEDURE — 85018 HEMOGLOBIN: CPT

## 2025-03-18 PROCEDURE — 86901 BLOOD TYPING SEROLOGIC RH(D): CPT

## 2025-03-18 PROCEDURE — 30233N1 TRANSFUSION OF NONAUTOLOGOUS RED BLOOD CELLS INTO PERIPHERAL VEIN, PERCUTANEOUS APPROACH: ICD-10-PCS | Performed by: INTERNAL MEDICINE

## 2025-03-18 PROCEDURE — 86923 COMPATIBILITY TEST ELECTRIC: CPT

## 2025-03-18 PROCEDURE — 81001 URINALYSIS AUTO W/SCOPE: CPT

## 2025-03-18 PROCEDURE — 85045 AUTOMATED RETICULOCYTE COUNT: CPT

## 2025-03-18 PROCEDURE — 93005 ELECTROCARDIOGRAM TRACING: CPT | Performed by: EMERGENCY MEDICINE

## 2025-03-18 PROCEDURE — P9016 RBC LEUKOCYTES REDUCED: HCPCS

## 2025-03-18 PROCEDURE — 2060000000 HC ICU INTERMEDIATE R&B

## 2025-03-18 PROCEDURE — 85014 HEMATOCRIT: CPT

## 2025-03-18 PROCEDURE — 82746 ASSAY OF FOLIC ACID SERUM: CPT

## 2025-03-18 PROCEDURE — 82728 ASSAY OF FERRITIN: CPT

## 2025-03-18 PROCEDURE — 83540 ASSAY OF IRON: CPT

## 2025-03-18 RX ORDER — SODIUM CHLORIDE 9 MG/ML
INJECTION, SOLUTION INTRAVENOUS PRN
Status: DISCONTINUED | OUTPATIENT
Start: 2025-03-18 | End: 2025-03-21 | Stop reason: HOSPADM

## 2025-03-18 RX ORDER — SODIUM CHLORIDE 0.9 % (FLUSH) 0.9 %
5-40 SYRINGE (ML) INJECTION PRN
Status: DISCONTINUED | OUTPATIENT
Start: 2025-03-18 | End: 2025-03-21 | Stop reason: HOSPADM

## 2025-03-18 RX ORDER — POLYETHYLENE GLYCOL 3350 17 G/17G
17 POWDER, FOR SOLUTION ORAL DAILY PRN
Status: DISCONTINUED | OUTPATIENT
Start: 2025-03-18 | End: 2025-03-21 | Stop reason: HOSPADM

## 2025-03-18 RX ORDER — SODIUM CHLORIDE 0.9 % (FLUSH) 0.9 %
5-40 SYRINGE (ML) INJECTION EVERY 12 HOURS SCHEDULED
Status: DISCONTINUED | OUTPATIENT
Start: 2025-03-18 | End: 2025-03-21 | Stop reason: HOSPADM

## 2025-03-18 RX ORDER — ONDANSETRON 2 MG/ML
4 INJECTION INTRAMUSCULAR; INTRAVENOUS EVERY 6 HOURS PRN
Status: DISCONTINUED | OUTPATIENT
Start: 2025-03-18 | End: 2025-03-21 | Stop reason: HOSPADM

## 2025-03-18 RX ORDER — OCTREOTIDE ACETATE 50 UG/ML
50 INJECTION, SOLUTION INTRAVENOUS; SUBCUTANEOUS ONCE
Status: COMPLETED | OUTPATIENT
Start: 2025-03-18 | End: 2025-03-18

## 2025-03-18 RX ORDER — ACETAMINOPHEN 325 MG/1
650 TABLET ORAL EVERY 6 HOURS PRN
Status: DISCONTINUED | OUTPATIENT
Start: 2025-03-18 | End: 2025-03-21 | Stop reason: HOSPADM

## 2025-03-18 RX ORDER — ACETAMINOPHEN 650 MG/1
650 SUPPOSITORY RECTAL EVERY 6 HOURS PRN
Status: DISCONTINUED | OUTPATIENT
Start: 2025-03-18 | End: 2025-03-21 | Stop reason: HOSPADM

## 2025-03-18 RX ORDER — ONDANSETRON 4 MG/1
4 TABLET, ORALLY DISINTEGRATING ORAL EVERY 8 HOURS PRN
Status: DISCONTINUED | OUTPATIENT
Start: 2025-03-18 | End: 2025-03-21 | Stop reason: HOSPADM

## 2025-03-18 RX ADMIN — OCTREOTIDE ACETATE 50 MCG: 50 INJECTION, SOLUTION INTRAVENOUS; SUBCUTANEOUS at 22:10

## 2025-03-18 RX ADMIN — SODIUM CHLORIDE, PRESERVATIVE FREE 10 ML: 5 INJECTION INTRAVENOUS at 22:00

## 2025-03-18 ASSESSMENT — PAIN - FUNCTIONAL ASSESSMENT
PAIN_FUNCTIONAL_ASSESSMENT: 0-10
PAIN_FUNCTIONAL_ASSESSMENT: NONE - DENIES PAIN

## 2025-03-18 ASSESSMENT — PAIN SCALES - GENERAL: PAINLEVEL_OUTOF10: 0

## 2025-03-18 NOTE — TELEPHONE ENCOUNTER
Verified patient with two identifiers. This RN informed pt on rodríguez NP recommendations to be seen in the ER due to hemoglobin of 7.1. This RN informed pt to go to [facility] ER due to one of our team members being at [facility] to be able to see her. Pt states she has some dizziness over the last couple of days and confirmed going to [facility] ER

## 2025-03-18 NOTE — PROGRESS NOTES
4 Eyes Skin Assessment     NAME:  Darshana Kraus  YOB: 1950  MEDICAL RECORD NUMBER:  [id number]    The patient is being assessed for  Admission    I agree that at least one RN has performed a thorough Head to Toe Skin Assessment on the patient. ALL assessment sites listed below have been assessed.      Areas assessed by both nurses:    Head, Face, Ears, Shoulders, Back, Chest, Arms, Elbows, Hands, Sacrum. Buttock, Coccyx, Ischium, and Legs. Feet and Heels        Does the Patient have a Wound? No noted wound(s)       Jose Prevention initiated by RN: Yes  Wound Care Orders initiated by RN: Yes    Pressure Injury (Stage 3,4, Unstageable, DTI, NWPT, and Complex wounds) if present, place Wound referral order by RN under : No    New Ostomies, if present place, Ostomy referral order under : No     Nurse 1 eSignature: Electronically signed by Alana Valero RN on 3/18/25 at 6:46 PM EDT    **SHARE this note so that the co-signing nurse can place an eSignature**    Nurse 2 eSignature: Electronically signed by THOMAS YU RN on 3/18/25 at 6:51 PM EDT

## 2025-03-18 NOTE — ED PROVIDER NOTES
[facility] MED SURG  EMERGENCY DEPARTMENT ENCOUNTER      Patient Name: Darshana Kraus  MRN: [id number]  Birthdate 1950  Date of Evaluation: 3/18/2025  Physician: Azael Pickering MD    CHIEF COMPLAINT       Chief Complaint   Patient presents with    Dizziness    Rectal Bleeding       HISTORY OF PRESENT ILLNESS   (Location/Symptom, Timing/Onset, Context/Setting, Quality, Duration, Modifying Factors, Severity)   Darshana Kraus, 74 y.o., female     75-year-old female with a history of atrial fibrillation on Coumadin presents with a chief complaint of lightheadedness.  She has noticed black stools for the last month or 2.  She had outpatient labs done yesterday which showed a hemoglobin of 7.1          Nursing Notes were reviewed.    REVIEW OF SYSTEMS    (Not required)   Review of Systems    Except as noted above the remainder of the review of systems was reviewed and negative.     PAST MEDICAL HISTORY     Past Medical History:   Diagnosis Date    Asthma     Atrial fibrillation (HCC)     Bipolar 1 disorder (HCC)     Essential hypertension     Pneumonia        SURGICAL HISTORY       Past Surgical History:   Procedure Laterality Date    CARDIAC PROCEDURE N/A 1/31/2025    Sunil during cath case performed by Quoc Olguin MD at [facility] CARDIAC CATH LAB    CARDIAC PROCEDURE N/A 1/31/2025    Intracardiac echocardiogram performed by Quoc Olguin MD at [facility] CARDIAC CATH LAB    EP DEVICE PROCEDURE N/A 1/31/2025    Ablation A-fib w complete ep study performed by Quoc Olguin MD at [facility] CARDIAC CATH LAB    EP DEVICE PROCEDURE N/A 1/31/2025    Watchman chris closure device performed by Quoc Olguin MD at [facility] CARDIAC CATH LAB    EP DEVICE PROCEDURE N/A 1/31/2025    Drug stimulation performed by Quoc Olguin MD at [facility] CARDIAC CATH LAB    EP DEVICE PROCEDURE N/A 1/31/2025    Ep 3d mapping performed by Quoc Olguin MD at [facility] CARDIAC CATH LAB    INVASIVE VASCULAR N/A 1/31/2025    Ultrasound guided vascular access performed by Sharif

## 2025-03-18 NOTE — ED NOTES
ED TO INPATIENT SBAR HANDOFF    Patient Name: Darshana Kraus   :  1950  74 y.o.   MRN:  [id number]  ED Room #:  ER29/29     Situation  Code Status: Full Code   Allergies: Latex, Nexium [esomeprazole], Esomeprazole magnesium, Hydrochlorothiazide, and Lisinopril  Weight: Patient Vitals for the past 96 hrs (Last 3 readings):   Weight   25 1215 84.4 kg (186 lb 1.1 oz)       Arrived from: home    Chief Complaint:   Chief Complaint   Patient presents with    Dizziness    Rectal Bleeding       Hospital Problem/Diagnosis:  Principal Problem:    Acute blood loss anemia  Resolved Problems:    * No resolved hospital problems. *      Mobility: no current mobility problem   ED Fall Risk: Presents to emergency department  because of falls (Syncope, seizure, or loss of consciousness): No, Age > 70: Yes, Altered Mental Status, Intoxication with alcohol or substance confusion (Disorientation, impaired judgment, poor safety awaremess, or inability to follow instructions): No, Impaired Mobility: Ambulates or transfers with assistive devices or assistance; Unable to ambulate or transer.: Yes, Nursing Judgement: Yes   Fell in ED or prior to admission: no   Restraints: no     Sitter: no   Family/Caregiver Present: no    Neet to know social/safety information:     Background  History:   Past Medical History:   Diagnosis Date    Asthma     Atrial fibrillation (HCC)     Bipolar 1 disorder (HCC)     Essential hypertension     Pneumonia        Assessment    Abnormal Assessment Findings:   Imaging:   No orders to display     Abnormal labs:   Abnormal Labs Reviewed   CBC WITH AUTO DIFFERENTIAL - Abnormal; Notable for the following components:       Result Value    RBC 2.28 (*)     Hemoglobin 7.0 (*)     Hematocrit 22.1 (*)     RDW 14.7 (*)     All other components within normal limits   COMPREHENSIVE METABOLIC PANEL - Abnormal; Notable for the following components:    Sodium 135 (*)     Alk Phosphatase 136 (*)     Globulin 4.3 (*)

## 2025-03-18 NOTE — ED TRIAGE NOTES
Pt c/o dizziness x 2 days, had labs done and was told that she needed a pint or two of blood, +dark black stools x 1-2 months, denies cp or fever, +nausea , denies abd pain

## 2025-03-18 NOTE — CONSULTS
[facility]    [address] [address]        GASTROENTEROLOGY CONSULTATION NOTE  Genesis Peoples PA-C  [phone] office  NP/PA in-hospital M-F until 4:30PM  After 5PM or on weekends, please call  for physician on call        NAME:  Darshana Kraus   :   1950   MRN:   [id number]       Referring Physician: Kleber    Consult Date: 3/18/2025 4:02 PM    Chief Complaint: suspected GIB     History of Present Illness:  Patient is a 74 y.o. who is seen in consultation at the request of Dr. Shahid for suspected GIB. The patient has a past medical history signifciant for asthma, Afib, HTN. The patient initially presented for dizziness. Reports recent cardiac workup with a 'leaky heart' and plans for a watchman scheduled later this week per patient. On warfarin. Reports melena, unsure how long this has been occurring. No hematochezia. No abdominal pain. Endorses nausea, no vomiting. No dysphagia. No reflux. No NSAID use. Reports a last colonoscopy >10 years ago. Unsure when her last EGD was, but reports it was likely also >10 years ago.    I have reviewed the emergency room note, hospital admission note, notes by all other clinicians who have seen the patient during this hospitalization to date. I have reviewed the problem list and the reason for this hospitalization. I have reviewed the allergies and the medications the patient was taking at home prior to this hospitalization.    PMH:  Past Medical History:   Diagnosis Date    Asthma     Atrial fibrillation (HCC)     Bipolar 1 disorder (HCC)     Essential hypertension     Pneumonia        PSH:  Past Surgical History:   Procedure Laterality Date    CARDIAC PROCEDURE N/A 2025    Sunil during cath case performed by Quoc Olguin MD at [facility] CARDIAC CATH LAB    CARDIAC PROCEDURE N/A 2025    Intracardiac echocardiogram performed by Quoc Olguin MD at [facility] CARDIAC CATH LAB    EP DEVICE PROCEDURE N/A 2025    Ablation A-fib  to participate in care of Darshana Kraus     Signed By: ITZ Colin     3/18/2025  4:02 PM

## 2025-03-18 NOTE — H&P
melena  -transfuse for hgb <7  -clear liquid, type and screen, two large bore IV, trend H/H  -check ferritin, reticulocyte, iron+TIBC, B12+folate and hemoccult  -continue pepcid  -GI consult    #atrial fibrillation on warfarin  -NSR on EKG  -INR 1.2  -hold warfarin/eliquis in the setting of GIB    #HTN  -valsartan, metoprolol    #bipolar I disorder  -continue home effexor, trileptal, and abilify    #DM II  #neuropathy  -hold metformin  -continue home gabapentin  -hold lipitor  -ISS    #hypothyroidism  -continue levothyroxine    DIET: No diet orders on file   ISOLATION PRECAUTIONS: No active isolations  CODE STATUS: Full code    DVT PROPHYLAXIS: SCD's or Sequential Compression Device  ANTICIPATED DISCHARGE: 2-3 days  ANTICIPATED DISPOSITION: Home    CRITICAL CARE WAS PERFORMED FOR THIS ENCOUNTER: 30 to 74 minutes      Signed By: Marycarmen Shahid MD     March 18, 2025         Please note that this dictation may have been completed with Dragon, the computer voice recognition software.  Quite often unanticipated grammatical, syntax, homophones, and other interpretive errors are inadvertently transcribed by the computer software.  Please disregard these errors.  Please excuse any errors that have escaped final proofreading.

## 2025-03-18 NOTE — TELEPHONE ENCOUNTER
----- Message from DEANDRA Ayala NP sent at 3/18/2025  8:27 AM EDT -----  Hgb 7.1/Hct 22.4.  She has a history of anemia, but this is approx 3 points below her baseline, even compared to 1 month ago.  Otherwise without significant acute abnormalities.    Recommend ER eval, possible transfusion.  They can also check her INR to make sure she isn't supratherapeutic & treat accordingly.      Currently on warfarin & ASA post Watchman & AF ablation.  She'll still need anticoagulation in the short term post Watchman, but defer to Dr. Olguin on whether he would feel comfortable with her holding ASA for now.

## 2025-03-19 LAB
ABO + RH BLD: NORMAL
ANION GAP SERPL CALC-SCNC: 7 MMOL/L (ref 2–12)
BLD PROD TYP BPU: NORMAL
BLOOD BANK BLOOD PRODUCT EXPIRATION DATE: NORMAL
BLOOD BANK DISPENSE STATUS: NORMAL
BLOOD BANK ISBT PRODUCT BLOOD TYPE: 9500
BLOOD BANK PRODUCT CODE: NORMAL
BLOOD BANK UNIT TYPE AND RH: NORMAL
BLOOD GROUP ANTIBODIES SERPL: NORMAL
BPU ID: NORMAL
BUN SERPL-MCNC: 10 MG/DL (ref 6–20)
BUN/CREAT SERPL: 15 (ref 12–20)
CALCIUM SERPL-MCNC: 9.4 MG/DL (ref 8.5–10.1)
CHLORIDE SERPL-SCNC: 103 MMOL/L (ref 97–108)
CO2 SERPL-SCNC: 24 MMOL/L (ref 21–32)
CREAT SERPL-MCNC: 0.67 MG/DL (ref 0.55–1.02)
CROSSMATCH RESULT: NORMAL
GLUCOSE SERPL-MCNC: 129 MG/DL (ref 65–100)
HCT VFR BLD AUTO: 26 % (ref 35–47)
HCT VFR BLD AUTO: 27.9 % (ref 35–47)
HGB BLD-MCNC: 8.5 G/DL (ref 11.5–16)
HGB BLD-MCNC: 9.2 G/DL (ref 11.5–16)
POTASSIUM SERPL-SCNC: 4.3 MMOL/L (ref 3.5–5.1)
SODIUM SERPL-SCNC: 134 MMOL/L (ref 136–145)
SPECIMEN EXP DATE BLD: NORMAL
UNIT DIVISION: 0
UNIT ISSUE DATE/TIME: NORMAL

## 2025-03-19 PROCEDURE — 6370000000 HC RX 637 (ALT 250 FOR IP)

## 2025-03-19 PROCEDURE — 2060000000 HC ICU INTERMEDIATE R&B

## 2025-03-19 PROCEDURE — 85018 HEMOGLOBIN: CPT

## 2025-03-19 PROCEDURE — 36415 COLL VENOUS BLD VENIPUNCTURE: CPT

## 2025-03-19 PROCEDURE — 99222 1ST HOSP IP/OBS MODERATE 55: CPT | Performed by: INTERNAL MEDICINE

## 2025-03-19 PROCEDURE — 2580000003 HC RX 258: Performed by: FAMILY MEDICINE

## 2025-03-19 PROCEDURE — 85014 HEMATOCRIT: CPT

## 2025-03-19 PROCEDURE — 6370000000 HC RX 637 (ALT 250 FOR IP): Performed by: FAMILY MEDICINE

## 2025-03-19 PROCEDURE — 80048 BASIC METABOLIC PNL TOTAL CA: CPT

## 2025-03-19 PROCEDURE — 6360000002 HC RX W HCPCS: Performed by: FAMILY MEDICINE

## 2025-03-19 RX ORDER — GABAPENTIN 300 MG/1
300 CAPSULE ORAL 3 TIMES DAILY PRN
Status: DISCONTINUED | OUTPATIENT
Start: 2025-03-19 | End: 2025-03-21 | Stop reason: HOSPADM

## 2025-03-19 RX ADMIN — OCTREOTIDE ACETATE 50 MCG/HR: 500 INJECTION, SOLUTION INTRAVENOUS; SUBCUTANEOUS at 10:34

## 2025-03-19 RX ADMIN — POLYETHYLENE GLYCOL-3350 AND ELECTROLYTES 4000 ML: 236; 6.74; 5.86; 2.97; 22.74 POWDER, FOR SOLUTION ORAL at 18:10

## 2025-03-19 RX ADMIN — OCTREOTIDE ACETATE 50 MCG/HR: 500 INJECTION, SOLUTION INTRAVENOUS; SUBCUTANEOUS at 00:16

## 2025-03-19 RX ADMIN — ACETAMINOPHEN 650 MG: 325 TABLET ORAL at 07:25

## 2025-03-19 ASSESSMENT — PAIN DESCRIPTION - DESCRIPTORS: DESCRIPTORS: NAGGING

## 2025-03-19 ASSESSMENT — PAIN SCALES - GENERAL: PAINLEVEL_OUTOF10: 5

## 2025-03-19 ASSESSMENT — PAIN DESCRIPTION - LOCATION: LOCATION: HEAD

## 2025-03-19 NOTE — PROGRESS NOTES
Ok to proceed with GI procedure (EGD/colonoscopy) from cardiology perspective. Full consult note to follow.

## 2025-03-19 NOTE — PLAN OF CARE
Problem: Discharge Planning  Goal: Discharge to home or other facility with appropriate resources  Outcome: Progressing  Flowsheets (Taken 3/18/2025 4654)  Discharge to home or other facility with appropriate resources: Identify barriers to discharge with patient and caregiver     Problem: Safety - Adult  Goal: Free from fall injury  Outcome: Progressing

## 2025-03-19 NOTE — CONSENT
Informed Consent for Blood Component Transfusion Note    I have discussed with the patient the rationale for blood component transfusion; its benefits in treating or preventing fatigue, organ damage, or death; and its risk which includes mild transfusion reactions, rare risk of blood borne infection, or more serious but rare reactions. I have discussed the alternatives to transfusion, including the risk and consequences of not receiving transfusion. The patient had an opportunity to ask questions and had agreed to proceed with transfusion of blood components.    Electronically signed by Marycarmen Shahid MD on 3/18/25 at 9:32 PM EDT

## 2025-03-19 NOTE — PROGRESS NOTES
NUTRITION     Nutrition screening referral was triggered based on results obtained during nursing admission assessment for   Flowsheet Row Most Recent Value   Malnutrition Screen    Have you recently lost weight without trying? Unsure of amount of weight loss (2 points)   Have you been eating poorly because of a decreased appetite? No (0 points)     The patient's chart was reviewed and nutrition assessment is not indicated at this time. No nutritionally significant weight loss within past year. Plan to see patient for rescreen as indicated.Thank you.   Wt Readings from Last 13 Encounters:   03/18/25 84.4 kg (186 lb 1.1 oz)   02/25/25 85.2 kg (187 lb 12.8 oz)   02/01/25 85.3 kg (188 lb)   11/26/24 84.8 kg (187 lb)   10/28/24 85.8 kg (189 lb 3.2 oz)   09/19/24 85.8 kg (189 lb 3.2 oz)   09/04/24 83.9 kg (185 lb)   08/22/24 89.8 kg (198 lb)   08/01/24 91.2 kg (201 lb)   04/26/24 92.1 kg (203 lb)   04/16/24 92.4 kg (203 lb 11.3 oz)   03/15/24 94.6 kg (208 lb 8.9 oz)   02/01/24 94.8 kg (209 lb)     Carolyne Francisco RD

## 2025-03-19 NOTE — PROGRESS NOTES
Result Value Ref Range    Sodium 134 (L) 136 - 145 mmol/L    Potassium 4.3 3.5 - 5.1 mmol/L    Chloride 103 97 - 108 mmol/L    CO2 24 21 - 32 mmol/L    Anion Gap 7 2 - 12 mmol/L    Glucose 129 (H) 65 - 100 mg/dL    BUN 10 6 - 20 MG/DL    Creatinine 0.67 0.55 - 1.02 MG/DL    BUN/Creatinine Ratio 15 12 - 20      Est, Glom Filt Rate >90 >60 ml/min/1.73m2    Calcium 9.4 8.5 - 10.1 MG/DL   Hemoglobin and Hematocrit    Collection Time: 03/19/25  5:11 AM   Result Value Ref Range    Hemoglobin 8.5 (L) 11.5 - 16.0 g/dL    Hematocrit 26.0 (L) 35.0 - 47.0 %            Assessment:     GI bleed: Hgb 8.5 , was 7.0 on admission. PT reports intermittent melena. No abdominal imaging to review. Differential includes gastritis vs GERD vs PUD, less likely lower GI source. Recommend EGD to further investigate: PT will need cardiac clearance given cardiac history and warfarin use. Monitor hgb, transfuse as necessary  Afib: on warfarin, pt reports plans for watchman later this week as an outpatient. Will request cardiac evaluation including cardiac/warfarin clearance prior to proceeding with EGD   HTN     Patient Active Problem List   Diagnosis    Hypoxia    PAF (paroxysmal atrial fibrillation) (HCC)    Primary hypertension    Dilated cardiomyopathy (HCC)    Bipolar 1 disorder (HCC)    A-fib (HCC)    Paroxysmal atrial fibrillation (HCC)    Sick sinus syndrome (HCC)    Presence of Watchman left atrial appendage closure device    Acute blood loss anemia     Plan:     CLD as tolerated  NPO at midnight  Bowel prep tonight   BID PPI  Trend CBC  Transfuse as necessary  Plan for EGD/colonoscopy 3/20/25: cardiology clearance obtained, INR 1.2.  If pt begins to bleed briskly, order CTA GI bleed protocol and immediately consult IR for intervention if the CTA is positive  Dicussed patient with Dr Richey      Signed By: ITZ Colin     3/19/2025  10:41 AM

## 2025-03-19 NOTE — WOUND CARE
Wound Care Note:     New consult for \"BL feet blisters\"   Seen in 209/02     74 y.o. y/o female admitted on 3/18/2025   Admitted for    Acute blood loss anemia [D62]  Gastrointestinal hemorrhage, unspecified gastrointestinal hemorrhage type [K92.2]   History of   Past Medical History:   Diagnosis Date    Asthma     Atrial fibrillation (HCC)     Bipolar 1 disorder (HCC)     Essential hypertension     Pneumonia        Lab Results   Component Value Date/Time    WBC 5.5 03/18/2025 10:54 AM    LABA1C 6.5 (H) 07/17/2020 03:55 AM    POCGLU 88 02/01/2025 11:19 AM    POCGLU 89 02/01/2025 07:59 AM    HGB 8.5 (L) 03/19/2025 05:11 AM    HCT 26.0 (L) 03/19/2025 05:11 AM     03/18/2025 10:54 AM       Diet: Diet NPO  Diet NPO           Assessment:   Appropriately conversational and Communicative and reports no pain.      Left foot plantar corn., right 4th toe corn, right first met callous  Cut Platinum out of middle of foam dressing and padded around corn/callous    Patient to return to outpatient podiatry when discharged    Recommend:      and Jose Protocol:  Turn/reposition approximately every 2 hours  Offload heels with heels hanging off end of pillow at all times while in bed.  Sacral Preventive dressing: change as needed ~every 4 days. Discontinue if incontinence is frequently soiling dressing.     Address incontinence/Maintain continence    Transition of Care: Will sign off.    Georgiana DAVIS, RN  Wound, Ostomy, Continence Nurse  office [phone]  Available via [email or web address]

## 2025-03-19 NOTE — PROGRESS NOTES
[facility]                                                                                          Hospitalist Progress Note  Mihir Chavez MD  Office Phone: [phone]        Date of Service:  3/19/2025  NAME:  Darshana Kraus  :  1950  MRN:  [id number]       Admission Summary:   Darshana Kraus is a 74 y.o. female witah pmhx atrial fibrillation on warfarin , bipolar 1 disorder, and HTN who presents with dizziness and melena, and is being admitted for acute blood loss anemia 2/2 suspected GIB.  She has noted dark stools for 1-2 months.  Yesterday OP labs revealed hgb 7.1, and she was advised to present to the ED for further management.  She endorses several months of melanotic stools.  She was last evaluated by gastroenterology many years ago with colonoscopy.  She does endorse significant EtOH of 24 pack of 12 ounce beer cans daily, but quit several months ago.     In the ED VSS.  Labs showed Hgb 7, and INR 1.2.  EKG normal sinus rhythm.          Interval history / Subjective:   Follow up GI bleed       Assessment & Plan:     #acute blood loss anemia  #suspected GIB  -Hgb 7, melena  -transfuse for hgb <7  -s/p 1 unit PRBC 3/18  -clear liquid, type and screen, two large bore IV, trend H/H  -check ferritin, reticulocyte, iron+TIBC, B12+folate and hemoccult  -continue pepcid  -Appreciate GI, EGD/colonoscopy 3/20     #atrial fibrillation on warfarin s/p ablation and Watchman 2025  -NSR on EKG  -INR 1.2  -hold warfarin in the setting of GIB  -Appreciate Cardiology  -EP to see the patient      #HTN: valsartan, metoprolol  #bipolar I disorder: continue home effexor, trileptal, and abilify     #DM II with neuropathy: hold metformin. continue home gabapentin. SSI  Hyperlipidemia: hold lipitor  #hypothyroidism: continue levothyroxine     Clear liquids  NPO from midnight     Code status: FULL CODE  Prophylaxis: scd    Plan: EGD/colonoscopy 3/20  Care Plan discussed with:  patient, RN, CM  Anticipated Disposition: 3/20 or 3/21  Inpatient  Cardiac monitoring: Telemetry  Central Line:            Social Drivers of Health     Tobacco Use: Low Risk  (1/31/2025)    Patient History     Smoking Tobacco Use: Never     Smokeless Tobacco Use: Never     Passive Exposure: Not on file   Alcohol Use: Not At Risk (3/15/2024)    AUDIT-C     Frequency of Alcohol Consumption: Never     Average Number of Drinks: Patient does not drink     Frequency of Binge Drinking: Never   Financial Resource Strain: Not on file   Food Insecurity: No Food Insecurity (3/18/2025)    Hunger Vital Sign     Worried About Running Out of Food in the Last Year: Never true     Ran Out of Food in the Last Year: Never true   Transportation Needs: Patient Declined (3/18/2025)    PRAPARE - Transportation     Lack of Transportation (Medical): Patient declined     Lack of Transportation (Non-Medical): Patient declined   Physical Activity: Not on file   Stress: Not on file   Social Connections: Not on file   Intimate Partner Violence: Not At Risk (8/17/2024)    [facility]    Humiliation, Afraid, Rape, and Kick questionnaire     Fear of Current or Ex-Partner: No     Emotionally Abused: No     Physically Abused: No     Sexually Abused: No   Depression: Not at risk (11/26/2024)    PHQ-2     PHQ-2 Score: 0   Housing Stability: Patient Declined (3/18/2025)    Housing Stability Vital Sign     Unable to Pay for Housing in the Last Year: Patient declined     Number of Times Moved in the Last Year: 0     Homeless in the Last Year: Patient declined   Interpersonal Safety: Not At Risk (3/18/2025)    Interpersonal Safety Domain Source: IP Abuse Screening     Physical abuse: Denies     Verbal abuse: Denies     Emotional abuse: Denies     Financial abuse: Denies     Sexual abuse: Denies   Utilities: Patient Declined (3/18/2025)    [facility] Utilities     Threatened with loss of utilities: Patient declined       Review of

## 2025-03-19 NOTE — CONSULTS
[facility]  Cardiology Care Note                  []Initial visit     [x]Established visit     Patient Name: Darshana Kraus - :1950 - MRN:[id number]  Primary Cardiologist: Fernie Florence MD (formerly Dr. Barrett)  Consulting Cardiologist: Fernie Florence MD     Reason for initial visit:cardiology evaluation for pre-EGD/colonoscopy        Assessment/Plan/Discussion: Cardiology Attending:     3/19/2025 2:50 PM     This patient was seen and examined by me in a face-to-face visit today. I reviewed the medical record including lab results, imaging and other provider notes. I confirmed the history as described below . I spoke to the patient, obtained history and examined the patient. I discussed assessments and plans in detail with AURORA.     ASSESSMENT        Preoperative cardiac risk assessment for endoscopy  History of atrial fibrillation status post ablation status post Watchman  History of sick sinus node syndrome status post pacemaker  Hypertension  History of anemia with melena suspected GI bleed        PLAN      Patient presented to the hospital with acute anemia with melena--- while on Coumadin for atrial fibrillation status post recent Watchman  Currently cardiovascular stable except occasional dizziness which is secondary to acute on chronic anemia patient can proceed with upper GI endoscopy with low risk for perioperative cardiac events  Patient is also scheduled to undergo transesophageal echocardiography to assess Watchman device position and peridevice leak in the next few days.  Will ask Dr. Olguin if we could do it while she is in the hospital  And also further help in holding the Coumadin if no peridevice leak  Regarding acute anemia patient has got blood transfusion and is on octreotide as per GI  Patient will be followed by EP tomorrow      Rest as AURORA       Brief History: Darshana Kraus is a 74 y.o. female with past  Nexium [Esomeprazole] Anaphylaxis    Esomeprazole Magnesium Other (See Comments)    Hydrochlorothiazide Other (See Comments)     Severe hyponatremia causing seizures    Lisinopril Nausea And Vomiting          OBJECTIVE:  Wt Readings from Last 3 Encounters:   03/18/25 84.4 kg (186 lb 1.1 oz)   02/25/25 85.2 kg (187 lb 12.8 oz)   02/01/25 85.3 kg (188 lb)     Net IO Since Admission: 10 mL [03/19/25 0921]     Physical Exam:    Vitals:   Vitals:    03/19/25 0200 03/19/25 0400 03/19/25 0715 03/19/25 0906   BP:   (!) 167/78 (!) 157/80   Pulse: 67 65 73 76   Resp:   18 18   Temp:    97.5 °F (36.4 °C)   TempSrc:    Oral   SpO2:    97%   Weight:       Height:         Telemetry: NSR    Gen: Well-developed, well-nourished, in no acute distress  Neck: Supple, No JVD, No Carotid Bruit  Resp: No accessory muscle use, Clear breath sounds, No rales or rhonchi  Card: Regular Rate,Rhythm, Normal S1, S2, No murmurs, rubs or gallop. No thrills.   Abd:   Soft, non-tender, non-distended, BS+   MSK: No cyanosis  Skin: No rashes    Neuro: Moving all four extremities, follows commands appropriately,  Oriented to person, place, alert  Psych: , Nml Affect  LE: No edema          Data Review:     Radiology:   XR Results (most recent):  CT Result (most recent):  CTA CHEST W WO CONTRAST 01/28/2025    Narrative  EXAM:  CTA CHEST W WO CONTRAST    INDICATION: Atrial fibrillation, pulmonary vein mapping.    COMPARISON: None.    CONTRAST: 100 cc Isovue-370    TECHNIQUE: Multislice helical CT of the chest was performed on a 64-slice  multiple detector row CT system ([employer]). Unenhanced  images were obtained  to localize the volume for acquisition.  Bolus tracking software was used for  timing. Retrospectively EKG gated thin section images of the heart were acquired  during uneventful rapid bolus intravenous administration of contrast followed by  50 mL of saline. Coronal reformations were generated and 3-D shaded surface  display rendering of the

## 2025-03-20 ENCOUNTER — ANESTHESIA (OUTPATIENT)
Facility: HOSPITAL | Age: 75
End: 2025-03-20
Payer: MEDICARE

## 2025-03-20 ENCOUNTER — ANESTHESIA EVENT (OUTPATIENT)
Facility: HOSPITAL | Age: 75
End: 2025-03-20
Payer: MEDICARE

## 2025-03-20 LAB
ANION GAP SERPL CALC-SCNC: 9 MMOL/L (ref 2–12)
BUN SERPL-MCNC: 8 MG/DL (ref 6–20)
BUN/CREAT SERPL: 13 (ref 12–20)
CALCIUM SERPL-MCNC: 9.6 MG/DL (ref 8.5–10.1)
CHLORIDE SERPL-SCNC: 100 MMOL/L (ref 97–108)
CO2 SERPL-SCNC: 27 MMOL/L (ref 21–32)
CREAT SERPL-MCNC: 0.63 MG/DL (ref 0.55–1.02)
GLUCOSE SERPL-MCNC: 108 MG/DL (ref 65–100)
HCT VFR BLD AUTO: 28 % (ref 35–47)
HGB BLD-MCNC: 9.1 G/DL (ref 11.5–16)
POTASSIUM SERPL-SCNC: 4 MMOL/L (ref 3.5–5.1)
SODIUM SERPL-SCNC: 136 MMOL/L (ref 136–145)

## 2025-03-20 PROCEDURE — 0W3P8ZZ CONTROL BLEEDING IN GASTROINTESTINAL TRACT, VIA NATURAL OR ARTIFICIAL OPENING ENDOSCOPIC: ICD-10-PCS | Performed by: INTERNAL MEDICINE

## 2025-03-20 PROCEDURE — 2580000003 HC RX 258: Performed by: INTERNAL MEDICINE

## 2025-03-20 PROCEDURE — 2500000003 HC RX 250 WO HCPCS: Performed by: FAMILY MEDICINE

## 2025-03-20 PROCEDURE — 7100000010 HC PHASE II RECOVERY - FIRST 15 MIN: Performed by: INTERNAL MEDICINE

## 2025-03-20 PROCEDURE — 80048 BASIC METABOLIC PNL TOTAL CA: CPT

## 2025-03-20 PROCEDURE — 2709999900 HC NON-CHARGEABLE SUPPLY: Performed by: INTERNAL MEDICINE

## 2025-03-20 PROCEDURE — 0DBN8ZZ EXCISION OF SIGMOID COLON, VIA NATURAL OR ARTIFICIAL OPENING ENDOSCOPIC: ICD-10-PCS | Performed by: INTERNAL MEDICINE

## 2025-03-20 PROCEDURE — 6370000000 HC RX 637 (ALT 250 FOR IP): Performed by: INTERNAL MEDICINE

## 2025-03-20 PROCEDURE — 88305 TISSUE EXAM BY PATHOLOGIST: CPT

## 2025-03-20 PROCEDURE — 3600007512: Performed by: INTERNAL MEDICINE

## 2025-03-20 PROCEDURE — 97535 SELF CARE MNGMENT TRAINING: CPT

## 2025-03-20 PROCEDURE — 3700000000 HC ANESTHESIA ATTENDED CARE: Performed by: INTERNAL MEDICINE

## 2025-03-20 PROCEDURE — 85018 HEMOGLOBIN: CPT

## 2025-03-20 PROCEDURE — 97165 OT EVAL LOW COMPLEX 30 MIN: CPT

## 2025-03-20 PROCEDURE — 2580000003 HC RX 258: Performed by: FAMILY MEDICINE

## 2025-03-20 PROCEDURE — 6360000002 HC RX W HCPCS: Performed by: FAMILY MEDICINE

## 2025-03-20 PROCEDURE — 6360000002 HC RX W HCPCS

## 2025-03-20 PROCEDURE — 7100000011 HC PHASE II RECOVERY - ADDTL 15 MIN: Performed by: INTERNAL MEDICINE

## 2025-03-20 PROCEDURE — 2060000000 HC ICU INTERMEDIATE R&B

## 2025-03-20 PROCEDURE — 3700000001 HC ADD 15 MINUTES (ANESTHESIA): Performed by: INTERNAL MEDICINE

## 2025-03-20 PROCEDURE — 6370000000 HC RX 637 (ALT 250 FOR IP): Performed by: FAMILY MEDICINE

## 2025-03-20 PROCEDURE — 3600007502: Performed by: INTERNAL MEDICINE

## 2025-03-20 PROCEDURE — 2720000010 HC SURG SUPPLY STERILE: Performed by: INTERNAL MEDICINE

## 2025-03-20 PROCEDURE — 0DJ08ZZ INSPECTION OF UPPER INTESTINAL TRACT, VIA NATURAL OR ARTIFICIAL OPENING ENDOSCOPIC: ICD-10-PCS | Performed by: INTERNAL MEDICINE

## 2025-03-20 PROCEDURE — 6370000000 HC RX 637 (ALT 250 FOR IP): Performed by: HOSPITALIST

## 2025-03-20 PROCEDURE — 85014 HEMATOCRIT: CPT

## 2025-03-20 RX ORDER — SODIUM CHLORIDE 0.9 % (FLUSH) 0.9 %
5-40 SYRINGE (ML) INJECTION PRN
Status: DISCONTINUED | OUTPATIENT
Start: 2025-03-20 | End: 2025-03-20 | Stop reason: HOSPADM

## 2025-03-20 RX ORDER — ATORVASTATIN CALCIUM 20 MG/1
20 TABLET, FILM COATED ORAL DAILY
Status: DISCONTINUED | OUTPATIENT
Start: 2025-03-20 | End: 2025-03-21 | Stop reason: HOSPADM

## 2025-03-20 RX ORDER — FERROUS SULFATE 325(65) MG
325 TABLET ORAL 2 TIMES DAILY WITH MEALS
Status: DISCONTINUED | OUTPATIENT
Start: 2025-03-20 | End: 2025-03-21 | Stop reason: HOSPADM

## 2025-03-20 RX ORDER — LIDOCAINE HYDROCHLORIDE 20 MG/ML
INJECTION, SOLUTION EPIDURAL; INFILTRATION; INTRACAUDAL; PERINEURAL
Status: DISCONTINUED | OUTPATIENT
Start: 2025-03-20 | End: 2025-03-20 | Stop reason: SDUPTHER

## 2025-03-20 RX ORDER — AMLODIPINE BESYLATE 5 MG/1
2.5 TABLET ORAL DAILY
Status: DISCONTINUED | OUTPATIENT
Start: 2025-03-20 | End: 2025-03-21

## 2025-03-20 RX ORDER — SODIUM CHLORIDE 9 MG/ML
INJECTION, SOLUTION INTRAVENOUS CONTINUOUS
Status: DISCONTINUED | OUTPATIENT
Start: 2025-03-20 | End: 2025-03-20 | Stop reason: HOSPADM

## 2025-03-20 RX ORDER — SODIUM CHLORIDE 9 MG/ML
INJECTION, SOLUTION INTRAVENOUS PRN
Status: DISCONTINUED | OUTPATIENT
Start: 2025-03-20 | End: 2025-03-20 | Stop reason: HOSPADM

## 2025-03-20 RX ORDER — OXYBUTYNIN CHLORIDE 5 MG/1
5 TABLET, EXTENDED RELEASE ORAL DAILY
Status: DISCONTINUED | OUTPATIENT
Start: 2025-03-20 | End: 2025-03-21 | Stop reason: HOSPADM

## 2025-03-20 RX ORDER — LEVOTHYROXINE SODIUM 75 UG/1
75 TABLET ORAL
Status: DISCONTINUED | OUTPATIENT
Start: 2025-03-20 | End: 2025-03-21 | Stop reason: HOSPADM

## 2025-03-20 RX ORDER — OXCARBAZEPINE 150 MG/1
300 TABLET, FILM COATED ORAL 2 TIMES DAILY
Status: DISCONTINUED | OUTPATIENT
Start: 2025-03-20 | End: 2025-03-21 | Stop reason: HOSPADM

## 2025-03-20 RX ORDER — HYDROCHLOROTHIAZIDE 25 MG/1
12.5 TABLET ORAL DAILY
Status: DISCONTINUED | OUTPATIENT
Start: 2025-03-20 | End: 2025-03-21 | Stop reason: HOSPADM

## 2025-03-20 RX ORDER — SODIUM CHLORIDE 0.9 % (FLUSH) 0.9 %
5-40 SYRINGE (ML) INJECTION EVERY 12 HOURS SCHEDULED
Status: DISCONTINUED | OUTPATIENT
Start: 2025-03-20 | End: 2025-03-20 | Stop reason: HOSPADM

## 2025-03-20 RX ORDER — VALSARTAN 160 MG/1
80 TABLET ORAL 2 TIMES DAILY
Status: DISCONTINUED | OUTPATIENT
Start: 2025-03-20 | End: 2025-03-21 | Stop reason: HOSPADM

## 2025-03-20 RX ORDER — ASPIRIN 81 MG/1
81 TABLET, CHEWABLE ORAL DAILY
Status: DISCONTINUED | OUTPATIENT
Start: 2025-03-20 | End: 2025-03-21 | Stop reason: HOSPADM

## 2025-03-20 RX ADMIN — SODIUM CHLORIDE, PRESERVATIVE FREE 10 ML: 5 INJECTION INTRAVENOUS at 20:47

## 2025-03-20 RX ADMIN — LEVOTHYROXINE SODIUM 75 MCG: 0.07 TABLET ORAL at 11:41

## 2025-03-20 RX ADMIN — ATORVASTATIN CALCIUM 20 MG: 20 TABLET, FILM COATED ORAL at 11:41

## 2025-03-20 RX ADMIN — FERROUS SULFATE TAB 325 MG (65 MG ELEMENTAL FE) 325 MG: 325 (65 FE) TAB at 17:48

## 2025-03-20 RX ADMIN — PROPOFOL 50 MG: 10 INJECTION, EMULSION INTRAVENOUS at 10:18

## 2025-03-20 RX ADMIN — AMLODIPINE BESYLATE 2.5 MG: 5 TABLET ORAL at 11:42

## 2025-03-20 RX ADMIN — VALSARTAN 80 MG: 160 TABLET, FILM COATED ORAL at 20:42

## 2025-03-20 RX ADMIN — OXCARBAZEPINE 300 MG: 150 TABLET, FILM COATED ORAL at 20:42

## 2025-03-20 RX ADMIN — PROPOFOL 50 MG: 10 INJECTION, EMULSION INTRAVENOUS at 10:40

## 2025-03-20 RX ADMIN — PROPOFOL 100 MG: 10 INJECTION, EMULSION INTRAVENOUS at 10:15

## 2025-03-20 RX ADMIN — HYDROCHLOROTHIAZIDE 12.5 MG: 25 TABLET ORAL at 11:41

## 2025-03-20 RX ADMIN — GABAPENTIN 300 MG: 300 CAPSULE ORAL at 01:42

## 2025-03-20 RX ADMIN — LIDOCAINE HYDROCHLORIDE 50 MG: 20 INJECTION, SOLUTION EPIDURAL; INFILTRATION; INTRACAUDAL; PERINEURAL at 10:15

## 2025-03-20 RX ADMIN — ACETAMINOPHEN 650 MG: 325 TABLET ORAL at 17:48

## 2025-03-20 RX ADMIN — PROPOFOL 50 MG: 10 INJECTION, EMULSION INTRAVENOUS at 10:25

## 2025-03-20 RX ADMIN — PROPOFOL 50 MG: 10 INJECTION, EMULSION INTRAVENOUS at 10:35

## 2025-03-20 RX ADMIN — OXYBUTYNIN CHLORIDE 5 MG: 5 TABLET, EXTENDED RELEASE ORAL at 11:41

## 2025-03-20 RX ADMIN — OXCARBAZEPINE 300 MG: 150 TABLET, FILM COATED ORAL at 11:42

## 2025-03-20 RX ADMIN — PROPOFOL 50 MG: 10 INJECTION, EMULSION INTRAVENOUS at 10:30

## 2025-03-20 RX ADMIN — SODIUM CHLORIDE, PRESERVATIVE FREE 10 ML: 5 INJECTION INTRAVENOUS at 11:42

## 2025-03-20 RX ADMIN — SODIUM CHLORIDE: 9 INJECTION, SOLUTION INTRAVENOUS at 10:15

## 2025-03-20 RX ADMIN — OCTREOTIDE ACETATE 50 MCG/HR: 500 INJECTION, SOLUTION INTRAVENOUS; SUBCUTANEOUS at 06:53

## 2025-03-20 RX ADMIN — SODIUM CHLORIDE, PRESERVATIVE FREE 10 ML: 5 INJECTION INTRAVENOUS at 01:42

## 2025-03-20 RX ADMIN — VALSARTAN 80 MG: 160 TABLET, FILM COATED ORAL at 11:51

## 2025-03-20 ASSESSMENT — PAIN SCALES - GENERAL
PAINLEVEL_OUTOF10: 5
PAINLEVEL_OUTOF10: 0

## 2025-03-20 ASSESSMENT — PAIN DESCRIPTION - LOCATION: LOCATION: BACK

## 2025-03-20 ASSESSMENT — PAIN DESCRIPTION - ORIENTATION: ORIENTATION: LOWER

## 2025-03-20 ASSESSMENT — PAIN DESCRIPTION - DESCRIPTORS: DESCRIPTORS: ACHING

## 2025-03-20 NOTE — OP NOTE
[facility]       Colonoscopy Operative Report    Darshana Kraus  [id number]  1950      Procedure Type:   Colonoscopy with polypectomy (hot biopsy), argon plasma coagulation     Indications:    Iron deficiency anemia       Pre-operative Diagnosis: see indication above    Post-operative Diagnosis:  See findings below    :  Rod Li MD    Surgical Assistant: Circulator: Vasyl Larkin RN  Endoscopy Technician: Gonsalo Hilliard    Implants:  None    Referring Provider: Zaid Conti DO      Sedation:  MAC anesthesia      Procedure Details:  After informed consent was obtained with all risks and benefits of procedure explained and preoperative exam completed, the patient was taken to the endoscopy suite and placed in the left lateral decubitus position.  Upon sequential sedation as per above, a digital rectal exam was performed demonstrating internal hemorrhoids.  The Olympus videocolonoscope  was inserted in the rectum and carefully advanced to the cecum, which was identified by the ileocecal valve and appendiceal orifice, terminal ileum.  The cecum was identified by the ileocecal valve and appendiceal orifice.  The quality of preparation was good.  The colonoscope was slowly withdrawn with careful evaluation between folds. Retroflexion in the rectum was completed .     Findings:   Rectum: normal  Sigmoid: mild diverticulosis;    1 cm sessile polyp, removed by hot biopsies    Descending Colon: normal  Transverse Colon: normal  Ascending Colon: normal  Cecum: 1 cm AVM ( arteriovenous malformation) , it was oozing blood , I completely cauterized by using APC ( ARGON PLASMA COAGULATION)    Terminal Ileum: normal      Specimen Removed:   as above     Complications: None.     EBL:  None.    Impression:     see findings     Recommendations: --Await pathology.    May resume coumadin on 3/21/25  Resume PO   Dr Richey will follow    Signed By: Rod Li MD     3/20/2025  10:51 AM

## 2025-03-20 NOTE — ANESTHESIA PRE PROCEDURE
Department of Anesthesiology  Preprocedure Note       Name:  Darshana Kraus   Age:  74 y.o.  :  1950                                          MRN:  [id number]         Date:  3/20/2025      Surgeon: Surgeon(s):  Rod Li MD    Procedure: Procedure(s):  ESOPHAGOGASTRODUODENOSCOPY  COLONOSCOPY DIAGNOSTIC    Medications prior to admission:   Prior to Admission medications    Medication Sig Start Date End Date Taking? Authorizing Provider   warfarin (COUMADIN) 2.5 MG tablet Take 1 tablet (2.5 mg) by mouth every Monday, Wednesday, and Friday. Take 0.5 tablet (1.25 mg) all other days of the week. Or take as directed by the clinic. 25  Yes Quoc Olgiun MD   aspirin 81 MG chewable tablet Take 1 tablet by mouth daily 25  Yes Skylar Hale APRN - CNP   valsartan (DIOVAN) 80 MG tablet Take 1 tablet by mouth 2 times daily   Yes ProviderDakota MD   metFORMIN (GLUCOPHAGE-XR) 500 MG extended release tablet Take 1 tablet by mouth 2 times daily (with meals)   Yes Provider, MD Dakota   hydroCHLOROthiazide 12.5 MG tablet Take 1 tablet by mouth daily   Yes ProviderDakota MD   amLODIPine (NORVASC) 2.5 MG tablet Take 1 tablet by mouth once daily 25  Yes Yeison Barrett MD   venlafaxine (EFFEXOR XR) 75 MG extended release capsule  24  Yes ProviderDakota MD   gabapentin (NEURONTIN) 300 MG capsule Take 1 capsule by mouth 3 times daily as needed.   Yes ProviderDakota MD   acetaminophen (TYLENOL 8 HOUR ARTHRITIS PAIN) 650 MG extended release tablet Take 1 tablet by mouth every 8 hours as needed for Pain   Yes Provider, MD Dakota   atorvastatin (LIPITOR) 20 MG tablet Take 1 tablet by mouth daily   Yes Automatic Reconciliation, Ar   levothyroxine (SYNTHROID) 75 MCG tablet Take 1 tablet by mouth every morning (before breakfast)   Yes Automatic Reconciliation, Ar   OXcarbazepine (TRILEPTAL) 300 MG tablet Take 1 tablet by mouth 2 times daily   Yes Automatic Reconciliation, Ar

## 2025-03-20 NOTE — CARE COORDINATION
Care Management Initial Assessment       RUR:15%  Readmission? No  1st IM letter given? Yes - 3/19/25  1st  letter given: No    Anticipate d/c home with resumption of [facility] HH/SN, HH agency added to AVS    Family transport    -Suspected GIB, anemia, s/p blood transfusion  -GI, Cardiology following  -PT/OT consulted  -history of AFIB on warfarin, s/p ablation with Watchman 1/2025  -history of HTN, Bipolar, T2DM, Hyperlipidemia, Hypothyroidism      CM met with patient at bedside to introduce self and role. Pt resides with her boyfriend in an in-law suite for a home that is owned by her boyfriend's daughter.Pt has three sons. Pt reported prior hh with [facility] and would like to resume with their agency at d/c, referral sent in [facility].    ADLs: Independent  DME: cane and JENNIFER, however pt stated she does not use these ambulatory aides  PCP follow up: Dr. Zaid Conti, last seen six months ago, f/u as directed on [facility]  Previous Home Health: [facility]  Previous Skilled Nursing Facility: denies  Previous Inpatient Rehab: denies  Insurance verified: [employer]  Pharmacy: [facility] in [place] and [facility] Mail order, no copay concerns  Emergency Contact: SonJordy, [phone]    CM will follow patient progress and assist as needed with [facility] plan.       03/20/25 1040   Service Assessment   Patient Orientation Alert and Oriented;Person;Place;Situation;Self   Cognition Alert   History Provided By Patient   Primary Caregiver Self   Support Systems Spouse/Significant Other;Children   Patient's Healthcare Decision Maker is: Legal Next of Kin   PCP Verified by CM Yes   Last Visit to PCP Within last 6 months   Prior Functional Level Independent in ADLs/IADLs   Can patient return to prior living arrangement Unknown at present   Ability to make needs known: Good   Family able to assist with home care needs: Yes   Would you like for me to discuss the discharge plan with any other family members/significant  others, and if so, who? No   Financial Resources Medicare   Social/Functional History   Lives With Significant other  (Pt resides in an in-law suite of pt's boyfriend's daughter's home)   Type of Home House   Home Layout One level   Home Access Level entry   Home Equipment Cane;Walker - Rolling   Occupation Retired   Discharge Planning   Type of Residence House   Living Arrangements Spouse/Significant Other;Family Members   Current Services Prior To Admission Home Care   DME Ordered? No   Potential Assistance Purchasing Medications No   Type of Home Care Services Nursing Services   Patient expects to be discharged to: House   Services At/After Discharge   Transition of Care Consult (CM Consult) Home Health;Discharge Planning   Services At/After Discharge Home Health   Confirm Follow Up Transport Family   Condition of Participation: Discharge Planning   The Plan for Transition of Care is related to the following treatment goals: HH   The Patient and/or Patient Representative was provided with a Choice of Provider? Patient   The Patient and/Or Patient Representative agree with the Discharge Plan? Yes   Freedom of Choice list was provided with basic dialogue that supports the patient's individualized plan of care/goals, treatment preferences, and shares the quality data associated with the providers?  Yes

## 2025-03-20 NOTE — PLAN OF CARE
Problem: Discharge Planning  Goal: Discharge to home or other facility with appropriate resources  3/20/2025 1123 by Katerin Nobles, RN  Outcome: Progressing  Flowsheets (Taken 3/20/2025 0840)  Discharge to home or other facility with appropriate resources: Identify barriers to discharge with patient and caregiver     Problem: Safety - Adult  Goal: Free from fall injury  3/20/2025 1123 by Katerin Nobles, RN  Outcome: Progressing     Problem: Pain  Goal: Verbalizes/displays adequate comfort level or baseline comfort level  Outcome: Progressing

## 2025-03-20 NOTE — PROGRESS NOTES
TRANSFER - IN REPORT:    Verbal report received from LIGIA Betancur on Darshana Pack  being received from 209 for ordered procedure      Report consisted of patient's Situation, Background, Assessment and   Recommendations(SBAR).     Information from the following report(s) Nurse Handoff Report was reviewed with the receiving nurse.    Opportunity for questions and clarification was provided.      Assessment completed upon patient's arrival to unit and care assumed.     Verified patient name and date of birth, scheduled procedure, and informed consent. Reviewed general discharge instructions and  information.  Assessed patient. Awake, alert, and oriented per baseline. Vital signs stable (see vital sign flowsheet). Respiratory status within defined limits, abdomen soft and non tender. Skin with in defined limits.     Initial RN admission and assessment performed and documented in Endoscopy navigator.     Patient evaluated by anesthesia in pre-procedure holding.     All procedural vital signs, airway assessment, and level of consciousness information monitored and recorded by anesthesia staff on the anesthesia record.     Report received from CRNA post procedure.  Patient transported to recovery area by RN.    Endoscopy post procedure time out was performed and specimens were verified with physician.    Endoscope was pre-cleaned at bedside immediately following procedure by Gonsalo.    TRANSFER - OUT REPORT:    Verbal report given to LIGIA Betancur on Darshana Pack  being transferred to 209 for routine post-op       Report consisted of patient's Situation, Background, Assessment and   Recommendations(SBAR).     Information from the following report(s) Nurse Handoff Report was reviewed with the receiving nurse.           Lines:   Peripheral IV 03/18/25 Left;Dorsal Forearm (Active)   Site Assessment Clean, dry & intact 03/20/25 0445   Line Status Infusing 03/19/25 1040   Line Care Cap changed 03/20/25 0445   Phlebitis Assessment No  symptoms 03/20/25 0445   Infiltration Assessment 0 03/20/25 0445   Alcohol Cap Used Yes 03/20/25 0445   Dressing Status Clean, dry & intact 03/20/25 0445   Dressing Type Transparent 03/20/25 0445   Dressing Intervention New 03/18/25 1055        Opportunity for questions and clarification was provided.      Patient transported with:  Tech

## 2025-03-20 NOTE — PROGRESS NOTES
Perception  Overall Perceptual Status: WFL  Vision  Vision: Within Functional Limits     Range of Motion:   AROM: Within functional limits  PROM: Within functional limits      Strength:  Strength: Generally decreased, functional      Coordination:  Coordination: Within functional limits            Tone & Sensation:   Tone: Normal  Sensation: Intact      Functional Mobility and Transfers for ADLs:  Bed Mobility:     Bed Mobility Training  Bed Mobility Training: Yes  Supine to Sit: Independent  Sit to Supine: Independent  Scooting: Independent    Transfers:     Transfer Training  Transfer Training: Yes  Sit to Stand: Supervision  Stand to Sit: Supervision  Toilet Transfer: Supervision                                   Balance:      Balance  Sitting: Intact  Standing: Intact      ADL Assessment:          Feeding: Independent       Grooming: Supervision  Grooming Skilled Clinical Factors: standing at sink    UE Bathing: Supervision            LE Bathing: Supervision       UE Dressing: Supervision       LE Dressing: Supervision  LE Dressing Skilled Clinical Factors: seated EOB for sock management via tailor sitting    Toileting: Supervision                            ADL Intervention and task modifications:    Pt educated on safe transfer techniques, with specific emphasis on proper hand placement to push up from seated surface rather than attempt to pull self up, fully positioning self in-front of desired seated location, feeling chair on back of legs and reaching back with 1-2 UE to slowly lower self to seated position.            [facility] AM-PACTM \"6 Clicks\"                                                       Daily Activity Inpatient Short Form  AM-PAC Daily Activity - Inpatient   How much help is needed for putting on and taking off regular lower body clothing?: None  How much help is needed for bathing (which includes washing, rinsing, drying)?: None  How much help is needed for toileting (which

## 2025-03-20 NOTE — ANESTHESIA POSTPROCEDURE EVALUATION
Department of Anesthesiology  Postprocedure Note    Patient: Darshana Kraus  MRN: [id number]  YOB: 1950  Date of evaluation: 3/20/2025    Procedure Summary       Date: 03/20/25 Room / Location: [facility] 02 / [facility] ENDOSCOPY    Anesthesia Start: 1010 Anesthesia Stop: 1045    Procedures:       ESOPHAGOGASTRODUODENOSCOPY      COLONOSCOPY DIAGNOSTIC Diagnosis:       Anemia, unspecified type      (Anemia, unspecified type [D64.9])    Surgeons: Rod Li MD Responsible Provider: Andrew Piña MD    Anesthesia Type: MAC ASA Status: 2            Anesthesia Type: MAC    Mali Phase I: Mali Score: 10    Mali Phase II: Mali Score: 10    Anesthesia Post Evaluation    Patient location during evaluation: bedside  Nausea & Vomiting: no nausea  Cardiovascular status: blood pressure returned to baseline  Respiratory status: acceptable  Hydration status: euvolemic    There were no known notable events for this encounter.

## 2025-03-20 NOTE — OP NOTE
[facility]        [facility]- Gastroduodenoscopy (EGD) Procedure Note    Darshana Pack  1950  [id number]      Procedure: Endoscopic Gastroduodenoscopy --diagnostic    Indication: anemia      Pre-operative Diagnosis: see indication above    Post-operative Diagnosis: see findings below    : Rod Li MD    Surgical Assistant: Circulator: Vasyl Larkin RN  Endoscopy Technician: Gonsalo Hilliard    Implants:  None    Referring Provider:  Zaid Conti DO      Anesthesia/Sedation:  MAC anesthesia Propofol        Procedure Details     After infomed consent was obtained for the procedure, with all risks and benefits of procedure explained the patient was taken to the endoscopy suite and placed in the left lateral decubitus position.  Following sequential administration of sedation as per above, the endoscope was inserted into the mouth and advanced under direct vision to third portion of the duodenum.  A careful inspection was made as the gastroscope was withdrawn, including a retroflexed view of the proximal stomach; findings and interventions are described below.      Findings:   Esophagus:hiatal hernia 2 cm in size   Stomach: normal   Duodenum: normal      Therapies:  none    Specimens: none         EBL: None      Complications:   None; patient tolerated the procedure well.           Impression:    -See post-procedure diagnoses.    Recommendations:  -colonoscopy today     Signed By: Rod Li MD     3/20/2025  10:50 AM

## 2025-03-20 NOTE — ANESTHESIA PRE PROCEDURE
Department of Anesthesiology  Preprocedure Note       Name:  Darshana Kraus   Age:  74 y.o.  :  1950                                          MRN:  [id number]         Date:  3/20/2025      Surgeon: Surgeon(s):  Rod Li MD    Procedure: Procedure(s):  ESOPHAGOGASTRODUODENOSCOPY  COLONOSCOPY DIAGNOSTIC    Medications prior to admission:   Prior to Admission medications    Medication Sig Start Date End Date Taking? Authorizing Provider   warfarin (COUMADIN) 2.5 MG tablet Take 1 tablet (2.5 mg) by mouth every Monday, Wednesday, and Friday. Take 0.5 tablet (1.25 mg) all other days of the week. Or take as directed by the clinic. 25  Yes Quoc Olguin MD   aspirin 81 MG chewable tablet Take 1 tablet by mouth daily 25  Yes Skylar Hale APRN - CNP   valsartan (DIOVAN) 80 MG tablet Take 1 tablet by mouth 2 times daily   Yes ProviderDakota MD   metFORMIN (GLUCOPHAGE-XR) 500 MG extended release tablet Take 1 tablet by mouth 2 times daily (with meals)   Yes Provider, MD Dakota   hydroCHLOROthiazide 12.5 MG tablet Take 1 tablet by mouth daily   Yes ProviderDakota MD   amLODIPine (NORVASC) 2.5 MG tablet Take 1 tablet by mouth once daily 25  Yes Yeison Barrett MD   venlafaxine (EFFEXOR XR) 75 MG extended release capsule  24  Yes ProviderDakota MD   gabapentin (NEURONTIN) 300 MG capsule Take 1 capsule by mouth 3 times daily as needed.   Yes ProviderDakota MD   acetaminophen (TYLENOL 8 HOUR ARTHRITIS PAIN) 650 MG extended release tablet Take 1 tablet by mouth every 8 hours as needed for Pain   Yes Provider, MD Dakota   atorvastatin (LIPITOR) 20 MG tablet Take 1 tablet by mouth daily   Yes Automatic Reconciliation, Ar   levothyroxine (SYNTHROID) 75 MCG tablet Take 1 tablet by mouth every morning (before breakfast)   Yes Automatic Reconciliation, Ar   OXcarbazepine (TRILEPTAL) 300 MG tablet Take 1 tablet by mouth 2 times daily   Yes Automatic Reconciliation, Ar

## 2025-03-20 NOTE — PROGRESS NOTES
[facility]                                                                                          Hospitalist Progress Note  Sushma Madala, MD  Office Phone: [phone]        Date of Service:  3/20/2025  NAME:  Darshana Kraus  :  1950  MRN:  [id number]       Admission Summary:   Darshana Kraus is a 74 y.o. female witah pmhx atrial fibrillation on warfarin , bipolar 1 disorder, and HTN who presents with dizziness and melena, and is being admitted for acute blood loss anemia 2/2 suspected GIB.  She has noted dark stools for 1-2 months.  Yesterday OP labs revealed hgb 7.1, and she was advised to present to the ED for further management.  She endorses several months of melanotic stools.  She was last evaluated by gastroenterology many years ago with colonoscopy.  She does endorse significant EtOH of 24 pack of 12 ounce beer cans daily, but quit several months ago.     In the ED VSS.  Labs showed Hgb 7, and INR 1.2.  EKG normal sinus rhythm.          Interval history / Subjective:   Follow up GI bleed    Patient is seen and examined at bedside this AM. She feels ok. 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   -pt presented with melena  -transfuse for hgb <7  -s/p 1 unit PRBC 3/18  - iron deficient - started on opo iron   - s/p octreotide infusion   -continue pepcid  -Appreciate GI input  - EGD: hiatal hernia 2 cm   - colonoscopy : 1 cm AVM ( arteriovenous malformation) , it was oozing blood , completely cauterized by using APC  - started on regular diet      #Atrial fibrillation on warfarin s/p ablation and Watchman 2025  Hx SSS s/p PPM   -NSR on EKG  -INR 1.2  -hold warfarin in the setting of GIB  -Appreciate Cardiology  -EP cardiology consult pending   - GI ok to resume warfarin on 3/21      #HTN: c/w valsartan, metoprolol  #bipolar I disorder: continue home  injection 4 mg  4 mg IntraVENous Q6H PRN    polyethylene glycol (GLYCOLAX) packet 17 g  17 g Oral Daily PRN    acetaminophen (TYLENOL) tablet 650 mg  650 mg Oral Q6H PRN    Or    acetaminophen (TYLENOL) suppository 650 mg  650 mg Rectal Q6H PRN    0.9 % sodium chloride infusion   IntraVENous PRN    octreotide (SANDOSTATIN) 500 mcg in sodium chloride 0.9 % 100 mL infusion  50 mcg/hr IntraVENous Continuous     ______________________________________________________________________  EXPECTED LENGTH OF STAY: 2  ACTUAL LENGTH OF STAY:          2                 Sushma Madala, MD

## 2025-03-21 ENCOUNTER — ANESTHESIA (OUTPATIENT)
Facility: HOSPITAL | Age: 75
DRG: 378 | End: 2025-03-21
Payer: MEDICARE

## 2025-03-21 ENCOUNTER — ANESTHESIA EVENT (OUTPATIENT)
Facility: HOSPITAL | Age: 75
DRG: 378 | End: 2025-03-21
Payer: MEDICARE

## 2025-03-21 ENCOUNTER — HOSPITAL ENCOUNTER (OUTPATIENT)
Facility: HOSPITAL | Age: 75
Discharge: HOME | DRG: 378 | End: 2025-03-23
Attending: INTERNAL MEDICINE
Payer: MEDICARE

## 2025-03-21 VITALS
HEIGHT: 68 IN | WEIGHT: 186.07 LBS | OXYGEN SATURATION: 94 % | RESPIRATION RATE: 20 BRPM | DIASTOLIC BLOOD PRESSURE: 68 MMHG | HEART RATE: 93 BPM | BODY MASS INDEX: 28.2 KG/M2 | SYSTOLIC BLOOD PRESSURE: 133 MMHG | TEMPERATURE: 97.5 F

## 2025-03-21 VITALS
DIASTOLIC BLOOD PRESSURE: 50 MMHG | HEART RATE: 97 BPM | SYSTOLIC BLOOD PRESSURE: 141 MMHG | RESPIRATION RATE: 18 BRPM | OXYGEN SATURATION: 96 %

## 2025-03-21 DIAGNOSIS — I48.19 PERSISTENT ATRIAL FIBRILLATION (HCC): ICD-10-CM

## 2025-03-21 PROBLEM — K92.2 GASTROINTESTINAL HEMORRHAGE: Status: ACTIVE | Noted: 2025-03-21

## 2025-03-21 LAB
ANION GAP SERPL CALC-SCNC: 8 MMOL/L (ref 2–12)
BUN SERPL-MCNC: 8 MG/DL (ref 6–20)
BUN/CREAT SERPL: 11 (ref 12–20)
CALCIUM SERPL-MCNC: 9.6 MG/DL (ref 8.5–10.1)
CHLORIDE SERPL-SCNC: 95 MMOL/L (ref 97–108)
CO2 SERPL-SCNC: 27 MMOL/L (ref 21–32)
CREAT SERPL-MCNC: 0.76 MG/DL (ref 0.55–1.02)
ECHO BSA: 2.01 M2
ECHO TV REGURGITANT MAX VELOCITY: 1.71 M/S
ECHO TV REGURGITANT PEAK GRADIENT: 12 MMHG
ERYTHROCYTE [DISTWIDTH] IN BLOOD BY AUTOMATED COUNT: 15.2 % (ref 11.5–14.5)
GLUCOSE SERPL-MCNC: 130 MG/DL (ref 65–100)
HCT VFR BLD AUTO: 28.1 % (ref 35–47)
HGB BLD-MCNC: 9.4 G/DL (ref 11.5–16)
MCH RBC QN AUTO: 30.5 PG (ref 26–34)
MCHC RBC AUTO-ENTMCNC: 33.5 G/DL (ref 30–36.5)
MCV RBC AUTO: 91.2 FL (ref 80–99)
NRBC # BLD: 0 K/UL (ref 0–0.01)
NRBC BLD-RTO: 0 PER 100 WBC
PLATELET # BLD AUTO: 414 K/UL (ref 150–400)
PMV BLD AUTO: 9.2 FL (ref 8.9–12.9)
POTASSIUM SERPL-SCNC: 3.4 MMOL/L (ref 3.5–5.1)
RBC # BLD AUTO: 3.08 M/UL (ref 3.8–5.2)
SODIUM SERPL-SCNC: 130 MMOL/L (ref 136–145)
WBC # BLD AUTO: 10.5 K/UL (ref 3.6–11)

## 2025-03-21 PROCEDURE — 3700000001 HC ADD 15 MINUTES (ANESTHESIA)

## 2025-03-21 PROCEDURE — 99232 SBSQ HOSP IP/OBS MODERATE 35: CPT | Performed by: INTERNAL MEDICINE

## 2025-03-21 PROCEDURE — 3700000000 HC ANESTHESIA ATTENDED CARE

## 2025-03-21 PROCEDURE — 80048 BASIC METABOLIC PNL TOTAL CA: CPT

## 2025-03-21 PROCEDURE — 99222 1ST HOSP IP/OBS MODERATE 55: CPT | Performed by: HOSPITALIST

## 2025-03-21 PROCEDURE — 6370000000 HC RX 637 (ALT 250 FOR IP): Performed by: INTERNAL MEDICINE

## 2025-03-21 PROCEDURE — 6370000000 HC RX 637 (ALT 250 FOR IP): Performed by: HOSPITALIST

## 2025-03-21 PROCEDURE — 93325 DOPPLER ECHO COLOR FLOW MAPG: CPT | Performed by: INTERNAL MEDICINE

## 2025-03-21 PROCEDURE — 85027 COMPLETE CBC AUTOMATED: CPT

## 2025-03-21 PROCEDURE — 2580000003 HC RX 258: Performed by: NURSE ANESTHETIST, CERTIFIED REGISTERED

## 2025-03-21 PROCEDURE — 6360000002 HC RX W HCPCS: Performed by: NURSE ANESTHETIST, CERTIFIED REGISTERED

## 2025-03-21 PROCEDURE — 93312 ECHO TRANSESOPHAGEAL: CPT

## 2025-03-21 PROCEDURE — B24BZZ4 ULTRASONOGRAPHY OF HEART WITH AORTA, TRANSESOPHAGEAL: ICD-10-PCS | Performed by: INTERNAL MEDICINE

## 2025-03-21 PROCEDURE — 93312 ECHO TRANSESOPHAGEAL: CPT | Performed by: INTERNAL MEDICINE

## 2025-03-21 PROCEDURE — 93320 DOPPLER ECHO COMPLETE: CPT | Performed by: INTERNAL MEDICINE

## 2025-03-21 RX ORDER — FERROUS SULFATE 325(65) MG
325 TABLET ORAL 2 TIMES DAILY WITH MEALS
Qty: 60 TABLET | Refills: 0 | Status: SHIPPED | OUTPATIENT
Start: 2025-03-21

## 2025-03-21 RX ORDER — CLOPIDOGREL BISULFATE 75 MG/1
75 TABLET ORAL DAILY
Status: DISCONTINUED | OUTPATIENT
Start: 2025-03-21 | End: 2025-03-21 | Stop reason: HOSPADM

## 2025-03-21 RX ORDER — PROPOFOL 10 MG/ML
INJECTION, EMULSION INTRAVENOUS
Status: COMPLETED
Start: 2025-03-21 | End: 2025-03-21

## 2025-03-21 RX ORDER — METOPROLOL SUCCINATE 25 MG/1
25 TABLET, EXTENDED RELEASE ORAL DAILY
Qty: 30 TABLET | Refills: 0 | Status: SHIPPED | OUTPATIENT
Start: 2025-03-21

## 2025-03-21 RX ORDER — PHENYLEPHRINE HCL IN 0.9% NACL 0.4MG/10ML
SYRINGE (ML) INTRAVENOUS
Status: DISCONTINUED | OUTPATIENT
Start: 2025-03-21 | End: 2025-03-21 | Stop reason: SDUPTHER

## 2025-03-21 RX ORDER — CLOPIDOGREL BISULFATE 75 MG/1
75 TABLET ORAL DAILY
Qty: 30 TABLET | Refills: 0 | Status: SHIPPED | OUTPATIENT
Start: 2025-03-21

## 2025-03-21 RX ORDER — SODIUM CHLORIDE 9 MG/ML
INJECTION, SOLUTION INTRAVENOUS
Status: DISCONTINUED | OUTPATIENT
Start: 2025-03-21 | End: 2025-03-21 | Stop reason: SDUPTHER

## 2025-03-21 RX ORDER — ASPIRIN 81 MG/1
81 TABLET, CHEWABLE ORAL DAILY
Qty: 30 TABLET | Refills: 3 | Status: SHIPPED
Start: 2025-04-10

## 2025-03-21 RX ORDER — LIDOCAINE HYDROCHLORIDE 20 MG/ML
INJECTION, SOLUTION EPIDURAL; INFILTRATION; INTRACAUDAL; PERINEURAL
Status: COMPLETED
Start: 2025-03-21 | End: 2025-03-21

## 2025-03-21 RX ORDER — POTASSIUM CHLORIDE 750 MG/1
40 TABLET, EXTENDED RELEASE ORAL ONCE
Status: COMPLETED | OUTPATIENT
Start: 2025-03-21 | End: 2025-03-21

## 2025-03-21 RX ORDER — METOPROLOL SUCCINATE 25 MG/1
25 TABLET, EXTENDED RELEASE ORAL DAILY
Status: DISCONTINUED | OUTPATIENT
Start: 2025-03-21 | End: 2025-03-21 | Stop reason: HOSPADM

## 2025-03-21 RX ORDER — LIDOCAINE HYDROCHLORIDE 20 MG/ML
INJECTION, SOLUTION EPIDURAL; INFILTRATION; INTRACAUDAL; PERINEURAL
Status: DISCONTINUED | OUTPATIENT
Start: 2025-03-21 | End: 2025-03-21 | Stop reason: SDUPTHER

## 2025-03-21 RX ADMIN — ASPIRIN 81 MG: 81 TABLET, CHEWABLE ORAL at 11:13

## 2025-03-21 RX ADMIN — FERROUS SULFATE TAB 325 MG (65 MG ELEMENTAL FE) 325 MG: 325 (65 FE) TAB at 11:13

## 2025-03-21 RX ADMIN — POTASSIUM CHLORIDE 40 MEQ: 750 TABLET, EXTENDED RELEASE ORAL at 11:14

## 2025-03-21 RX ADMIN — LIDOCAINE HYDROCHLORIDE 60 MG: 20 INJECTION, SOLUTION EPIDURAL; INFILTRATION; INTRACAUDAL; PERINEURAL at 09:55

## 2025-03-21 RX ADMIN — ATORVASTATIN CALCIUM 20 MG: 20 TABLET, FILM COATED ORAL at 11:13

## 2025-03-21 RX ADMIN — METOPROLOL SUCCINATE 25 MG: 25 TABLET, EXTENDED RELEASE ORAL at 11:13

## 2025-03-21 RX ADMIN — SODIUM CHLORIDE: 9 INJECTION, SOLUTION INTRAVENOUS at 09:21

## 2025-03-21 RX ADMIN — OXYBUTYNIN CHLORIDE 5 MG: 5 TABLET, EXTENDED RELEASE ORAL at 11:13

## 2025-03-21 RX ADMIN — LEVOTHYROXINE SODIUM 75 MCG: 0.07 TABLET ORAL at 07:10

## 2025-03-21 RX ADMIN — OXCARBAZEPINE 300 MG: 150 TABLET, FILM COATED ORAL at 11:13

## 2025-03-21 RX ADMIN — GABAPENTIN 300 MG: 300 CAPSULE ORAL at 11:18

## 2025-03-21 RX ADMIN — BENZOCAINE, BUTAMBEN, AND TETRACAINE HYDROCHLORIDE 1 SPRAY: .028; .004; .004 AEROSOL, SPRAY TOPICAL at 09:31

## 2025-03-21 RX ADMIN — VALSARTAN 80 MG: 160 TABLET, FILM COATED ORAL at 11:18

## 2025-03-21 RX ADMIN — HYDROCHLOROTHIAZIDE 12.5 MG: 25 TABLET ORAL at 11:13

## 2025-03-21 RX ADMIN — Medication 80 MCG: at 10:01

## 2025-03-21 ASSESSMENT — PAIN SCALES - GENERAL: PAINLEVEL_OUTOF10: 0

## 2025-03-21 NOTE — PLAN OF CARE
Problem: Discharge Planning  Goal: Discharge to home or other facility with appropriate resources  3/21/2025 1258 by Gypsy Lee RN  Outcome: Completed  3/21/2025 0704 by Brenda Morin RN  Outcome: Progressing     Problem: Safety - Adult  Goal: Free from fall injury  3/21/2025 1258 by Gypsy Lee, RN  Outcome: Completed  3/21/2025 0704 by Brenda Morin, RN  Outcome: Progressing     Problem: Pain  Goal: Verbalizes/displays adequate comfort level or baseline comfort level  3/21/2025 1258 by Gypsy Lee, RN  Outcome: Completed  3/21/2025 0704 by Brenda Morin, RN  Outcome: Progressing

## 2025-03-21 NOTE — CARE COORDINATION
Transition of Care Plan:    RUR: 14%  Prior Level of Functioning: independent   Disposition: home with [facility]   PACHE[facility]: today  Follow up appointments: MD recommendations   DME needed: N  Transportation at discharge: family   IM/IMM Medicare/ letter given: 3/21  Caregiver Contact: Jordy / son / [phone]  Discharge Caregiver contacted prior to discharge? Y  Care Conference needed? N  Barriers to discharge: N    CM notified Es of PACHECO. AVS updated. Family to transport.    CM will continue to provide support as needed.    Darshana Chatman RN BSN CM    [facility] [employer]

## 2025-03-21 NOTE — ANESTHESIA PRE PROCEDURE
Department of Anesthesiology  Preprocedure Note       Name:  Darshana Kraus   Age:  74 y.o.  :  1950                                          MRN:  [id number]         Date:  3/21/2025      Surgeon: * No surgeons listed *    Procedure: * No procedures listed *    Medications prior to admission:   Prior to Admission medications    Medication Sig Start Date End Date Taking? Authorizing Provider   metoprolol succinate (TOPROL XL) 25 MG extended release tablet Take 1 tablet by mouth daily  Patient not taking: Reported on 3/18/2025 2/25/25   Bharati Correa, APRN - NP   warfarin (COUMADIN) 2.5 MG tablet Take 1 tablet (2.5 mg) by mouth every Monday, Wednesday, and Friday. Take 0.5 tablet (1.25 mg) all other days of the week. Or take as directed by the clinic. 25   Quoc Olguin MD   aspirin 81 MG chewable tablet Take 1 tablet by mouth daily 25   Skylar Hale APRN - CNP   valsartan (DIOVAN) 80 MG tablet Take 1 tablet by mouth 2 times daily    Dakota Gee MD   metFORMIN (GLUCOPHAGE-XR) 500 MG extended release tablet Take 1 tablet by mouth 2 times daily (with meals)    Dakota Gee MD   hydroCHLOROthiazide 12.5 MG tablet Take 1 tablet by mouth daily    Dakota Gee MD   Cyanocobalamin (VITAMIN B 12 PO) Take 5,000 mg by mouth daily  Patient not taking: Reported on 3/18/2025    Dakota Gee MD   amLODIPine (NORVASC) 2.5 MG tablet Take 1 tablet by mouth once daily 25   Yeison Barrett MD   folic acid (FOLVITE) 1 MG tablet Take 1 tablet by mouth Every Day  Patient not taking: Reported on 3/18/2025 8/18/24   Dakota Gee MD   venlafaxine (EFFEXOR XR) 75 MG extended release capsule  24   Dakota Gee MD   latanoprost (XALATAN) 0.005 % ophthalmic solution  3/20/24   Dakota Gee MD   clotrimazole-betamethasone (LOTRISONE) 1-0.05 % cream Apply topically 2 times daily Apply topically 2 times daily.  Patient not taking: Reported on 3/18/2025     Provider, MD Dakota   Triprolidine-Pseudoephedrine (ANTIHISTAMINE PO) Take 25 mg by mouth in the morning and at bedtime  Patient not taking: Reported on 1/9/2025    ProviderDakota MD   venlafaxine (EFFEXOR XR) 37.5 MG extended release capsule Take 1 capsule by mouth daily for 14 days  Patient not taking: Reported on 8/22/2024 4/16/24 8/22/24  Jong Ovalle Jr., MD   gabapentin (NEURONTIN) 300 MG capsule Take 1 capsule by mouth 3 times daily as needed.    Provider, MD Dakota   acetaminophen (TYLENOL 8 HOUR ARTHRITIS PAIN) 650 MG extended release tablet Take 1 tablet by mouth every 8 hours as needed for Pain    ProviderDakota MD   ARIPiprazole (ABILIFY) 10 MG tablet Take 1 tablet by mouth daily  Patient not taking: Reported on 1/31/2025    Automatic Reconciliation, Ar   atorvastatin (LIPITOR) 20 MG tablet Take 1 tablet by mouth daily    Automatic Reconciliation, Ar   levothyroxine (SYNTHROID) 75 MCG tablet Take 1 tablet by mouth every morning (before breakfast)    Automatic Reconciliation, Ar   OXcarbazepine (TRILEPTAL) 300 MG tablet Take 1 tablet by mouth 2 times daily    Automatic Reconciliation, Ar   oxybutynin (DITROPAN-XL) 5 MG extended release tablet Take 1 tablet by mouth daily    Automatic Reconciliation, Ar       Current medications:    No current facility-administered medications for this encounter.     No current outpatient medications on file.     Facility-Administered Medications Ordered in Other Encounters   Medication Dose Route Frequency Provider Last Rate Last Admin   • potassium chloride (KLOR-CON) extended release tablet 40 mEq  40 mEq Oral Once Madala, Sushma, MD       • metoprolol succinate (TOPROL XL) extended release tablet 25 mg  25 mg Oral Daily Fernie Florence MD       • atorvastatin (LIPITOR) tablet 20 mg  20 mg Oral Daily Madala, Sushma, MD   20 mg at 03/20/25 1141   • aspirin chewable tablet 81 mg  81 mg Oral Daily Madala, Sushma, MD       • hydroCHLOROthiazide

## 2025-03-21 NOTE — PROGRESS NOTES
TRANSFER - IN REPORT:    Verbal report received from Floor RN(name) on Darshana Pack  being received from 2 N (unit) for ordered procedure      Report consisted of patient’s Situation, Background, Assessment and   Recommendations(SBAR).     Information from the following report(s) Adult Overview, Recent Results, and Cardiac Rhythm    was reviewed with the receiving nurse.    Opportunity for questions and clarification was provided.      Assessment completed upon patient’s arrival to unit and care assume

## 2025-03-21 NOTE — PROGRESS NOTES
LALO done  Watchman--well-seated at appropriate location with no peridevice leak  Ejection fraction normal 55 to 60%  Okay to hold Coumadin  Will discuss the case with Dr. Olguin  As per discussion with Dr. Olguin start Plavix 75 mg daily today and then start aspirin 81 mg 1/14/2024  Follow-up with

## 2025-03-21 NOTE — PROGRESS NOTES
[facility] CARDIOLOGY  Cardiology Care follow up inpatient Note                                      Patient Name: Darshana Pack - :1950 - MRN:[id number]  Primary Cardiologist: Fernie Florence MD  Consulting Cardiologist: Dr Fernie Florence      3/21/2025 8:46 AM     Reason for consult : Atrial fibrillation with Watchman device      Assessment/Plan/Discussion: Cardiology Attending:         ASSESSMENT  History of atrial fibrillation status post ablation status post Watchman device  History of sick sinus node syndrome status post pacemaker  Hypertension  History of anemia with melena cecal AVM bleed      PLAN     Patient presented to the hospital with GI bleeding subsequently had undergone colonoscopy and endoscopy where patient was found to have cecal AVM status post cautery and hemorrhoids now discontinued Coumadin  Patient had undergone Watchman device in near past for recurrent GI bleeding.  Plan to do transesophageal echocardiography to assess Watchman device location and peridevice leak at 45 days postprocedure  Continue aspirin 81 mg daily  Risk-benefit alternative of procedure explained to the patient  Consent taken  Regarding history of paroxysmal atrial fibrillation now in normal sinus rhythm--plan to start Toprol-XL 25 mg once daily and discontinue amlodipine  Blood pressure fairly well-controlled on hydrochlorothiazide valsartan 80 mg twice daily  Continue levothyroxine for hypothyroidism  Outpatient follow-up with me next available            Dr Fernie Florence MD, [facility]AC[facility] [place]  [facility]    We discussed the expected course, resolution and complications of the diagnosis(es) in detail.  Medication risks, benefits, costs, interactions, and alternatives were discussed as indicated.            HPI:      Darshana is a 74 y.o. female with  venous access was obtained under ultrasound guidance.  Baseline ICE images showed no pericardial effusion.  Single transseptal puncture was performed for LA entry with mid and inferior puncture.  Patient was in sinus rhythm.  Normal voltage noted on substrate map.    Proceeded with right sided PVI WACA which was challenging.  Multiple epicardial connections were noted which needed detailed mapping and ablation.  Further ablation was needed along the posterior antrum as well as anterior mid dennis.  New connections were then loaded within the dennis and a carinal line was performed.  Right sided veins isolation was then achieved.    Then proceeded with left-sided PVI.  Left-sided vein was very irritable and patient went into atrial fibrillation due to catheter ectopy at this place multiple times.  First-pass isolation was not achieved on left-sided veins.  Patient went back into atrial fibrillation and required DCCV to sinus rhythm further mapping was needed and further ablation was needed in the posterior antrum as well as in the dennis.  A left sided carinal line was completed.    Entrance and exit block was confirmed in all 4 pulmonary veins.    On stopping pacing, patient had hypotension requiring pacing again.  Atrial pacing was continued.    Then proceeded with Watchman device implantation as detailed below.      LAB PHYSICIAN: Quoc Olguin MD    INDICATION FOR PROCEDURE: Symptomatic persistent atrial fibrillation intolerant or refractory to anti-arrhythmic medication  ?  CONSENT:  The purpose and nature of the procedure, potential benefits and risks, and available alternatives have been described to the patient in detail.  All questions were answered.? The patient expressed understanding, agreed to proceed and provided written informed consent.    PRE-PROCEDURE IMAGING:  A 64 Slice CT Angiogram was obtained and reviewed prior to the procedure which demonstrated 2 left pulmonary vein ostia and 3 right

## 2025-03-21 NOTE — PROGRESS NOTES
Discharge instructions reviewed with patient. patient provided verbal understanding off discharge instructions. Opportunity for questions and clarification provided. Patient discharged to home via family transportation. Patient taken off unit in wheelchair in no acute distress.

## 2025-03-21 NOTE — DISCHARGE SUMMARY
Discharge Summary       PATIENT ID: Darshana Kraus  MRN: [id number]   YOB: 1950    DATE OF ADMISSION: 3/18/2025 12:01 PM    DATE OF DISCHARGE: 3/21/2025    PRIMARY CARE PROVIDER: Zaid Conti DO     ATTENDING PHYSICIAN: Dr. Dumont   DISCHARGING PROVIDER: Sushma Madala, MD    To contact this individual call [phone] and ask the  to page.  If unavailable ask to be transferred the Adult Hospitalist Department.    CONSULTATIONS: IP CONSULT TO GI  IP CONSULT TO CARDIOLOGY  IP CONSULT TO CASE MANAGEMENT  IP CONSULT TO CASE MANAGEMENT    PROCEDURES/SURGERIES: Procedure(s):  ESOPHAGOGASTRODUODENOSCOPY  COLONOSCOPY DIAGNOSTIC    DIAGNOSES & HOSPITAL COURSE:   Per HPI:\"Darshana Kraus is a 74 y.o. female witah pmhx atrial fibrillation on warfarin , bipolar 1 disorder, and HTN who presents with dizziness and melena, and is being admitted for acute blood loss anemia 2/2 suspected GIB.  She has noted dark stools for 1-2 months.  Yesterday OP labs revealed hgb 7.1, and she was advised to present to the ED for further management.  She endorses several months of melanotic stools.  She was last evaluated by gastroenterology many years ago with colonoscopy.  She does endorse significant EtOH of 24 pack of 12 ounce beer cans daily, but quit several months ago.     In the ED VSS.  Labs showed Hgb 7, and INR 1.2.  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    DISPOSITION:   X Home With:   OT  PT X HH  RN       Long term SNF/Inpatient Rehab    Independent/assisted living    Hospice    Other:       PATIENT CONDITION AT DISCHARGE:     Functional status    Poor     Deconditioned    X Independent      Cognition     Lucid     Forgetful     Dementia      Catheters/lines (plus indication)    Ferrara     PICC     PEG     None      Code status    X Full code     DNR      PHYSICAL EXAMINATION AT DISCHARGE:    General : alert x 3, awake, no acute distress,   HEENT: PEERL, EOMI, moist mucus membrane  Neck: supple, no JVD, no meningeal signs  Chest: Clear to auscultation bilaterally   CVS: S1 S2 heard, Capillary refill less than 2 seconds  Abd: soft/ Non tender, non distended, BS physiological,   Ext: no clubbing, no cyanosis, no edema, brisk 2+ DP pulses  Neuro/Psych: pleasant mood and affect, CN 2-12 grossly intact, sensory grossly within normal limit, Strength 5/5 in all extremities  Skin: warm     CHRONIC MEDICAL DIAGNOSES:      Greater than 31 minutes were spent with the patient on counseling and coordination of care    Signed:   Sushma Madala, MD  3/21/2025  11:26 AM

## 2025-03-21 NOTE — PROGRESS NOTES
[facility]  ITZ Miller  [phone]           GI PROGRESS NOTE        NAME: Darshana Kraus   :  1950   MRN:  [id number]       Subjective:   GI Bleed      Objective:   Doing well. No complaints. Wanting to go home      VITALS:   Last 24hrs VS reviewed since prior progress note. Most recent are:  Vitals:    25 1206   BP:    Pulse: 93   Resp:    Temp:    SpO2:        Intake/Output Summary (Last 24 hours) at 3/21/2025 1224  Last data filed at 3/21/2025 1009  Gross per 24 hour   Intake 210 ml   Output --   Net 210 ml       PHYSICAL EXAM:  General: Alert, in no acute distress    HEENT: Anicteric sclerae.  Lungs:            CTA Bilaterally.   Heart:  Regular  rhythm,    Abdomen: Soft, Non distended, Non tender.  (+)Bowel sounds, no HSM  Extremities: No c/c/e  Neurologic:  CN 2-12 gi, Alert and oriented X 3.  No acute neurological distress   Psych:   Good insight. Not anxious nor agitated.    Lab Data Reviewed:   Recent Labs     25  0639 25  0429   WBC  --  10.5   HGB 9.1* 9.4*   HCT 28.0* 28.1*   PLT  --  414*     Recent Labs     25  0639 25  0429    130*   K 4.0 3.4*    95*   CO2 27 27   BUN 8 8     No results for input(s): \"TP\", \"GLOB\", \"GGT\" in the last 72 hours.    Invalid input(s): \"SGOT\", \"GPT\", \"AP\", \"TBIL\", \"ALB\", \"AML\", \"AMYP\", \"LPSE\", \"HLPSE\"    ________________________________________________________________________  Patient Active Problem List   Diagnosis    Hypoxia    PAF (paroxysmal atrial fibrillation) (HCC)    Primary hypertension    Dilated cardiomyopathy (HCC)    Bipolar 1 disorder (HCC)    A-fib (HCC)    Paroxysmal atrial fibrillation (HCC)    Sick sinus syndrome (HCC)    Presence of Watchman left atrial appendage closure device    Acute blood loss anemia         Assessment and Plan:  GI Bleed - s/p EGD/colonoscopy yesterday. EGD was negative aside from small HH. Colonoscopy showed cecal AVM which was treated effectively with APC.      Her Hgb is stable at 9.4 today. From GI standpoint, ok to restart warfarin and d/c.    Can follow up as needed with our office as outpatient.       Signed By: Jw Oh PA-C     3/21/2025  12:24 PM

## 2025-03-21 NOTE — DISCHARGE INSTRUCTIONS
Discharge Instructions       PATIENT ID: Darshana Kraus  MRN: [id number]   YOB: 1950    DATE OF ADMISSION: [unfilled]    DATE OF DISCHARGE: 3/21/2025    PRIMARY CARE PROVIDER: @PCP@     ATTENDING PHYSICIAN: [unfilled]  DISCHARGING PROVIDER: Sushma Madala, MD    To contact this individual call [phone] and ask the  to page.   If unavailable ask to be transferred the Adult Hospitalist Department.    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     CONSULTATIONS: [unfilled]    PROCEDURES/SURGERIES: Procedure(s):  ESOPHAGOGASTRODUODENOSCOPY  COLONOSCOPY DIAGNOSTIC    PENDING TEST RESULTS:   At the time of discharge the following test results are still pending: none     FOLLOW UP APPOINTMENTS:   [unfilled]   PCP in 1-2 weeks   Cardiology in 2 weeks   GI as needed     ADDITIONAL CARE RECOMMENDATIONS:   Labs - cbc, bmp in 1 week   Stop Coumadin   Restart Aspirin on 4/10     DIET: cardiac diet  Oral Nutritional Supplements:    ACTIVITY: activity as tolerated    Radiology      DISCHARGE MEDICATIONS:   See Medication Reconciliation Form    It is important that you take the medication exactly as they are prescribed.   Keep your medication in the bottles provided by the pharmacist and keep a list of the medication names, dosages, and times to be taken in your wallet.   Do not take other medications without consulting your doctor.       NOTIFY YOUR PHYSICIAN FOR ANY OF THE FOLLOWING:   Fever over 101 degrees for 24 hours.   Chest pain, shortness of breath, fever, chills, nausea, vomiting, diarrhea, change in mentation, falling, weakness, bleeding. Severe pain or pain not relieved by medications.  Or, any other signs or symptoms that you may have questions about.      DISPOSITION:  X  Home With:   OT  PT X HH  RN       SNF/Inpatient Rehab/LTAC    Independent/assisted living    Hospice    Other:       Signed:   Sushma Madala, MD  3/21/2025  11:25 AM

## 2025-03-21 NOTE — PROGRESS NOTES
TRANSFER - OUT REPORT:    Verbal report given to Floor RN (name) on Darshana Pack being transferred to (unit) for routine progression of patient care       Report consisted of patient's Situation, Background, Assessment and   Recommendations(SBAR).     Information from the following report(s) Adult Overview, Intake/Output, Recent Results, and Cardiac Rhythm SR  was reviewed with the receiving nurse.    Opportunity for questions and clarification was provided.      Patient transported with:   Tech

## 2025-03-21 NOTE — PROGRESS NOTES
PT Contact Note    Pt chart reviewed in preparation for PT evaluation. Pt currently off unit for LALO. Will follow up with patient as able/appropriate.    Thank you,  Socorro Alicia, PT, DPT

## 2025-03-21 NOTE — PROGRESS NOTES
Physician Progress Note      PATIENT:               LIVIER RODRIGUEZ  CSN #:                  [id number]  :                       1950  ADMIT DATE:       3/18/2025 12:01 PM  DISCH DATE:  RESPONDING  PROVIDER #:        Sushma Madala MD          QUERY TEXT:    Patient admitted with acute blood loss anemia 2/2 suspected GIB, noted to have   mild diverticulosis, Cecal AVM on Colonoscopy 3/20. If possible, please   document in progress notes and discharge summary the cause of the GI bleeding:    The medical record reflects the following:  Risk Factors: Diverticulosis, internal hemorrhoids, Cecal AVM, HTN, DM, 74   year old F    Clinical Indicators: H&P  \" 74 y.o. female presents with dizziness and   melena, and is being admitted for acute blood loss anemia 2/2 suspected GIB.    She has noted dark stools for 1-2 months. \"    IM PN  \" Cecal AVM s/p cautery 3/20 -pt presented with melena -s/p 1 unit   PRBC 3/18  colonoscopy : 1 cm AVM ( arteriovenous malformation) , it was oozing blood ,   completely cauterized by using APC \"    Colonoscopy  \" a digital rectal exam was performed demonstrating internal   hemorrhoids. 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

## 2025-03-22 NOTE — CONSULTS
[facility] CARDIOLOGY  Cardiac Electrophysiology Note    Initial Encounter/Consult Note    Patient seen and examined on 3/21/2025 inpatient consultation on request of Dr. Fernie Florence and Dr. Dumont      Reason for encounter:   AF, GI bleed    HPI:    Darshana Kraus is a pleasant 74 y.o. female.  She is a resident of [facility] [address].    Primary Cardiologist: Fernie Florence MD     She is retired. 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 She lives with her partner.     Her current medical conditions and PMH are detailed below.     Previous Office visit:  Date: 10/28/2024   She has been referred for management of atrial fibrillation and consideration of watchman.  She has history of alcohol abuse and anemia she is also had falls.  She reports that she has had issues with taking Eliquis due to cost.  Currently on amiodarone and denies any palpitations.  Previously she has felt fatigued and generally not well in atrial fibrillation and she was initially diagnosed in 2022      Inpatient consult visit:  Date: 3/21/2025  She underwent AF ablation and Watchman device implantation on 1/31/2025.  She has been on warfarin and aspirin.  She got admitted due to anemia and melena. Hemoglobin was 7 and required blood transfusion.  GI perform endoscopy and was found to have AVM s/p cauterization.  Her hemoglobin has now been stable at the most recent result hemoglobin 9.4.  EP team consulted regarding management plan atrial fibrillation antiplatelets and anticoagulation with recent AF ablation and Watchman.  Telemetry reviewed and patient remains in sinus rhythm.      Cardiac Rhythm Testing     All EKGs were personally interpreted by me as below    EKG reviewed from April 9, 2024 shows atrial fibrillation with RVR, ventricular rate 142 bpm  EKG reviewed from 8/22/2024 shows atrial fibrillation with controlled ventricular rate 67 bpm  EKG reviewed from  Tests, Imaging and Labs Data:  01/31/25    ECHO (TTE) LIMITED (PRN CONTRAST/BUBBLE/STRAIN/3D) 02/01/2025 11:49 AM (Final)    Interpretation Summary    Left Ventricle: Normal left ventricular systolic function with a visually estimated EF of 55 - 60%. Left ventricle size is normal. Mildly increased wall thickness. Findings consistent with mild concentric hypertrophy. Normal wall motion.    Aortic Valve: Moderate sclerosis of the aortic valve cusps. No significant stenosis.    Left Atrium: Left atrium is moderately dilated.  LA size is 4.7 cm    Pericardium: Small (<1 cm) localized pericardial effusion present around the right ventricle. No indication of cardiac tamponade. Pericardial posterior effusion measures 0.8 cm.    Image quality is adequate.    Signed by: Donna Marsh on 2/1/2025 11:49 AM    01/31/25    CARDIAC PROCEDURE 01/31/2025  5:54 PM (Final)  ELECTROPHYSIOLOGY PROCEDURE 01/31/2025  5:54 PM (Final)    Conclusion  Table formatting from the original result was not included.  Images from the original result were not included.  ELECTROPHYSIOLOGY PROCEDURE    PROCEDURE DATE: 1/31/2025    PROCEDURES:  Paroxysmal Atrial fibrillation RF Ablation (QDOT)  Bilateral Pulmonary Vein Isolation - Wide Area Circumferential  Bilateral carinal line  EP study with CS catheter placement and pacing  Single transseptal puncture  Right and left heart catheterization  Intracardiac echocardiography  Ultrasound for venous access  Three-dimensional intracardiac electroanatomic mapping  Left atrial pacing/recording  Esophageal Cooling with Enso ETM balloon  Acute drug testing/IV Drug-Isuprel Administration  External direct current cardioversion  General endotracheal anesthesia    Left atrial appendage occlusion with Watchman device.  See below        Patient updated history with ablation and watchman:    # Persistent Atrial Fibrillation  [id number] score of 3 [for HTN, Age-1, and Female]  Anticoagulation: starting

## 2025-03-25 ENCOUNTER — TELEPHONE (OUTPATIENT)
Facility: HOSPITAL | Age: 75
End: 2025-03-25

## 2025-03-25 NOTE — TELEPHONE ENCOUNTER
Medication Management [facility]     Per chart review, patient's warfarin therapy has been discontinued. Patient had a watchman placement on 1/31/25. Patient had a LALO done on 3/21/25 and showed the Watchman was at the appropriate location with no peridevice leak. Patient started Plavix on 3/21/25 and ASA will be started on 4/10/25. The [facility] Management [facility] will sign off on patient's chart at this time.    Thank you.  Sakshi Chino, PharmD

## 2025-03-29 NOTE — ANESTHESIA POSTPROCEDURE EVALUATION
Department of Anesthesiology  Postprocedure Note    Patient: Darshana Kraus  MRN: [id number]  YOB: 1950  Date of evaluation: 3/29/2025    Procedure Summary       Date: 03/21/25 Room / Location: [facility] NON-INVASIVE CARDIOLOGY    Anesthesia Start: 0951 Anesthesia Stop: 1013    Procedure: LALO (PRN CONTRAST/BUBBLE/3D) Diagnosis: Persistent atrial fibrillation (HCC)    Scheduled Providers: Fernie Florence MD Responsible Provider: Oseas Gillespie MD    Anesthesia Type: MAC ASA Status: 3            Anesthesia Type: MAC    Mali Phase I: Mali Score: 10    Mali Phase II:      Anesthesia Post Evaluation    Patient location during evaluation: PACU  Patient participation: complete - patient participated  Level of consciousness: responsive to verbal stimuli and sleepy but conscious  Pain score: 2  Airway patency: patent  Cardiovascular status: blood pressure returned to baseline  Respiratory status: acceptable  Hydration status: stable  Comments: +Post-Anesthesia Evaluation and Assessment    Patient: Darshana Kraus MRN: [id number]  SSN: [id number]   YOB: 1950  Age: 74 y.o.  Sex: female          Cardiovascular Function/Vital Signs    BP (!) 141/50   Pulse 97   Resp 18   SpO2 96%     Patient is status post * No procedures listed *.    Nausea/Vomiting: Controlled.    Postoperative hydration reviewed and adequate.    Pain:      Managed.    Neurological Status:       At baseline.    Mental Status and Level of Consciousness: Arousable.    Pulmonary Status:       Adequate oxygenation and airway patent.    Complications related to anesthesia: None    Post-anesthesia assessment completed. No concerns.    I have evaluated the patient and the patient is stable and ready to be discharged from PACU .    Signed By: Oseas Gillespie MD    3/29/2025    Multimodal analgesia pain management approach  Pain management: satisfactory to patient    No notable events documented.

## 2025-04-03 ENCOUNTER — APPOINTMENT (OUTPATIENT)
Facility: HOSPITAL | Age: 75
End: 2025-04-03
Payer: MEDICARE

## 2025-04-03 ENCOUNTER — ANESTHESIA (OUTPATIENT)
Facility: HOSPITAL | Age: 75
End: 2025-04-03
Payer: MEDICARE

## 2025-04-03 ENCOUNTER — HOSPITAL ENCOUNTER (INPATIENT)
Facility: HOSPITAL | Age: 75
LOS: 2 days | Discharge: HOME | End: 2025-04-05
Attending: STUDENT IN AN ORGANIZED HEALTH CARE EDUCATION/TRAINING PROGRAM | Admitting: FAMILY MEDICINE
Payer: MEDICARE

## 2025-04-03 ENCOUNTER — ANESTHESIA EVENT (OUTPATIENT)
Facility: HOSPITAL | Age: 75
End: 2025-04-03
Payer: MEDICARE

## 2025-04-03 DIAGNOSIS — D64.9 ANEMIA, UNSPECIFIED TYPE: ICD-10-CM

## 2025-04-03 DIAGNOSIS — K92.2 GASTROINTESTINAL HEMORRHAGE, UNSPECIFIED GASTROINTESTINAL HEMORRHAGE TYPE: Primary | ICD-10-CM

## 2025-04-03 LAB
ABO + RH BLD: NORMAL
ALBUMIN SERPL-MCNC: 3.7 G/DL (ref 3.5–5)
ALBUMIN/GLOB SERPL: 0.8 (ref 1.1–2.2)
ALP SERPL-CCNC: 120 U/L (ref 45–117)
ALT SERPL-CCNC: 17 U/L (ref 12–78)
ANION GAP SERPL CALC-SCNC: 2 MMOL/L (ref 2–12)
APPEARANCE UR: CLEAR
AST SERPL-CCNC: 25 U/L (ref 15–37)
BACTERIA URNS QL MICRO: NEGATIVE /HPF
BASOPHILS # BLD: 0.02 K/UL (ref 0–0.1)
BASOPHILS NFR BLD: 0.4 % (ref 0–1)
BILIRUB SERPL-MCNC: 0.5 MG/DL (ref 0.2–1)
BILIRUB UR QL: NEGATIVE
BLOOD GROUP ANTIBODIES SERPL: NORMAL
BUN SERPL-MCNC: 11 MG/DL (ref 6–20)
BUN/CREAT SERPL: 20 (ref 12–20)
CALCIUM SERPL-MCNC: 9.5 MG/DL (ref 8.5–10.1)
CHLORIDE SERPL-SCNC: 105 MMOL/L (ref 97–108)
CO2 SERPL-SCNC: 28 MMOL/L (ref 21–32)
COLOR UR: ABNORMAL
CREAT SERPL-MCNC: 0.55 MG/DL (ref 0.55–1.02)
DIFFERENTIAL METHOD BLD: ABNORMAL
EOSINOPHIL # BLD: 0.35 K/UL (ref 0–0.4)
EOSINOPHIL NFR BLD: 7.7 % (ref 0–7)
EPITH CASTS URNS QL MICRO: ABNORMAL /LPF
ERYTHROCYTE [DISTWIDTH] IN BLOOD BY AUTOMATED COUNT: 16.2 % (ref 11.5–14.5)
GLOBULIN SER CALC-MCNC: 4.5 G/DL (ref 2–4)
GLUCOSE SERPL-MCNC: 96 MG/DL (ref 65–100)
GLUCOSE UR STRIP.AUTO-MCNC: NEGATIVE MG/DL
HCT VFR BLD AUTO: 27.2 % (ref 35–47)
HGB BLD-MCNC: 8.5 G/DL (ref 11.5–16)
HGB UR QL STRIP: NEGATIVE
IMM GRANULOCYTES # BLD AUTO: 0.01 K/UL (ref 0–0.04)
IMM GRANULOCYTES NFR BLD AUTO: 0.2 % (ref 0–0.5)
INR PPP: 1.2 (ref 0.9–1.1)
KETONES UR QL STRIP.AUTO: NEGATIVE MG/DL
LEUKOCYTE ESTERASE UR QL STRIP.AUTO: ABNORMAL
LIPASE SERPL-CCNC: 29 U/L (ref 13–75)
LYMPHOCYTES # BLD: 0.92 K/UL (ref 0.8–3.5)
LYMPHOCYTES NFR BLD: 20.3 % (ref 12–49)
MCH RBC QN AUTO: 30 PG (ref 26–34)
MCHC RBC AUTO-ENTMCNC: 31.3 G/DL (ref 30–36.5)
MCV RBC AUTO: 96.1 FL (ref 80–99)
MONOCYTES # BLD: 0.39 K/UL (ref 0–1)
MONOCYTES NFR BLD: 8.6 % (ref 5–13)
NEUTS SEG # BLD: 2.84 K/UL (ref 1.8–8)
NEUTS SEG NFR BLD: 62.8 % (ref 32–75)
NITRITE UR QL STRIP.AUTO: NEGATIVE
NRBC # BLD: 0 K/UL (ref 0–0.01)
NRBC BLD-RTO: 0 PER 100 WBC
PH UR STRIP: 7 (ref 5–8)
PLATELET # BLD AUTO: 321 K/UL (ref 150–400)
PMV BLD AUTO: 9.5 FL (ref 8.9–12.9)
POTASSIUM SERPL-SCNC: 3.9 MMOL/L (ref 3.5–5.1)
PROT SERPL-MCNC: 8.2 G/DL (ref 6.4–8.2)
PROT UR STRIP-MCNC: NEGATIVE MG/DL
PROTHROMBIN TIME: 12.3 SEC (ref 9.2–11.2)
RBC # BLD AUTO: 2.83 M/UL (ref 3.8–5.2)
RBC #/AREA URNS HPF: ABNORMAL /HPF (ref 0–5)
SODIUM SERPL-SCNC: 135 MMOL/L (ref 136–145)
SP GR UR REFRACTOMETRY: 1.03 (ref 1–1.03)
SPECIMEN EXP DATE BLD: NORMAL
TROPONIN I SERPL HS-MCNC: 20 NG/L (ref 0–51)
URINE CULTURE IF INDICATED: ABNORMAL
UROBILINOGEN UR QL STRIP.AUTO: 0.2 EU/DL (ref 0.2–1)
WBC # BLD AUTO: 4.5 K/UL (ref 3.6–11)
WBC URNS QL MICRO: ABNORMAL /HPF (ref 0–4)
YEAST BUDDING URNS QL: PRESENT

## 2025-04-03 PROCEDURE — 81001 URINALYSIS AUTO W/SCOPE: CPT

## 2025-04-03 PROCEDURE — 3700000000 HC ANESTHESIA ATTENDED CARE: Performed by: INTERNAL MEDICINE

## 2025-04-03 PROCEDURE — 2500000003 HC RX 250 WO HCPCS: Performed by: NURSE PRACTITIONER

## 2025-04-03 PROCEDURE — 83690 ASSAY OF LIPASE: CPT

## 2025-04-03 PROCEDURE — 85025 COMPLETE CBC W/AUTO DIFF WBC: CPT

## 2025-04-03 PROCEDURE — 6360000002 HC RX W HCPCS: Performed by: NURSE ANESTHETIST, CERTIFIED REGISTERED

## 2025-04-03 PROCEDURE — 0DJ08ZZ INSPECTION OF UPPER INTESTINAL TRACT, VIA NATURAL OR ARTIFICIAL OPENING ENDOSCOPIC: ICD-10-PCS | Performed by: INTERNAL MEDICINE

## 2025-04-03 PROCEDURE — 6370000000 HC RX 637 (ALT 250 FOR IP): Performed by: NURSE PRACTITIONER

## 2025-04-03 PROCEDURE — 85610 PROTHROMBIN TIME: CPT

## 2025-04-03 PROCEDURE — 7100000011 HC PHASE II RECOVERY - ADDTL 15 MIN: Performed by: INTERNAL MEDICINE

## 2025-04-03 PROCEDURE — 86900 BLOOD TYPING SEROLOGIC ABO: CPT

## 2025-04-03 PROCEDURE — 2060000000 HC ICU INTERMEDIATE R&B

## 2025-04-03 PROCEDURE — 2580000003 HC RX 258: Performed by: NURSE PRACTITIONER

## 2025-04-03 PROCEDURE — 7100000010 HC PHASE II RECOVERY - FIRST 15 MIN: Performed by: INTERNAL MEDICINE

## 2025-04-03 PROCEDURE — 74174 CTA ABD&PLVS W/CONTRAST: CPT

## 2025-04-03 PROCEDURE — 2720000010 HC SURG SUPPLY STERILE: Performed by: INTERNAL MEDICINE

## 2025-04-03 PROCEDURE — 80053 COMPREHEN METABOLIC PANEL: CPT

## 2025-04-03 PROCEDURE — 86901 BLOOD TYPING SEROLOGIC RH(D): CPT

## 2025-04-03 PROCEDURE — 84484 ASSAY OF TROPONIN QUANT: CPT

## 2025-04-03 PROCEDURE — 86850 RBC ANTIBODY SCREEN: CPT

## 2025-04-03 PROCEDURE — 99223 1ST HOSP IP/OBS HIGH 75: CPT | Performed by: INTERNAL MEDICINE

## 2025-04-03 PROCEDURE — 93005 ELECTROCARDIOGRAM TRACING: CPT | Performed by: STUDENT IN AN ORGANIZED HEALTH CARE EDUCATION/TRAINING PROGRAM

## 2025-04-03 PROCEDURE — 6360000004 HC RX CONTRAST MEDICATION: Performed by: RADIOLOGY

## 2025-04-03 PROCEDURE — 3600007502: Performed by: INTERNAL MEDICINE

## 2025-04-03 PROCEDURE — 99285 EMERGENCY DEPT VISIT HI MDM: CPT

## 2025-04-03 RX ORDER — FERROUS SULFATE 325(65) MG
325 TABLET ORAL 2 TIMES DAILY WITH MEALS
Status: DISCONTINUED | OUTPATIENT
Start: 2025-04-03 | End: 2025-04-05 | Stop reason: HOSPADM

## 2025-04-03 RX ORDER — GABAPENTIN 300 MG/1
300 CAPSULE ORAL 3 TIMES DAILY PRN
Status: DISCONTINUED | OUTPATIENT
Start: 2025-04-03 | End: 2025-04-05 | Stop reason: HOSPADM

## 2025-04-03 RX ORDER — POLYETHYLENE GLYCOL 3350 17 G/17G
17 POWDER, FOR SOLUTION ORAL
Status: DISPENSED | OUTPATIENT
Start: 2025-04-03 | End: 2025-04-03

## 2025-04-03 RX ORDER — ONDANSETRON 2 MG/ML
4 INJECTION INTRAMUSCULAR; INTRAVENOUS EVERY 6 HOURS PRN
Status: DISCONTINUED | OUTPATIENT
Start: 2025-04-03 | End: 2025-04-05 | Stop reason: HOSPADM

## 2025-04-03 RX ORDER — METOPROLOL SUCCINATE 25 MG/1
25 TABLET, EXTENDED RELEASE ORAL DAILY
Status: DISCONTINUED | OUTPATIENT
Start: 2025-04-03 | End: 2025-04-05 | Stop reason: HOSPADM

## 2025-04-03 RX ORDER — VALSARTAN 40 MG/1
80 TABLET ORAL 2 TIMES DAILY
Status: DISCONTINUED | OUTPATIENT
Start: 2025-04-03 | End: 2025-04-05 | Stop reason: HOSPADM

## 2025-04-03 RX ORDER — ASPIRIN 81 MG/1
81 TABLET, CHEWABLE ORAL DAILY
Status: DISCONTINUED | OUTPATIENT
Start: 2025-04-10 | End: 2025-04-05 | Stop reason: HOSPADM

## 2025-04-03 RX ORDER — SODIUM CHLORIDE 9 MG/ML
INJECTION, SOLUTION INTRAVENOUS CONTINUOUS
Status: DISCONTINUED | OUTPATIENT
Start: 2025-04-03 | End: 2025-04-05

## 2025-04-03 RX ORDER — LIDOCAINE HYDROCHLORIDE 20 MG/ML
INJECTION, SOLUTION EPIDURAL; INFILTRATION; INTRACAUDAL; PERINEURAL
Status: DISCONTINUED | OUTPATIENT
Start: 2025-04-03 | End: 2025-04-03 | Stop reason: SDUPTHER

## 2025-04-03 RX ORDER — ATORVASTATIN CALCIUM 20 MG/1
20 TABLET, FILM COATED ORAL DAILY
Status: DISCONTINUED | OUTPATIENT
Start: 2025-04-03 | End: 2025-04-05 | Stop reason: HOSPADM

## 2025-04-03 RX ORDER — IOPAMIDOL 755 MG/ML
100 INJECTION, SOLUTION INTRAVASCULAR
Status: COMPLETED | OUTPATIENT
Start: 2025-04-03 | End: 2025-04-03

## 2025-04-03 RX ORDER — SODIUM CHLORIDE 9 MG/ML
INJECTION, SOLUTION INTRAVENOUS PRN
Status: DISCONTINUED | OUTPATIENT
Start: 2025-04-03 | End: 2025-04-05 | Stop reason: HOSPADM

## 2025-04-03 RX ORDER — OXYBUTYNIN CHLORIDE 5 MG/1
5 TABLET, EXTENDED RELEASE ORAL DAILY
Status: DISCONTINUED | OUTPATIENT
Start: 2025-04-03 | End: 2025-04-05 | Stop reason: HOSPADM

## 2025-04-03 RX ORDER — ONDANSETRON 4 MG/1
4 TABLET, ORALLY DISINTEGRATING ORAL EVERY 8 HOURS PRN
Status: DISCONTINUED | OUTPATIENT
Start: 2025-04-03 | End: 2025-04-05 | Stop reason: HOSPADM

## 2025-04-03 RX ORDER — ACETAMINOPHEN 325 MG/1
650 TABLET ORAL EVERY 6 HOURS PRN
Status: DISCONTINUED | OUTPATIENT
Start: 2025-04-03 | End: 2025-04-05 | Stop reason: HOSPADM

## 2025-04-03 RX ORDER — VENLAFAXINE HYDROCHLORIDE 37.5 MG/1
75 CAPSULE, EXTENDED RELEASE ORAL
Status: DISCONTINUED | OUTPATIENT
Start: 2025-04-04 | End: 2025-04-05 | Stop reason: HOSPADM

## 2025-04-03 RX ORDER — ACETAMINOPHEN 650 MG/1
650 SUPPOSITORY RECTAL EVERY 6 HOURS PRN
Status: DISCONTINUED | OUTPATIENT
Start: 2025-04-03 | End: 2025-04-05 | Stop reason: HOSPADM

## 2025-04-03 RX ORDER — LEVOTHYROXINE SODIUM 75 UG/1
75 TABLET ORAL
Status: DISCONTINUED | OUTPATIENT
Start: 2025-04-04 | End: 2025-04-05 | Stop reason: HOSPADM

## 2025-04-03 RX ORDER — SODIUM CHLORIDE 0.9 % (FLUSH) 0.9 %
5-40 SYRINGE (ML) INJECTION EVERY 12 HOURS SCHEDULED
Status: DISCONTINUED | OUTPATIENT
Start: 2025-04-03 | End: 2025-04-05 | Stop reason: HOSPADM

## 2025-04-03 RX ORDER — LABETALOL HYDROCHLORIDE 5 MG/ML
INJECTION, SOLUTION INTRAVENOUS
Status: DISCONTINUED | OUTPATIENT
Start: 2025-04-03 | End: 2025-04-03 | Stop reason: SDUPTHER

## 2025-04-03 RX ORDER — CLOPIDOGREL BISULFATE 75 MG/1
75 TABLET ORAL DAILY
Status: DISCONTINUED | OUTPATIENT
Start: 2025-04-03 | End: 2025-04-03

## 2025-04-03 RX ORDER — SODIUM CHLORIDE 0.9 % (FLUSH) 0.9 %
5-40 SYRINGE (ML) INJECTION PRN
Status: DISCONTINUED | OUTPATIENT
Start: 2025-04-03 | End: 2025-04-05 | Stop reason: HOSPADM

## 2025-04-03 RX ORDER — OXCARBAZEPINE 150 MG/1
300 TABLET, FILM COATED ORAL 2 TIMES DAILY
Status: DISCONTINUED | OUTPATIENT
Start: 2025-04-03 | End: 2025-04-05 | Stop reason: HOSPADM

## 2025-04-03 RX ADMIN — PROPOFOL 20 MG: 10 INJECTION, EMULSION INTRAVENOUS at 15:56

## 2025-04-03 RX ADMIN — SODIUM CHLORIDE: 0.9 INJECTION, SOLUTION INTRAVENOUS at 17:28

## 2025-04-03 RX ADMIN — VALSARTAN 80 MG: 160 TABLET, FILM COATED ORAL at 11:49

## 2025-04-03 RX ADMIN — PROPOFOL 50 MG: 10 INJECTION, EMULSION INTRAVENOUS at 15:53

## 2025-04-03 RX ADMIN — OXCARBAZEPINE 300 MG: 150 TABLET, FILM COATED ORAL at 21:10

## 2025-04-03 RX ADMIN — LABETALOL HYDROCHLORIDE 5 MG: 5 INJECTION INTRAVENOUS at 15:46

## 2025-04-03 RX ADMIN — PROPOFOL 100 MG: 10 INJECTION, EMULSION INTRAVENOUS at 15:48

## 2025-04-03 RX ADMIN — LIDOCAINE HYDROCHLORIDE 40 MG: 20 INJECTION, SOLUTION EPIDURAL; INFILTRATION; INTRACAUDAL; PERINEURAL at 15:48

## 2025-04-03 RX ADMIN — METOPROLOL SUCCINATE 25 MG: 25 TABLET, EXTENDED RELEASE ORAL at 11:49

## 2025-04-03 RX ADMIN — VALSARTAN 80 MG: 160 TABLET, FILM COATED ORAL at 21:10

## 2025-04-03 RX ADMIN — SODIUM CHLORIDE, PRESERVATIVE FREE 10 ML: 5 INJECTION INTRAVENOUS at 21:11

## 2025-04-03 RX ADMIN — ATORVASTATIN CALCIUM 20 MG: 20 TABLET, FILM COATED ORAL at 11:49

## 2025-04-03 RX ADMIN — IOPAMIDOL 100 ML: 755 INJECTION, SOLUTION INTRAVENOUS at 09:17

## 2025-04-03 RX ADMIN — OXCARBAZEPINE 300 MG: 150 TABLET, FILM COATED ORAL at 11:49

## 2025-04-03 RX ADMIN — FERROUS SULFATE TAB 325 MG (65 MG ELEMENTAL FE) 325 MG: 325 (65 FE) TAB at 18:03

## 2025-04-03 RX ADMIN — OXYBUTYNIN CHLORIDE 5 MG: 5 TABLET, EXTENDED RELEASE ORAL at 11:49

## 2025-04-03 RX ADMIN — PROPOFOL 30 MG: 10 INJECTION, EMULSION INTRAVENOUS at 15:55

## 2025-04-03 RX ADMIN — FERROUS SULFATE TAB 325 MG (65 MG ELEMENTAL FE) 325 MG: 325 (65 FE) TAB at 11:49

## 2025-04-03 RX ADMIN — PROPOFOL 50 MG: 10 INJECTION, EMULSION INTRAVENOUS at 15:50

## 2025-04-03 RX ADMIN — SODIUM CHLORIDE, PRESERVATIVE FREE 10 ML: 5 INJECTION INTRAVENOUS at 12:05

## 2025-04-03 ASSESSMENT — PAIN DESCRIPTION - ORIENTATION: ORIENTATION: OTHER (COMMENT)

## 2025-04-03 ASSESSMENT — ENCOUNTER SYMPTOMS
ABDOMINAL PAIN: 1
SHORTNESS OF BREATH: 0
ANAL BLEEDING: 1

## 2025-04-03 ASSESSMENT — PAIN DESCRIPTION - DESCRIPTORS: DESCRIPTORS: ACHING

## 2025-04-03 ASSESSMENT — PAIN DESCRIPTION - FREQUENCY: FREQUENCY: CONTINUOUS

## 2025-04-03 ASSESSMENT — PAIN DESCRIPTION - ONSET: ONSET: ON-GOING

## 2025-04-03 ASSESSMENT — PAIN SCALES - GENERAL
PAINLEVEL_OUTOF10: 5
PAINLEVEL_OUTOF10: 0

## 2025-04-03 ASSESSMENT — PAIN - FUNCTIONAL ASSESSMENT
PAIN_FUNCTIONAL_ASSESSMENT: 0-10
PAIN_FUNCTIONAL_ASSESSMENT: PREVENTS OR INTERFERES SOME ACTIVE ACTIVITIES AND ADLS

## 2025-04-03 ASSESSMENT — PAIN DESCRIPTION - LOCATION: LOCATION: ABDOMEN

## 2025-04-03 NOTE — ED PROVIDER NOTES
[facility] EMERGENCY DEPARTMENT  EMERGENCY DEPARTMENT ENCOUNTER      Pt Name: Darshana Kraus  MRN: [id number]  Birthdate 1950  Date of evaluation: 4/3/2025  Provider: Jong Cortes DO    CHIEF COMPLAINT       Chief Complaint   Patient presents with    Rectal Bleeding         HISTORY OF PRESENT ILLNESS   (Location/Symptom, Timing/Onset, Context/Setting, Quality, Duration, Modifying Factors, Severity)  Note limiting factors.   This is a 74-year-old female who presents ED for evaluation of GI bleed dark tarry stools with ongoing bright red blood per rectum, recent hospitalization for similar.  Is reporting some abdominal pain and lightheadedness.  Also endorsing some weakness.  No vomiting.            Review of External Medical Records:     Nursing Notes were reviewed.    REVIEW OF SYSTEMS    (2-9 systems for level 4, 10 or more for level 5)     Review of Systems   Constitutional:  Positive for fatigue. Negative for fever.   Respiratory:  Negative for shortness of breath.    Cardiovascular:  Negative for chest pain.   Gastrointestinal:  Positive for abdominal pain and anal bleeding.       Except as noted above the remainder of the review of systems was reviewed and negative.       PAST MEDICAL HISTORY     Past Medical History:   Diagnosis Date    Asthma     Atrial fibrillation s/p Watchman     Bipolar 1 disorder (HCC)     Cardiac pacemaker     Essential hypertension     Pneumonia          SURGICAL HISTORY       Past Surgical History:   Procedure Laterality Date    CARDIAC PROCEDURE N/A 1/31/2025    Sunil during cath case performed by Quoc Olguin MD at [facility] CARDIAC CATH LAB    CARDIAC PROCEDURE N/A 1/31/2025    Intracardiac echocardiogram performed by Quoc Olguin MD at [facility] CARDIAC CATH LAB    COLONOSCOPY N/A 3/20/2025    COLONOSCOPY DIAGNOSTIC performed by Rod Li MD at [facility] ENDOSCOPY    EP DEVICE PROCEDURE N/A 1/31/2025    Ablation A-fib w complete ep study performed by Quoc Olguin MD at  today.  CTA of the abdomen pelvis with and without IV contrast remarkable for large volumes of stool throughout the length of the colon no active bleeding, superior mesenteric artery ostial stenosis.  Requires further evaluation for the above.     Total critical care time spent exclusive of procedures:  30 minutes.         CONSULTS:  IP CONSULT TO HOSPITALIST    PROCEDURES:  Unless otherwise noted below, none     Procedures      FINAL IMPRESSION      1. Gastrointestinal hemorrhage, unspecified gastrointestinal hemorrhage type    2. Anemia, unspecified type          DISPOSITION/PLAN   DISPOSITION Decision To Admit 04/03/2025 10:19:25 AM      PATIENT REFERRED TO:  No follow-up provider specified.    DISCHARGE MEDICATIONS:  New Prescriptions    No medications on file         (Please note that portions of this note were completed with a voice recognition program.  Efforts were made to edit the dictations but occasionally words are mis-transcribed.)    Jong Cortes DO (electronically signed)  Emergency Attending Physician / Physician Assistant / Nurse Practitioner              Jong Cortes DO  04/03/25 1024

## 2025-04-03 NOTE — PROGRESS NOTES
Initial RN admission and assessment performed and documented in Endoscopy navigator.     Patient evaluated by anesthesia in pre-procedure holding.     All procedural vital signs, airway assessment, and level of consciousness information monitored and recorded by anesthesia staff on the anesthesia record.     Report received from CRNA post procedure.  Patient transported to recovery area by RN.    Endoscopy post procedure time out was performed and specimens were verified by physician.-No specimens    Endoscope was pre-cleaned at bedside immediately following procedure by Abbie Worthy RN.    TRANSFER - OUT REPORT:    Verbal report given to Kristyn STRONG on Darshana Pack  being transferred to [facility] for routine post-op       Report consisted of patient's Situation, Background, Assessment and   Recommendations(SBAR).     Information from the following report(s) Nurse Handoff Report and Surgery Report was reviewed with the receiving nurse.           Lines:   Peripheral IV 04/03/25 Right Antecubital (Active)   Site Assessment Clean, dry & intact 04/03/25 0828   Line Status Brisk blood return;Specimen collected;Normal saline locked;Flushed 04/03/25 0828   Line Care Connections checked and tightened 04/03/25 0828   Phlebitis Assessment No symptoms 04/03/25 0828   Infiltration Assessment 0 04/03/25 0828   Dressing Status New dressing applied;Clean, dry & intact 04/03/25 0828   Dressing Type Transparent 04/03/25 0828   Dressing Intervention New 04/03/25 0828        Opportunity for questions and clarification was provided.      Patient transported with:  Tech

## 2025-04-03 NOTE — ED NOTES
0915 pt off of unit to CT    0930 pt back from CT    0935 purewick in place and pt changed into brief

## 2025-04-03 NOTE — CARE COORDINATION
Care Management Initial Assessment       RUR: 19%  Readmission? Yes  1st IM letter given? Yes   1st  letter given: No     04/03/25 1348   Service Assessment   Patient Orientation Alert and Oriented   Cognition Alert   History Provided By Patient   Primary Caregiver Self   Support Systems Spouse/Significant Other;Children   Patient's Healthcare Decision Maker is: Legal Next of Kin   PCP Verified by CM Yes   Last Visit to PCP Within last 6 months   Prior Functional Level Independent in ADLs/IADLs   Can patient return to prior living arrangement Yes   Ability to make needs known: Good   Financial Resources Medicare   Social/Functional History   Lives With Significant other   Type of Home House   Home Access Level entry   Bathroom Shower/Tub Walk-in shower  (w/ step threshhold)   Bathroom Equipment None   Prior Level of Assist for ADLs Independent   Ambulation Assistance Independent   Prior Level of Assist for Transfers Independent   Active  Yes   Mode of Transportation Car   Occupation Retired   Discharge Planning   History of falls? 1     EMR reviewed. Patient presents to the hospital w/ black stools and rectal bleeding.  Met w/patient and introduced to role of CM. History provided - Ms. Kraus has resided w/ her Boyfriend for 14 years and has been using in-law suite for same time. Noted readmission patient acknowledges just locating Discharge paperwork regarding follow up  however did not set up appointment.     PCP last appointment 6 months ago.     [facility] is local pharmacy utilized.      No Needs verbalized at this time.      [employer] is insurance provider.

## 2025-04-03 NOTE — CONSULTS
[facility]    [address] [address]        GASTROENTEROLOGY CONSULTATION NOTE  Lorenza Teixeira PA-C  [phone] office  NP/PA in-hospital M-F until 4:30PM  After 5PM or on weekends, please call  for physician on call        NAME:  Darshana Kraus   :   1950   MRN:   [id number]       Referring Physician: Ratna Su    Consult Date: 4/3/2025 12:05 PM    Chief Complaint: Recurrent GI bleed     History of Present Illness:  Patient is a 74 y.o. who is seen in consultation at the request of Ratna Su for recurrent GI bleed. Past medical history significant for afib on plavix s/p Watchman 2025, bipolar 1 disorder, and HTN. She was recently admitted from 3/19/25 to 3/21/25 for acute blood loss anemia secondary to GI hemorrhage. She returned back to the ER with complaints of persistent melena and hematochezia. Patient reports ongoing dark tarry stools and bright red rectal bleeding since hospital discharge. No diarrhea or constipation. Reports suprapubic/lower abdominal pain that has been associated with urinary frequency. No fevers or chills. No nausea or vomiting. Tolerating regular diet at home. Reports occasional dizziness and fatigue.     Patient is on Plavix and aspirin with last dose this morning (4/3). No NSAID intake, alcohol or tobacco use.     EGD (3/20/2025) with Dr. Li showed 2 cm hiatal hernia, otherwise normal. Colonoscopy at that time showed mild sigmoid diverticulosis and 1 cm sessile polyp in the sigmoid colon. 1 cm AVM, oozing, cauterized with APC. Path showed tubular adenoma.     I have reviewed the emergency room note, hospital admission note, notes by all other clinicians who have seen the patient during this hospitalization to date. I have reviewed the problem list and the reason for this hospitalization. I have reviewed the allergies and the medications the patient was taking at home prior to this hospitalization.    PMH:  Past Medical  abd/pelvis (4/3/25)    IMPRESSION:     1. No active bleeding.  2. Large volume of stool throughout the length of the colon.  3. Superior mesenteric artery ostial stenosis.       Assessment:     GI bleed: Presented with persistent melena and hematochezia s/p EGD and colonoscopy on 3/20 as above during recent hospital admission. Hgb 8.5. Platelets 321. BUN/Cr 20. CT negative for active GI bleeding. Differentials include avms vs hemorrhoids vs diverticulosis.   Afib s/p ablation and Watchman 1/2025: On Plavix and aspirin, held on admission     Patient Active Problem List   Diagnosis    Hypoxia    PAF (paroxysmal atrial fibrillation) (HCC)    Primary hypertension    Dilated cardiomyopathy (HCC)    Bipolar 1 disorder (HCC)    A-fib (HCC)    Paroxysmal atrial fibrillation (HCC)    Sick sinus syndrome (HCC)    Presence of Watchman left atrial appendage closure device    Acute blood loss anemia    Gastrointestinal hemorrhage    GIB (gastrointestinal bleeding)     Plan:     NPO  Agree with BID PPI  Supportive measures   Trend CBC and transfuse as necessary  If pt begins to bleed briskly, order CTA GI bleed protocol and immediately consult IR for intervention if the CTA is positive  Continue to hold Plavix and aspirin as able  Plans for push enteroscopy today (4/3). Risks of procedure reviewed with patient including but not limited to anesthesia, bleeding, infection and perforation. Patient understands and agrees to proceed.   Patient was discussed with Dr. Lucas  Thank you for allowing me to participate in care of Darshana Kraus       Signed By: Lorenza eTixeira PA-C     4/3/2025  12:05 PM

## 2025-04-03 NOTE — INTERVAL H&P NOTE
Update History & Physical    See consult note with addendum from today    Electronically signed by Stefan Lucas MD on 4/3/2025 at 3:44 PM

## 2025-04-03 NOTE — OP NOTE
[facility]   [place], [place] [address]                Endoscopic Gastroduodenoscopy Procedure Note    Darshana Pack  1950  [id number]    Indication: Iron deficiency anemia, Melena/hematochezia     : Stefan Lucas MD    Assistants: None    Referring Provider:  Zaid Conti DO    Anesthesia/Sedation:  MAC anesthesia Propofol      Airway assessment: No airway problems anticipated    Pre-Procedural Exam:  Airway: clear, no airway problems anticipated  Heart: RRR, without gallops or rubs  Lungs: clear bilaterally without wheezes, crackles, or rhonchi  Abdomen: soft, nontender, nondistended, bowel sounds present  Mental Status: awake, alert and oriented to person, place and time        Procedure Details   After infomed consent was obtained for the procedure, with all risks and benefits of procedure explained the patient was taken to the endoscopy suite and placed in the left lateral decubitus position.  Following sequential administration of sedation as per above, the endoscope was inserted into the mouth and advanced under direct vision to second portion of the duodenum.  A careful inspection was made as the gastroscope was withdrawn, including a retroflexed view of the proximal stomach; findings and interventions are described below.      Findings:   Esophagus:normal  Stomach: Mild nonerosive gastritis with scar in the prepyloric area suggestive of a healed gastritis or remote ulcer.  No biopsies taken as patient is on plavix                   Small sliding hiatal hernia                   Otherwise normal stomach  Duodenum/jejunum: normal mucosa scope advanced into the proximal jejunum.  No AVMs or evidence of active bleeding seen    Therapies:  none    Specimens: none    Implants: None           Complications:   None; patient tolerated the procedure well.    EBL:  None.           Impression:   Negative upper GI for active bleeding                         Mild  nonerosive antral gastritis    Recommendations:  -Clear liquids  -Miralax x 2 tonight  -M2A capsule endoscopy tomorrow  -Plavix management per cardiology  -further recommendations after procedure    Stefan Lucas MD  4/3/2025  4:17 PM

## 2025-04-03 NOTE — H&P
History and Physical    Date of Service:  4/3/2025  Primary Care Provider: Zaid Conti DO  Source of information: The patient and Chart review    Chief Complaint: Rectal Bleeding      History of Presenting Illness:   Darshana Kraus is a 74 y.o. female with a past medical history significant for A-fib on AC therapy with Coumadin reportedly status post Watchman procedure, PPM, bipolar, hypertension, diabetes, hyperlipidemia, hypothyroidism who presents with persistent GI bleeding.  Patient states she was just discharged from the hospital on 3/21/25 and since that period of time her stools have remained tarry and bloody and she has had some lower abdominal pain.  Denies any nausea or vomiting but does endorse some dizziness.  She has not had any outpatient follow-up and has not contacted the physicians that she saw here as she misplaced her paperwork.  She notes that her stools have been very dark in color and tarry in appearance but that she also has some bright red blood mixed in.  On review of the chart she underwent colonoscopy/EGD on her last admission with findings of a 1 cm AVM that was cauterized and a 2 cm hiatal hernia.  She states she has been taking her medications as directed.    In the ER a CTA of her abdomen demonstrated no active bleeding, a large volume of stool throughout the colon and superior mesenteric artery ostial stenosis.  Her hemoglobin today is 8.5 which is down from her discharge hemoglobin of 9.4.  Her vital signs are stable with her pressure actually being mildly elevated.     The patient denies any headache, blurry vision, sore throat, trouble swallowing, trouble with speech, chest pain, SOB, cough, fever, chills, N/V/D,  urinary symptoms, constipation, recent travels, sick contacts, focal or generalized neurological symptoms, falls, injuries, rashes, contact with COVID-19 diagnosed patients, hematemesis, hemoptysis, hematuria, rashes, denies starting any new medications  Tobacco Use: Never     Passive Exposure: Not on file   Alcohol Use: Not At Risk (3/15/2024)    AUDIT-C     Frequency of Alcohol Consumption: Never     Average Number of Drinks: Patient does not drink     Frequency of Binge Drinking: Never   Financial Resource Strain: Not on file   Food Insecurity: No Food Insecurity (3/18/2025)    Hunger Vital Sign     Worried About Running Out of Food in the Last Year: Never true     Ran Out of Food in the Last Year: Never true   Transportation Needs: Patient Declined (3/18/2025)    PRAPARE - Transportation     Lack of Transportation (Medical): Patient declined     Lack of Transportation (Non-Medical): Patient declined   Physical Activity: Not on file   Stress: Not on file   Social Connections: Not on file   Intimate Partner Violence: Not At Risk (8/17/2024)    Received from [facility]    Humiliation, Afraid, Rape, and Kick questionnaire     Fear of Current or Ex-Partner: No     Emotionally Abused: No     Physically Abused: No     Sexually Abused: No   Depression: Not at risk (11/26/2024)    PHQ-2     PHQ-2 Score: 0   Housing Stability: Patient Declined (3/18/2025)    Housing Stability Vital Sign     Unable to Pay for Housing in the Last Year: Patient declined     Number of Times Moved in the Last Year: 0     Homeless in the Last Year: Patient declined   Interpersonal Safety: Not At Risk (3/20/2025)    Interpersonal Safety Domain Source: IP Abuse Screening     Physical abuse: Denies     Verbal abuse: Denies     Emotional abuse: Denies     Financial abuse: Denies     Sexual abuse: Denies   Utilities: Patient Declined (3/18/2025)    [facility] Utilities     Threatened with loss of utilities: Patient declined        Medications were reconciled to the best of my ability given all available resources at the time of admission. Route is PO if not otherwise noted.     Family and social history were personally reviewed, all pertinent and relevant details are outlined as

## 2025-04-03 NOTE — CONSENT
Informed Consent for Blood Component Transfusion Note    I have discussed with the patient the rationale for blood component transfusion; its benefits in treating or preventing fatigue, organ damage, or death; and its risk which includes mild transfusion reactions, rare risk of blood borne infection, or more serious but rare reactions. I have discussed the alternatives to transfusion, including the risk and consequences of not receiving transfusion. The patient had an opportunity to ask questions and had agreed to proceed with transfusion of blood components.    Electronically signed by DEANDRA Eli CNP on 4/3/25 at 3:06 PM EDT

## 2025-04-03 NOTE — ANESTHESIA PRE PROCEDURE
Department of Anesthesiology  Preprocedure Note       Name:  Darshana Kraus   Age:  74 y.o.  :  1950                                          MRN:  [id number]         Date:  4/3/2025      Surgeon: Surgeon(s):  Stefan Lucas MD    Procedure: Procedure(s):  ESOPHAGOGASTRODUODENOSCOPY    Medications prior to admission:   Prior to Admission medications    Medication Sig Start Date End Date Taking? Authorizing Provider   metoprolol succinate (TOPROL XL) 25 MG extended release tablet Take 1 tablet by mouth daily 3/21/25  Yes Madala, Sushma, MD   ferrous sulfate (IRON 325) 325 (65 Fe) MG tablet Take 1 tablet by mouth 2 times daily (with meals) 3/21/25  Yes Madala, Sushma, MD   clopidogrel (PLAVIX) 75 MG tablet Take 1 tablet by mouth daily 3/21/25  Yes Madala, Sushma, MD   hydroCHLOROthiazide 12.5 MG tablet Take 1 tablet by mouth daily   Yes Dakota Gee MD   aspirin 81 MG chewable tablet Take 1 tablet by mouth daily 4/10/25   Madala, Sushma, MD   valsartan (DIOVAN) 80 MG tablet Take 1 tablet by mouth 2 times daily    Dakota Gee MD   metFORMIN (GLUCOPHAGE-XR) 500 MG extended release tablet Take 1 tablet by mouth 2 times daily (with meals)    Dakota Gee MD   venlafaxine (EFFEXOR XR) 75 MG extended release capsule  24   Dakota Gee MD   gabapentin (NEURONTIN) 300 MG capsule Take 1 capsule by mouth 3 times daily as needed.    Dakota Gee MD   acetaminophen (TYLENOL 8 HOUR ARTHRITIS PAIN) 650 MG extended release tablet Take 1 tablet by mouth every 8 hours as needed for Pain    ProviderDakota MD   atorvastatin (LIPITOR) 20 MG tablet Take 1 tablet by mouth daily    Automatic Reconciliation, Ar   levothyroxine (SYNTHROID) 75 MCG tablet Take 1 tablet by mouth every morning (before breakfast)    Automatic Reconciliation, Ar   OXcarbazepine (TRILEPTAL) 300 MG tablet Take 1 tablet by mouth 2 times daily    Automatic Reconciliation, Ar   oxybutynin (DITROPAN-XL) 5 MG

## 2025-04-03 NOTE — CONSULTS
[facility] CARDIOLOGY    AURORA Addendum    I have seen, interviewed, and examined the patient.  I agree with the history, exam, and was involved in the formulation of the assessment and plan as detailed in the Cardiology/Cardiac Electrophysiology consultation documentation composed today by the Advance Practice Provider (AURORA, NP, PA). Please see the AURORA’s note for full details, below includes any additional revisions. I have performed the exam and medical decision making portions in its entirety.     Physical exam:  Visit Vitals  BP (!) 131/114   Pulse 67   Temp 98 °F (36.7 °C) (Oral)   Resp 20   Ht 1.727 m (5' 8\")   Wt 86.2 kg (190 lb 0.6 oz)   SpO2 96%   BMI 28.89 kg/m²         GENERAL: No acute distress  HEAD: AT/NC  HEENT: Sclerae anicteric, ocular muscles intact.  CARDIOVASCULAR: Regular rate and rhythm, normal S1/S2, no murmurs/rubs/gallops  RESPIRATORY: clear to auscultation bilaterally, without wheezes/rales/rhonchi  ABDOMINAL: soft, non-tender, non-distended, positive bowel sounds  EXTREMITIES: warm, well perfused, no lower extremities edema, no cyanosis.   NEURO: alert, oriented, answering questions appropriately, no focal neurologic deficits  PSY: normal mood    Data: Labs, ECG, telemetry personally reviewed and interpreted    Current active problems:   Recurrent GI Bleed  Persistent atrial fibrillation status post concurrent watchman implantation and A-fib ablation with Dr. Olguin on 1/31/2025  Anticoagulation management  Hypertension  Hyperlipidemia    Assessment and Plan:   Cardiac electrophysiology was consulted regarding recurrent GI bleed.  Patient presented with rectal bleeding and dark stools.  She was recently admitted with GI bleed was found to have cecal AVM status post cautery on 3/20/2025.  - Status post watchman implantation on 1/31/2025 with post watchman LALO on 3/21/2025 showing no thrombus or leak  - Given evidence of recurrent GI bleed at this point I would hold Plavix and resume  view    FINDINGS: Lungs are clear. Heart size is normal. No pleural effusion or  pneumothorax. Watchman device projects over the left atrial appendage.    Impression  No acute cardiopulmonary abnormality.    Electronically signed by CHALINO HERNANDEZ      CT Result (most recent):  CTA ABDOMEN PELVIS W WO CONTRAST 04/03/2025    Narrative  CLINICAL HISTORY: GI bleed    COMPARISON: None    TECHNIQUE: CT of the abdomen and pelvis with and without IV contrast , 100 cc  Isovue 3 7 is performed. Axial images from the abdomen to the level of the upper  thighs is obtained. Manual post-processing of the images and coronal  reformatting is also performed. Standard dose modulation was utilized to reduce  the overall radiation dose administered to the patient.  Multiplanar reformatted  imaging was performed.  Sagittal and coronal reformatting.  CT dose reduction  was achieved through use of a standardized protocol tailored for this  examination and automatic exposure control for dose modulation.    MIP reconstructed images were obtained in the coronal and sagittal plane.    FINDINGS:  Vasculature:  Normal abdominal aorta with minimal atherosclerotic disease, no aneurysm or  dissection. Bilateral iliac trunks are widely patent.    Stenosis at the origin of the superior mesenteric artery. The celiac artery is  widely patent. The inferior mesenteric artery is widely patent.    Moderate disease at the origin of the bilateral renal arteries without stenosis.    Lower chest:  Visualized part of the lungs are clear. No nodules or masses. No pericardial  effusion or pneumothorax. The distal esophagus is unremarkable.    Abdomen/Pelvis:  Noncirrhotic liver morphology. Normal gallbladder.    There are no focal abnormalities within the  spleen, pancreas, adrenal glands or  kidneys.    Stomach, duodenum, and small bowel are unremarkable.  Large volume of stool  throughout the length of the colon the colon is otherwise unremarkable.    No

## 2025-04-03 NOTE — PERIOP NOTE
TRANSFER - IN REPORT:    Verbal report received from debi Villa on Darshana Pack  being received from ED for ordered procedure      Report consisted of patient's Situation, Background, Assessment and   Recommendations(SBAR).     Information from the following report(s) Intake/Output, MAR, and Procedure Verification was reviewed with the receiving nurse.    Opportunity for questions and clarification was provided.      Assessment completed upon patient's arrival to unit and care assumed.

## 2025-04-03 NOTE — ED TRIAGE NOTES
Pt ambulatory to ED w/ c/o black stools and rectal bleeding. Pt states she was seen 2 weeks ago, had an endoscopy/colonoscopy but states bleeding has never stopped. Pt c/o bright red blood on toilet paper and dark black stools. +abd pain, intermittent dizziness, slight burning with urination. Denies GI follow up. Denies n/v/d, chest pain, SOB. Denies hx of abd surgeries.     +blood thinner

## 2025-04-03 NOTE — H&P (VIEW-ONLY)
[facility]    [address] [address]        GASTROENTEROLOGY CONSULTATION NOTE  Lorenza Teixeira PA-C  [phone] office  NP/PA in-hospital M-F until 4:30PM  After 5PM or on weekends, please call  for physician on call        NAME:  Darshana Kraus   :   1950   MRN:   [id number]       Referring Physician: Ratna Su    Consult Date: 4/3/2025 12:05 PM    Chief Complaint: Recurrent GI bleed     History of Present Illness:  Patient is a 74 y.o. who is seen in consultation at the request of Ratna Su for recurrent GI bleed. Past medical history significant for afib on plavix s/p Watchman 2025, bipolar 1 disorder, and HTN. She was recently admitted from 3/19/25 to 3/21/25 for acute blood loss anemia secondary to GI hemorrhage. She returned back to the ER with complaints of persistent melena and hematochezia. Patient reports ongoing dark tarry stools and bright red rectal bleeding since hospital discharge. No diarrhea or constipation. Reports suprapubic/lower abdominal pain that has been associated with urinary frequency. No fevers or chills. No nausea or vomiting. Tolerating regular diet at home. Reports occasional dizziness and fatigue.     Patient is on Plavix and aspirin with last dose this morning (4/3). No NSAID intake, alcohol or tobacco use.     EGD (3/20/2025) with Dr. Li showed 2 cm hiatal hernia, otherwise normal. Colonoscopy at that time showed mild sigmoid diverticulosis and 1 cm sessile polyp in the sigmoid colon. 1 cm AVM, oozing, cauterized with APC. Path showed tubular adenoma.     I have reviewed the emergency room note, hospital admission note, notes by all other clinicians who have seen the patient during this hospitalization to date. I have reviewed the problem list and the reason for this hospitalization. I have reviewed the allergies and the medications the patient was taking at home prior to this hospitalization.    PMH:  Past Medical

## 2025-04-03 NOTE — ED NOTES
8:01 AM  I have evaluated the patient as the Provider in Rapid Medical Evaluation (RME). I have reviewed her vital signs and the triage nurse assessment. I have talked with the patient and any available family and advised that I am the provider in triage and have ordered the appropriate study to initiate their work up based on the clinical presentation during my assessment. I have advised that the patient will be accommodated in the Main ED as soon as possible. I have also requested to contact the triage nurse or myself immediately if the patient experiences any changes in their condition during this brief waiting period.  ITZ Martínez    Darshana Kraus is a 74 y.o. female PMH of A fib on blood thinners, alcohol abuse, who presents to the emergency department complaining of ongoing bright red blood per rectum and black tarry stools.  Reports being admitted to the hospital from March 18 to March 21 where he had a colonoscopy endoscopy done.  Upon chart review had AVM cauterization done however patient reports symptoms have never fully resolved and is still having umbilical abdominal pain and intermittent lightheadedness.  Denies any chest pain, shortness of breath, nausea, vomiting.  Reports having some dysuria but denies any other urinary symptoms.  Reports current pain level is 5 out of 10.         Alcira Clark PA  04/03/25 0819

## 2025-04-03 NOTE — CARE COORDINATION
04/03/25 1409   Readmission Assessment   Number of Days since last admission? 8-30 days   Previous Disposition Home with Home Health  (patient states has not had HH since before hospital admission.. Has not heard from provider since returnig back home.)   Who is being Interviewed Patient   What was the patient's/caregiver's perception as to why they think they needed to return back to the hospital? Other (Comment)  (medical issue from previous hospitalization started again)   Did you visit your Primary Care Physician after you left the hospital, before you returned this time? No   Why weren't you able to visit your PCP? Other (Comment)  (wasnt aware needed appointment)   Did you see a specialist, such as Cardiac, Pulmonary, Orthopedic Physician, etc. after you left the hospital? No   Who advised the patient to return to the hospital? Caregiver;Self-referral   Does the patient report anything that got in the way of taking their medications? No   In our efforts to provide the best possible care to you and others like you, can you think of anything that we could have done to help you after you left the hospital the first time, so that you might not have needed to return so soon? Other (Comment)  (patient thought caterization was successful but medical issue returned)     Call placed to [facility] / spoke w/ Mauro , Patient Care Coordinator [phone] regarding referral. Updates received HH assessment was on 3/26 with resumption 3/29. New episode due and order fax requested from Dr. Bright Reyes ([facility]) on 4/1 agency is awaiting orders. CM informed patient is admitted back to [facility] will need resumption orders when medically stable.

## 2025-04-04 LAB
ANION GAP SERPL CALC-SCNC: 4 MMOL/L (ref 2–12)
APTT PPP: 27.4 SEC (ref 22.1–31)
BUN SERPL-MCNC: 10 MG/DL (ref 6–20)
BUN/CREAT SERPL: 18 (ref 12–20)
CALCIUM SERPL-MCNC: 9.3 MG/DL (ref 8.5–10.1)
CHLORIDE SERPL-SCNC: 103 MMOL/L (ref 97–108)
CO2 SERPL-SCNC: 27 MMOL/L (ref 21–32)
CREAT SERPL-MCNC: 0.55 MG/DL (ref 0.55–1.02)
EKG ATRIAL RATE: 77 BPM
EKG DIAGNOSIS: NORMAL
EKG P AXIS: 45 DEGREES
EKG P-R INTERVAL: 188 MS
EKG Q-T INTERVAL: 378 MS
EKG QRS DURATION: 88 MS
EKG QTC CALCULATION (BAZETT): 427 MS
EKG R AXIS: 57 DEGREES
EKG T AXIS: 62 DEGREES
EKG VENTRICULAR RATE: 77 BPM
ERYTHROCYTE [DISTWIDTH] IN BLOOD BY AUTOMATED COUNT: 16.1 % (ref 11.5–14.5)
GLUCOSE SERPL-MCNC: 88 MG/DL (ref 65–100)
HCT VFR BLD AUTO: 26.5 % (ref 35–47)
HGB BLD-MCNC: 8.7 G/DL (ref 11.5–16)
INR PPP: 1.2 (ref 0.9–1.1)
IRON SATN MFR SERPL: 26 % (ref 20–50)
IRON SERPL-MCNC: 106 UG/DL (ref 35–150)
MCH RBC QN AUTO: 30.7 PG (ref 26–34)
MCHC RBC AUTO-ENTMCNC: 32.8 G/DL (ref 30–36.5)
MCV RBC AUTO: 93.6 FL (ref 80–99)
NRBC # BLD: 0 K/UL (ref 0–0.01)
NRBC BLD-RTO: 0 PER 100 WBC
PLATELET # BLD AUTO: 346 K/UL (ref 150–400)
PMV BLD AUTO: 9.4 FL (ref 8.9–12.9)
POTASSIUM SERPL-SCNC: 3.3 MMOL/L (ref 3.5–5.1)
PROTHROMBIN TIME: 12.5 SEC (ref 9.2–11.2)
RBC # BLD AUTO: 2.83 M/UL (ref 3.8–5.2)
SODIUM SERPL-SCNC: 134 MMOL/L (ref 136–145)
THERAPEUTIC RANGE: NORMAL SECS (ref 58–77)
TIBC SERPL-MCNC: 407 UG/DL (ref 250–450)
WBC # BLD AUTO: 4.9 K/UL (ref 3.6–11)

## 2025-04-04 PROCEDURE — 85027 COMPLETE CBC AUTOMATED: CPT

## 2025-04-04 PROCEDURE — 93010 ELECTROCARDIOGRAM REPORT: CPT | Performed by: INTERNAL MEDICINE

## 2025-04-04 PROCEDURE — 2060000000 HC ICU INTERMEDIATE R&B

## 2025-04-04 PROCEDURE — 80048 BASIC METABOLIC PNL TOTAL CA: CPT

## 2025-04-04 PROCEDURE — 2580000003 HC RX 258: Performed by: NURSE PRACTITIONER

## 2025-04-04 PROCEDURE — 83550 IRON BINDING TEST: CPT

## 2025-04-04 PROCEDURE — 6370000000 HC RX 637 (ALT 250 FOR IP): Performed by: NURSE PRACTITIONER

## 2025-04-04 PROCEDURE — 85730 THROMBOPLASTIN TIME PARTIAL: CPT

## 2025-04-04 PROCEDURE — 85610 PROTHROMBIN TIME: CPT

## 2025-04-04 PROCEDURE — 83540 ASSAY OF IRON: CPT

## 2025-04-04 RX ADMIN — VALSARTAN 80 MG: 160 TABLET, FILM COATED ORAL at 20:16

## 2025-04-04 RX ADMIN — OXYBUTYNIN CHLORIDE 5 MG: 5 TABLET, EXTENDED RELEASE ORAL at 09:07

## 2025-04-04 RX ADMIN — SODIUM CHLORIDE: 0.9 INJECTION, SOLUTION INTRAVENOUS at 16:16

## 2025-04-04 RX ADMIN — METOPROLOL SUCCINATE 25 MG: 25 TABLET, EXTENDED RELEASE ORAL at 09:07

## 2025-04-04 RX ADMIN — OXCARBAZEPINE 300 MG: 150 TABLET, FILM COATED ORAL at 20:16

## 2025-04-04 RX ADMIN — SODIUM CHLORIDE: 0.9 INJECTION, SOLUTION INTRAVENOUS at 04:47

## 2025-04-04 RX ADMIN — LEVOTHYROXINE SODIUM 75 MCG: 0.07 TABLET ORAL at 06:28

## 2025-04-04 RX ADMIN — FERROUS SULFATE TAB 325 MG (65 MG ELEMENTAL FE) 325 MG: 325 (65 FE) TAB at 09:07

## 2025-04-04 RX ADMIN — OXCARBAZEPINE 300 MG: 150 TABLET, FILM COATED ORAL at 09:07

## 2025-04-04 RX ADMIN — ATORVASTATIN CALCIUM 20 MG: 20 TABLET, FILM COATED ORAL at 09:07

## 2025-04-04 RX ADMIN — VENLAFAXINE HYDROCHLORIDE 75 MG: 37.5 CAPSULE, EXTENDED RELEASE ORAL at 09:07

## 2025-04-04 RX ADMIN — FERROUS SULFATE TAB 325 MG (65 MG ELEMENTAL FE) 325 MG: 325 (65 FE) TAB at 17:04

## 2025-04-04 RX ADMIN — VALSARTAN 80 MG: 160 TABLET, FILM COATED ORAL at 09:07

## 2025-04-04 RX ADMIN — ACETAMINOPHEN 650 MG: 325 TABLET ORAL at 12:43

## 2025-04-04 ASSESSMENT — PAIN SCALES - GENERAL: PAINLEVEL_OUTOF10: 0

## 2025-04-04 NOTE — PROGRESS NOTES
[facility]  Hospitalist Group                                                                                          Hospitalist Progress Note  Forrest Garcia MD  Office Phone: [phone]        Date of Service:  2025  NAME:  Darshana Kraus  :  1950  MRN:  [id number]       Admission Summary:     Darshana Kraus is a 74 y.o. female with a past medical history significant for A-fib on AC therapy with Coumadin reportedly status post Watchman procedure, PPM, bipolar, hypertension, diabetes, hyperlipidemia, hypothyroidism who presents with persistent GI bleeding.  Patient states she was just discharged from the hospital on 3/21/25 and since that period of time her stools have remained tarry and bloody and she has had some lower abdominal pain.  Denies any nausea or vomiting but does endorse some dizziness.  She has not had any outpatient follow-up and has not contacted the physicians that she saw here as she misplaced her paperwork.  She notes that her stools have been very dark in color and tarry in appearance but that she also has some bright red blood mixed in.  On review of the chart she underwent colonoscopy/EGD on her last admission with findings of a 1 cm AVM that was cauterized and a 2 cm hiatal hernia.  She states she has been taking her medications as directed.     In the ER a CTA of her abdomen demonstrated no active bleeding, a large volume of stool throughout the colon and superior mesenteric artery ostial stenosis.  Her hemoglobin today is 8.5 which is down from her discharge hemoglobin of 9.4.  Her vital signs are stable with her pressure actually being mildly elevated.      The patient denies any headache, blurry vision, sore throat, trouble swallowing, trouble with speech, chest pain, SOB, cough, fever, chills, N/V/D,  urinary symptoms, constipation, recent travels, sick contacts, focal or generalized neurological symptoms, falls, injuries, rashes, contact with COVID-19  given all available resources at the time of admission. Route is PO if not otherwise noted.      Admission Status:[id number]:1}      Medications Reviewed:     Current Facility-Administered Medications   Medication Dose Route Frequency    sodium chloride flush 0.9 % injection 5-40 mL  5-40 mL IntraVENous 2 times per day    sodium chloride flush 0.9 % injection 5-40 mL  5-40 mL IntraVENous PRN    0.9 % sodium chloride infusion   IntraVENous PRN    ondansetron (ZOFRAN-ODT) disintegrating tablet 4 mg  4 mg Oral Q8H PRN    Or    ondansetron (ZOFRAN) injection 4 mg  4 mg IntraVENous Q6H PRN    acetaminophen (TYLENOL) tablet 650 mg  650 mg Oral Q6H PRN    Or    acetaminophen (TYLENOL) suppository 650 mg  650 mg Rectal Q6H PRN    [Held by provider] aspirin chewable tablet 81 mg  81 mg Oral Daily    atorvastatin (LIPITOR) tablet 20 mg  20 mg Oral Daily    ferrous sulfate (IRON 325) tablet 325 mg  325 mg Oral BID WC    [Held by provider] gabapentin (NEURONTIN) capsule 300 mg  300 mg Oral TID PRN    levothyroxine (SYNTHROID) tablet 75 mcg  75 mcg Oral QAM AC    metoprolol succinate (TOPROL XL) extended release tablet 25 mg  25 mg Oral Daily    OXcarbazepine (TRILEPTAL) tablet 300 mg  300 mg Oral BID    oxyBUTYnin (DITROPAN-XL) extended release tablet 5 mg  5 mg Oral Daily    valsartan (DIOVAN) tablet 80 mg  80 mg Oral BID    venlafaxine (EFFEXOR XR) extended release capsule 75 mg  75 mg Oral Daily with breakfast    0.9 % sodium chloride infusion   IntraVENous Continuous     ______________________________________________________________________  EXPECTED LENGTH OF STAY: 3  ACTUAL LENGTH OF STAY:          1                 Forrest Garcia MD

## 2025-04-04 NOTE — ANESTHESIA POSTPROCEDURE EVALUATION
Post-Anesthesia Evaluation and Assessment    Patient: Darshana Kraus MRN: [id number]  SSN: [id number]    YOB: 1950  Age: 74 y.o.  Sex: female      I have evaluated the patient and they are stable and ready for discharge from the PACU.     Cardiovascular Function/Vital Signs  Visit Vitals  BP (!) 158/71   Pulse 69   Temp 97.8 °F (36.6 °C) (Oral)   Resp 20   Ht 1.727 m (5' 8\")   Wt 79.9 kg (176 lb 2.4 oz)   SpO2 100%   BMI 26.78 kg/m²       Patient is status post Monitor Anesthesia Care anesthesia for Procedure(s):  ESOPHAGOGASTRODUODENOSCOPY.    Nausea/Vomiting: None    Postoperative hydration reviewed and adequate.    Pain:  Managed    Neurological Status:   At baseline    Mental Status, Level of Consciousness: Alert and  oriented to person, place, and time    Pulmonary Status:   Adequate oxygenation and airway patent    Complications related to anesthesia: None    Post-anesthesia assessment completed. No concerns    Signed By: Jennifer Leigh DO     April 4, 2025

## 2025-04-04 NOTE — PROGRESS NOTES
[facility]   [address] [address]       GI PROGRESS NOTE  Lorenza Teixeira PA-C  [phone] office  NP/PA in-hospital M-F until 4:30PM  After 5PM or on weekends, please call  for physician on call      NAME: Darshana Kraus   :  1950   MRN:  [id number]       Subjective:     Capsule endoscopy cancelled today as patient was not adequately prepped. Hgb stable. Continue PPI. Will arrange for outpatient capsule endoscopy. Patient was discussed with Dr. Lucas. Will sign off, please reach out for further management this admission.     Signed By: Lorenza Teixeira PA-C     2025  4:53 PM

## 2025-04-04 NOTE — PROGRESS NOTES
Pre-procedure report received from LIGIA Wiggins for capsule study. Per RN, was notified last night that capsule study \"wasn't a sure thing\", despite being in MD op note. Pt did not receive and Miralax doses cancelled in MAR. Dr. Lucas notified, capsule on hold for now. Will readdress.

## 2025-04-05 VITALS
TEMPERATURE: 97.9 F | OXYGEN SATURATION: 98 % | DIASTOLIC BLOOD PRESSURE: 68 MMHG | WEIGHT: 176.15 LBS | HEART RATE: 80 BPM | RESPIRATION RATE: 15 BRPM | BODY MASS INDEX: 26.7 KG/M2 | SYSTOLIC BLOOD PRESSURE: 164 MMHG | HEIGHT: 68 IN

## 2025-04-05 LAB
ALBUMIN SERPL-MCNC: 3.4 G/DL (ref 3.5–5)
ALBUMIN/GLOB SERPL: 0.9 (ref 1.1–2.2)
ALP SERPL-CCNC: 96 U/L (ref 45–117)
ALT SERPL-CCNC: 14 U/L (ref 12–78)
ANION GAP SERPL CALC-SCNC: 7 MMOL/L (ref 2–12)
AST SERPL-CCNC: 24 U/L (ref 15–37)
BASOPHILS # BLD: 0.01 K/UL (ref 0–0.1)
BASOPHILS NFR BLD: 0.2 % (ref 0–1)
BILIRUB SERPL-MCNC: 0.5 MG/DL (ref 0.2–1)
BUN SERPL-MCNC: 7 MG/DL (ref 6–20)
BUN/CREAT SERPL: 12 (ref 12–20)
CALCIUM SERPL-MCNC: 9 MG/DL (ref 8.5–10.1)
CHLORIDE SERPL-SCNC: 104 MMOL/L (ref 97–108)
CO2 SERPL-SCNC: 27 MMOL/L (ref 21–32)
CREAT SERPL-MCNC: 0.57 MG/DL (ref 0.55–1.02)
DIFFERENTIAL METHOD BLD: ABNORMAL
EOSINOPHIL # BLD: 0.26 K/UL (ref 0–0.4)
EOSINOPHIL NFR BLD: 4.7 % (ref 0–7)
ERYTHROCYTE [DISTWIDTH] IN BLOOD BY AUTOMATED COUNT: 16.2 % (ref 11.5–14.5)
GLOBULIN SER CALC-MCNC: 3.9 G/DL (ref 2–4)
GLUCOSE SERPL-MCNC: 98 MG/DL (ref 65–100)
HCT VFR BLD AUTO: 25.7 % (ref 35–47)
HGB BLD-MCNC: 8.4 G/DL (ref 11.5–16)
IMM GRANULOCYTES # BLD AUTO: 0.02 K/UL (ref 0–0.04)
IMM GRANULOCYTES NFR BLD AUTO: 0.4 % (ref 0–0.5)
LYMPHOCYTES # BLD: 0.73 K/UL (ref 0.8–3.5)
LYMPHOCYTES NFR BLD: 13.3 % (ref 12–49)
MCH RBC QN AUTO: 30.1 PG (ref 26–34)
MCHC RBC AUTO-ENTMCNC: 32.7 G/DL (ref 30–36.5)
MCV RBC AUTO: 92.1 FL (ref 80–99)
MONOCYTES # BLD: 0.41 K/UL (ref 0–1)
MONOCYTES NFR BLD: 7.5 % (ref 5–13)
NEUTS SEG # BLD: 4.07 K/UL (ref 1.8–8)
NEUTS SEG NFR BLD: 73.9 % (ref 32–75)
NRBC # BLD: 0 K/UL (ref 0–0.01)
NRBC BLD-RTO: 0 PER 100 WBC
PLATELET # BLD AUTO: 324 K/UL (ref 150–400)
PMV BLD AUTO: 9.3 FL (ref 8.9–12.9)
POTASSIUM SERPL-SCNC: 3.4 MMOL/L (ref 3.5–5.1)
PROT SERPL-MCNC: 7.3 G/DL (ref 6.4–8.2)
RBC # BLD AUTO: 2.79 M/UL (ref 3.8–5.2)
RBC MORPH BLD: ABNORMAL
SODIUM SERPL-SCNC: 138 MMOL/L (ref 136–145)
WBC # BLD AUTO: 5.5 K/UL (ref 3.6–11)

## 2025-04-05 PROCEDURE — 2500000003 HC RX 250 WO HCPCS: Performed by: NURSE PRACTITIONER

## 2025-04-05 PROCEDURE — 80053 COMPREHEN METABOLIC PANEL: CPT

## 2025-04-05 PROCEDURE — 2580000003 HC RX 258: Performed by: NURSE PRACTITIONER

## 2025-04-05 PROCEDURE — 85025 COMPLETE CBC W/AUTO DIFF WBC: CPT

## 2025-04-05 PROCEDURE — 6370000000 HC RX 637 (ALT 250 FOR IP): Performed by: NURSE PRACTITIONER

## 2025-04-05 RX ADMIN — SODIUM CHLORIDE: 0.9 INJECTION, SOLUTION INTRAVENOUS at 03:23

## 2025-04-05 RX ADMIN — OXYBUTYNIN CHLORIDE 5 MG: 5 TABLET, EXTENDED RELEASE ORAL at 08:41

## 2025-04-05 RX ADMIN — OXCARBAZEPINE 300 MG: 150 TABLET, FILM COATED ORAL at 08:41

## 2025-04-05 RX ADMIN — VALSARTAN 80 MG: 160 TABLET, FILM COATED ORAL at 08:41

## 2025-04-05 RX ADMIN — ATORVASTATIN CALCIUM 20 MG: 20 TABLET, FILM COATED ORAL at 08:42

## 2025-04-05 RX ADMIN — VENLAFAXINE HYDROCHLORIDE 75 MG: 37.5 CAPSULE, EXTENDED RELEASE ORAL at 08:41

## 2025-04-05 RX ADMIN — LEVOTHYROXINE SODIUM 75 MCG: 0.07 TABLET ORAL at 07:52

## 2025-04-05 RX ADMIN — METOPROLOL SUCCINATE 25 MG: 25 TABLET, EXTENDED RELEASE ORAL at 08:41

## 2025-04-05 RX ADMIN — FERROUS SULFATE TAB 325 MG (65 MG ELEMENTAL FE) 325 MG: 325 (65 FE) TAB at 08:41

## 2025-04-05 RX ADMIN — SODIUM CHLORIDE, PRESERVATIVE FREE 10 ML: 5 INJECTION INTRAVENOUS at 08:42

## 2025-04-05 ASSESSMENT — PAIN SCALES - GENERAL: PAINLEVEL_OUTOF10: 0

## 2025-04-05 NOTE — DISCHARGE SUMMARY
Discharge Summary   Please note that this dictation was completed with [employer], the computer voice recognition software.  Quite often unanticipated grammatical, syntax, homophones, and other interpretive errors are inadvertently transcribed by the computer software.  Please disregard these errors.  Please excuse any errors that have escaped final proofreading.    PATIENT ID: Darshana Kraus  MRN: [id number]   YOB: 1950    DATE OF ADMISSION: 4/3/2025  8:26 AM    DATE OF DISCHARGE: 4/5/2025  PRIMARY CARE PROVIDER: Zaid Conti DO         ATTENDING PHYSICIAN: Forrest Garcia MD  DISCHARGING PROVIDER: Forrest Garcia MD       CONSULTATIONS: IP CONSULT TO HOSPITALIST  IP CONSULT TO GI  IP CONSULT TO CARDIOLOGY    PROCEDURES/SURGERIES: Procedure(s):  ESOPHAGOGASTRODUODENOSCOPY    ADMITTING HPI from excerpted H&P         Darshana Kraus is a 74 y.o. female with a past medical history significant for A-fib on AC therapy with Coumadin reportedly status post Watchman procedure, PPM, bipolar, hypertension, diabetes, hyperlipidemia, hypothyroidism who presents with persistent GI bleeding.  Patient states she was just discharged from the hospital on 3/21/25 and since that period of time her stools have remained tarry and bloody and she has had some lower abdominal pain.  Denies any nausea or vomiting but does endorse some dizziness.  She has not had any outpatient follow-up and has not contacted the physicians that she saw here as she misplaced her paperwork.  She notes that her stools have been very dark in color and tarry in appearance but that she also has some bright red blood mixed in.  On review of the chart she underwent colonoscopy/EGD on her last admission with findings of a 1 cm AVM that was cauterized and a 2 cm hiatal hernia.  She states she has been taking her medications as directed.     In the ER a CTA of her abdomen demonstrated no active bleeding, a large volume of stool throughout the colon and  superior mesenteric artery ostial stenosis.  Her hemoglobin today is 8.5 which is down from her discharge hemoglobin of 9.4.  Her vital signs are stable with her pressure actually being mildly elevated.      The patient denies any headache, blurry vision, sore throat, trouble swallowing, trouble with speech, chest pain, SOB, cough, fever, chills, N/V/D,  urinary symptoms, constipation, recent travels, sick contacts, focal or generalized neurological symptoms, falls, injuries, rashes, contact with COVID-19 diagnosed patients, hematemesis, hemoptysis, hematuria, rashes, denies starting any new medications and denies any other concerns or problems besides as mentioned above.     HOSPITAL COURSE & DISCHARGE DIAGNOSIS/ PLAN:     Intermittent GI bleed in the setting of dual antiplatelets   Blood loss anemia due to above  History of right sided AVM status post APC 3/20/2025  -CT abdomen pelvis 4/3/2025 no active bleeding large volume of stool throughout the length of colon.  Superior mesenteric artery with ostial stenosis noted.  EGD 4/3/2025 mild nonerosive gastritis with scarring prepyloric area radiology above.  Gastritis or remote ulcer.  Normal mucosa of duodenum noted.     - DC Plavix indefinitely and cont  aspirin for now  - Patient to follow up GI as OP for capsule enteroscopy   - Follow up PCP        Atrial fibrillation  SSI status post PPM  Status post Watchman 1/31/2025  Hypertension  -Cont home regimen   - DC Plavix due to recurrent GI bleed and cont ASA      History of hypothyroidism  History of mood disorder  History of bipolar disorder  - Continue home regimen              PENDING TEST RESULTS:   At the time of discharge the following test results are still pending: None    [facility]    [facility]     ADDITIONAL CARE RECOMMENDATIONS: Follow up GI for capsule endoscopy     DIET:  Morrison diet     ACTIVITY: activity as tolerated    WOUND CARE: NA    EQUIPMENT needed: NA      DISCHARGE

## 2025-04-10 ENCOUNTER — OFFICE VISIT (OUTPATIENT)
Age: 75
End: 2025-04-10
Payer: MEDICARE

## 2025-04-10 VITALS
OXYGEN SATURATION: 99 % | WEIGHT: 187 LBS | BODY MASS INDEX: 28.34 KG/M2 | DIASTOLIC BLOOD PRESSURE: 86 MMHG | HEART RATE: 75 BPM | HEIGHT: 68 IN | SYSTOLIC BLOOD PRESSURE: 138 MMHG

## 2025-04-10 DIAGNOSIS — Z86.79 S/P ABLATION OF ATRIAL FIBRILLATION: ICD-10-CM

## 2025-04-10 DIAGNOSIS — I48.19 PERSISTENT ATRIAL FIBRILLATION (HCC): Primary | ICD-10-CM

## 2025-04-10 DIAGNOSIS — Z98.890 S/P ABLATION OF ATRIAL FIBRILLATION: ICD-10-CM

## 2025-04-10 DIAGNOSIS — Z95.818 PRESENCE OF WATCHMAN LEFT ATRIAL APPENDAGE CLOSURE DEVICE: ICD-10-CM

## 2025-04-10 PROCEDURE — 3075F SYST BP GE 130 - 139MM HG: CPT | Performed by: INTERNAL MEDICINE

## 2025-04-10 PROCEDURE — G8400 PT W/DXA NO RESULTS DOC: HCPCS | Performed by: INTERNAL MEDICINE

## 2025-04-10 PROCEDURE — 1111F DSCHRG MED/CURRENT MED MERGE: CPT | Performed by: INTERNAL MEDICINE

## 2025-04-10 PROCEDURE — 99214 OFFICE O/P EST MOD 30 MIN: CPT | Performed by: INTERNAL MEDICINE

## 2025-04-10 PROCEDURE — 3079F DIAST BP 80-89 MM HG: CPT | Performed by: INTERNAL MEDICINE

## 2025-04-10 PROCEDURE — 1123F ACP DISCUSS/DSCN MKR DOCD: CPT | Performed by: INTERNAL MEDICINE

## 2025-04-10 PROCEDURE — 1036F TOBACCO NON-USER: CPT | Performed by: INTERNAL MEDICINE

## 2025-04-10 PROCEDURE — 3017F COLORECTAL CA SCREEN DOC REV: CPT | Performed by: INTERNAL MEDICINE

## 2025-04-10 PROCEDURE — 1159F MED LIST DOCD IN RCRD: CPT | Performed by: INTERNAL MEDICINE

## 2025-04-10 PROCEDURE — 1090F PRES/ABSN URINE INCON ASSESS: CPT | Performed by: INTERNAL MEDICINE

## 2025-04-10 PROCEDURE — G8427 DOCREV CUR MEDS BY ELIG CLIN: HCPCS | Performed by: INTERNAL MEDICINE

## 2025-04-10 PROCEDURE — 1126F AMNT PAIN NOTED NONE PRSNT: CPT | Performed by: INTERNAL MEDICINE

## 2025-04-10 PROCEDURE — G8417 CALC BMI ABV UP PARAM F/U: HCPCS | Performed by: INTERNAL MEDICINE

## 2025-04-10 RX ORDER — CLOTRIMAZOLE AND BETAMETHASONE DIPROPIONATE 10; .64 MG/G; MG/G
CREAM TOPICAL
COMMUNITY
Start: 2025-03-26

## 2025-04-10 ASSESSMENT — PATIENT HEALTH QUESTIONNAIRE - PHQ9
SUM OF ALL RESPONSES TO PHQ QUESTIONS 1-9: 0
1. LITTLE INTEREST OR PLEASURE IN DOING THINGS: NOT AT ALL
SUM OF ALL RESPONSES TO PHQ QUESTIONS 1-9: 0
2. FEELING DOWN, DEPRESSED OR HOPELESS: NOT AT ALL
SUM OF ALL RESPONSES TO PHQ QUESTIONS 1-9: 0
SUM OF ALL RESPONSES TO PHQ QUESTIONS 1-9: 0

## 2025-04-10 NOTE — PROGRESS NOTES
Cardiology  Clinic -   Follow up Visit   Date of Service : 4/10/2025    Name: Darshana Kraus  Patient ID: [id number]  Age: 74 y.o. Sex: female YOB: 1950    Author: CECILIA FLORENCE MD    PCP: Zaid Conti DO    Referring Provider:Zaid Conti DO    Reason for Visit / CC:    Chief Complaint   Patient presents with    New Patient         ASSESSMENT  AND PLAN           Assessment & Plan  1. Paroxysmal atrial fibrillation: Status post ablation, status post Watchman, now in normal sinus rhythm.  - Continue aspirin 81 mg orally once daily  - Regular follow-ups with Dr. Olguin--  --patient was recently hospitalized with GI bleeding subsequently anticoagulation was discontinued    2. Hypertension: Fairly well controlled.  - Continue Toprol-XL 25 mg once daily  - Continue valsartan 80 mg twice daily  - Continue hydrochlorothiazide 12.5 mg once daily    3. Hypothyroidism: Fairly well controlled.  - Continue levothyroxine    4. Gastrointestinal bleeding: Improved post-cauterization of cecal AVM and hemorrhoids.  - Discontinue Coumadin    5. Diabetes mellitus: Fairly well controlled.  - Continue metformin 500 mg twice daily    6. Peripheral neuropathy: Doing fairly well.  - Continue gabapentin    7. Hyperlipidemia: Fairly well controlled.  - Continue Lipitor 20 mg once daily    8.  Sick sinus node syndrome status post pacemaker--advised to follow-up with Dr. Olguin for pacemaker interrogation    Follow-up  - Follow up in 6 months        Patient advised aggressive risk factor and lifestyle modification- diet and exercise counseling done   patient advised strict compliance with medication and follow-up   handouts were given to patient along with  trustworthy websites for more information       An electronic signature was used to authenticate this note.    Cecilia Florence MD FACC FACP  Cardiologist   [facility]  [facility]  4/10/2025  6:56 PM

## 2025-04-10 NOTE — PATIENT INSTRUCTIONS
Dear Darshana,    Thank you for visiting today. Your dedication to your health care is greatly appreciated.    Following our consultation, here are the key instructions for your care plan:    - Medications:    - Continue taking aspirin as prescribed    - Continue Metoprolol 25 mg once a day    - Continue hydrochlorothiazide 12.5 mg as prescribed    - Continue Valsartan 80 mg twice a day    - Continue gabapentin 3 times a day for leg pain    - Follow-up:    - Schedule a follow-up appointment in 6 months    Your health and well-being are our top priority. Please reach out if you have any questions or concerns.    Sincerely,    Fernie Florence MD  Cardiology

## 2025-04-23 LAB — ECHO BSA: 1.93 M2

## 2025-08-18 ENCOUNTER — APPOINTMENT (OUTPATIENT)
Facility: HOSPITAL | Age: 75
DRG: 641 | End: 2025-08-18
Payer: MEDICARE

## 2025-08-18 ENCOUNTER — HOSPITAL ENCOUNTER (INPATIENT)
Facility: HOSPITAL | Age: 75
LOS: 3 days | Discharge: HOME | DRG: 641 | End: 2025-08-21
Attending: STUDENT IN AN ORGANIZED HEALTH CARE EDUCATION/TRAINING PROGRAM | Admitting: STUDENT IN AN ORGANIZED HEALTH CARE EDUCATION/TRAINING PROGRAM
Payer: MEDICARE

## 2025-08-18 DIAGNOSIS — I65.23 BILATERAL CAROTID ARTERY STENOSIS: ICD-10-CM

## 2025-08-18 DIAGNOSIS — R27.0 ATAXIA: ICD-10-CM

## 2025-08-18 DIAGNOSIS — I48.0 PAF (PAROXYSMAL ATRIAL FIBRILLATION) (HCC): ICD-10-CM

## 2025-08-18 DIAGNOSIS — I49.5 SICK SINUS SYNDROME (HCC): ICD-10-CM

## 2025-08-18 DIAGNOSIS — D62 ACUTE BLOOD LOSS ANEMIA: ICD-10-CM

## 2025-08-18 DIAGNOSIS — H53.8 BLURRY VISION: ICD-10-CM

## 2025-08-18 DIAGNOSIS — I42.0 DILATED CARDIOMYOPATHY (HCC): ICD-10-CM

## 2025-08-18 DIAGNOSIS — Z95.818 PRESENCE OF WATCHMAN LEFT ATRIAL APPENDAGE CLOSURE DEVICE: ICD-10-CM

## 2025-08-18 DIAGNOSIS — E87.1 HYPONATREMIA: Primary | ICD-10-CM

## 2025-08-18 DIAGNOSIS — I10 PRIMARY HYPERTENSION: ICD-10-CM

## 2025-08-18 DIAGNOSIS — R09.02 HYPOXIA: ICD-10-CM

## 2025-08-18 DIAGNOSIS — F31.9 BIPOLAR 1 DISORDER (HCC): ICD-10-CM

## 2025-08-18 DIAGNOSIS — R42 DIZZINESS: ICD-10-CM

## 2025-08-18 DIAGNOSIS — I48.0 PAROXYSMAL ATRIAL FIBRILLATION (HCC): ICD-10-CM

## 2025-08-18 LAB
ALBUMIN SERPL-MCNC: 4.1 G/DL (ref 3.5–5.2)
ALBUMIN/GLOB SERPL: 0.9 (ref 1.1–2.2)
ALP SERPL-CCNC: 146 U/L (ref 35–104)
ALT SERPL-CCNC: 17 U/L (ref 10–35)
ANION GAP SERPL CALC-SCNC: 11 MMOL/L (ref 2–14)
APPEARANCE UR: CLEAR
AST SERPL-CCNC: 49 U/L (ref 10–35)
BACTERIA URNS QL MICRO: NEGATIVE /HPF
BASOPHILS # BLD: 0.02 K/UL (ref 0–0.1)
BASOPHILS NFR BLD: 0.4 % (ref 0–1)
BILIRUB SERPL-MCNC: 0.4 MG/DL (ref 0–1.2)
BILIRUB UR QL: NEGATIVE
BUN SERPL-MCNC: 15 MG/DL (ref 8–23)
BUN/CREAT SERPL: 21 (ref 12–20)
CALCIUM SERPL-MCNC: 10 MG/DL (ref 8.8–10.2)
CHLORIDE SERPL-SCNC: 89 MMOL/L (ref 98–107)
CO2 SERPL-SCNC: 26 MMOL/L (ref 20–29)
COLOR UR: ABNORMAL
CREAT SERPL-MCNC: 0.7 MG/DL (ref 0.6–1)
DIFFERENTIAL METHOD BLD: ABNORMAL
EKG ATRIAL RATE: 66 BPM
EKG DIAGNOSIS: NORMAL
EKG P AXIS: 29 DEGREES
EKG P-R INTERVAL: 182 MS
EKG Q-T INTERVAL: 386 MS
EKG QRS DURATION: 108 MS
EKG QTC CALCULATION (BAZETT): 404 MS
EKG R AXIS: -36 DEGREES
EKG T AXIS: 86 DEGREES
EKG VENTRICULAR RATE: 66 BPM
EOSINOPHIL # BLD: 0.34 K/UL (ref 0–0.4)
EOSINOPHIL NFR BLD: 6.3 % (ref 0–7)
EPITH CASTS URNS QL MICRO: ABNORMAL /LPF
ERYTHROCYTE [DISTWIDTH] IN BLOOD BY AUTOMATED COUNT: 15.8 % (ref 11.5–14.5)
ETHANOL SERPL-MCNC: <11 MG/DL (ref 0–0.08)
GLOBULIN SER CALC-MCNC: 4.8 G/DL (ref 2–4)
GLUCOSE BLD STRIP.AUTO-MCNC: 118 MG/DL (ref 65–117)
GLUCOSE SERPL-MCNC: 112 MG/DL (ref 65–100)
GLUCOSE UR STRIP.AUTO-MCNC: NEGATIVE MG/DL
HCT VFR BLD AUTO: 29.5 % (ref 35–47)
HGB BLD-MCNC: 9.4 G/DL (ref 11.5–16)
HGB UR QL STRIP: NEGATIVE
IMM GRANULOCYTES # BLD AUTO: 0.02 K/UL (ref 0–0.04)
IMM GRANULOCYTES NFR BLD AUTO: 0.4 % (ref 0–0.5)
KETONES UR QL STRIP.AUTO: NEGATIVE MG/DL
LEUKOCYTE ESTERASE UR QL STRIP.AUTO: ABNORMAL
LIPASE SERPL-CCNC: 60 U/L (ref 13–60)
LYMPHOCYTES # BLD: 0.62 K/UL (ref 0.8–3.5)
LYMPHOCYTES NFR BLD: 11.5 % (ref 12–49)
MCH RBC QN AUTO: 28.1 PG (ref 26–34)
MCHC RBC AUTO-ENTMCNC: 31.9 G/DL (ref 30–36.5)
MCV RBC AUTO: 88.3 FL (ref 80–99)
MONOCYTES # BLD: 0.44 K/UL (ref 0–1)
MONOCYTES NFR BLD: 8.2 % (ref 5–13)
NEUTS SEG # BLD: 3.96 K/UL (ref 1.8–8)
NEUTS SEG NFR BLD: 73.2 % (ref 32–75)
NITRITE UR QL STRIP.AUTO: NEGATIVE
NRBC # BLD: 0 K/UL (ref 0–0.01)
NRBC BLD-RTO: 0 PER 100 WBC
PH UR STRIP: 6.5 (ref 5–8)
PLATELET # BLD AUTO: 268 K/UL (ref 150–400)
PMV BLD AUTO: 9.2 FL (ref 8.9–12.9)
POTASSIUM SERPL-SCNC: 4.6 MMOL/L (ref 3.5–5.1)
PROT SERPL-MCNC: 8.9 G/DL (ref 6.4–8.3)
PROT UR STRIP-MCNC: NEGATIVE MG/DL
RBC # BLD AUTO: 3.34 M/UL (ref 3.8–5.2)
RBC #/AREA URNS HPF: ABNORMAL /HPF (ref 0–5)
RBC MORPH BLD: ABNORMAL
RBC MORPH BLD: ABNORMAL
SERVICE CMNT-IMP: ABNORMAL
SODIUM SERPL-SCNC: 127 MMOL/L (ref 136–145)
SP GR UR REFRACTOMETRY: 1
URINE CULTURE IF INDICATED: ABNORMAL
UROBILINOGEN UR QL STRIP.AUTO: 0.2 EU/DL (ref 0.2–1)
WBC # BLD AUTO: 5.4 K/UL (ref 3.6–11)
WBC URNS QL MICRO: ABNORMAL /HPF (ref 0–4)

## 2025-08-18 PROCEDURE — 93005 ELECTROCARDIOGRAM TRACING: CPT | Performed by: STUDENT IN AN ORGANIZED HEALTH CARE EDUCATION/TRAINING PROGRAM

## 2025-08-18 PROCEDURE — 99285 EMERGENCY DEPT VISIT HI MDM: CPT

## 2025-08-18 PROCEDURE — 70496 CT ANGIOGRAPHY HEAD: CPT

## 2025-08-18 PROCEDURE — 80053 COMPREHEN METABOLIC PANEL: CPT

## 2025-08-18 PROCEDURE — 6370000000 HC RX 637 (ALT 250 FOR IP)

## 2025-08-18 PROCEDURE — 83690 ASSAY OF LIPASE: CPT

## 2025-08-18 PROCEDURE — 2500000003 HC RX 250 WO HCPCS

## 2025-08-18 PROCEDURE — 1100000000 HC RM PRIVATE

## 2025-08-18 PROCEDURE — 82077 ASSAY SPEC XCP UR&BREATH IA: CPT

## 2025-08-18 PROCEDURE — 36415 COLL VENOUS BLD VENIPUNCTURE: CPT

## 2025-08-18 PROCEDURE — 2580000003 HC RX 258

## 2025-08-18 PROCEDURE — 6360000004 HC RX CONTRAST MEDICATION: Performed by: STUDENT IN AN ORGANIZED HEALTH CARE EDUCATION/TRAINING PROGRAM

## 2025-08-18 PROCEDURE — 85025 COMPLETE CBC W/AUTO DIFF WBC: CPT

## 2025-08-18 PROCEDURE — 82962 GLUCOSE BLOOD TEST: CPT

## 2025-08-18 PROCEDURE — 2580000003 HC RX 258: Performed by: STUDENT IN AN ORGANIZED HEALTH CARE EDUCATION/TRAINING PROGRAM

## 2025-08-18 PROCEDURE — 81001 URINALYSIS AUTO W/SCOPE: CPT

## 2025-08-18 PROCEDURE — 70450 CT HEAD/BRAIN W/O DYE: CPT

## 2025-08-18 RX ORDER — ACETAMINOPHEN 325 MG/1
650 TABLET ORAL EVERY 8 HOURS PRN
Status: DISCONTINUED | OUTPATIENT
Start: 2025-08-18 | End: 2025-08-18 | Stop reason: SDUPTHER

## 2025-08-18 RX ORDER — PHENOBARBITAL 32.4 MG/1
32.4 TABLET ORAL 2 TIMES DAILY
Status: COMPLETED | OUTPATIENT
Start: 2025-08-19 | End: 2025-08-20

## 2025-08-18 RX ORDER — PHENOBARBITAL 32.4 MG/1
32.4 TABLET ORAL EVERY 6 HOURS PRN
Status: ACTIVE | OUTPATIENT
Start: 2025-08-18 | End: 2025-08-20

## 2025-08-18 RX ORDER — ONDANSETRON 2 MG/ML
4 INJECTION INTRAMUSCULAR; INTRAVENOUS EVERY 6 HOURS PRN
Status: DISCONTINUED | OUTPATIENT
Start: 2025-08-18 | End: 2025-08-21 | Stop reason: HOSPADM

## 2025-08-18 RX ORDER — PHENOBARBITAL 32.4 MG/1
16.2 TABLET ORAL EVERY 6 HOURS PRN
Status: DISCONTINUED | OUTPATIENT
Start: 2025-08-20 | End: 2025-08-21 | Stop reason: HOSPADM

## 2025-08-18 RX ORDER — 0.9 % SODIUM CHLORIDE 0.9 %
1000 INTRAVENOUS SOLUTION INTRAVENOUS ONCE
Status: COMPLETED | OUTPATIENT
Start: 2025-08-18 | End: 2025-08-18

## 2025-08-18 RX ORDER — ONDANSETRON 4 MG/1
4 TABLET, ORALLY DISINTEGRATING ORAL EVERY 8 HOURS PRN
Status: DISCONTINUED | OUTPATIENT
Start: 2025-08-18 | End: 2025-08-21 | Stop reason: HOSPADM

## 2025-08-18 RX ORDER — POLYETHYLENE GLYCOL 3350 17 G/17G
17 POWDER, FOR SOLUTION ORAL DAILY PRN
Status: DISCONTINUED | OUTPATIENT
Start: 2025-08-18 | End: 2025-08-21 | Stop reason: HOSPADM

## 2025-08-18 RX ORDER — SODIUM CHLORIDE 0.9 % (FLUSH) 0.9 %
5-40 SYRINGE (ML) INJECTION EVERY 12 HOURS SCHEDULED
Status: DISCONTINUED | OUTPATIENT
Start: 2025-08-18 | End: 2025-08-21 | Stop reason: HOSPADM

## 2025-08-18 RX ORDER — ASPIRIN 81 MG/1
81 TABLET, CHEWABLE ORAL DAILY
Status: DISCONTINUED | OUTPATIENT
Start: 2025-08-18 | End: 2025-08-21 | Stop reason: HOSPADM

## 2025-08-18 RX ORDER — MAGNESIUM SULFATE IN WATER 40 MG/ML
2000 INJECTION, SOLUTION INTRAVENOUS PRN
Status: DISCONTINUED | OUTPATIENT
Start: 2025-08-18 | End: 2025-08-21 | Stop reason: HOSPADM

## 2025-08-18 RX ORDER — OXYBUTYNIN CHLORIDE 5 MG/1
5 TABLET, EXTENDED RELEASE ORAL DAILY
Status: DISCONTINUED | OUTPATIENT
Start: 2025-08-18 | End: 2025-08-21 | Stop reason: HOSPADM

## 2025-08-18 RX ORDER — PHENOBARBITAL 32.4 MG/1
16.2 TABLET ORAL 2 TIMES DAILY
Status: COMPLETED | OUTPATIENT
Start: 2025-08-20 | End: 2025-08-21

## 2025-08-18 RX ORDER — PHENOBARBITAL 32.4 MG/1
32.4 TABLET ORAL 4 TIMES DAILY
Status: DISPENSED | OUTPATIENT
Start: 2025-08-18 | End: 2025-08-19

## 2025-08-18 RX ORDER — ATORVASTATIN CALCIUM 40 MG/1
80 TABLET, FILM COATED ORAL NIGHTLY
Status: DISCONTINUED | OUTPATIENT
Start: 2025-08-19 | End: 2025-08-21 | Stop reason: HOSPADM

## 2025-08-18 RX ORDER — SODIUM CHLORIDE 0.9 % (FLUSH) 0.9 %
5-40 SYRINGE (ML) INJECTION PRN
Status: DISCONTINUED | OUTPATIENT
Start: 2025-08-18 | End: 2025-08-21 | Stop reason: HOSPADM

## 2025-08-18 RX ORDER — ATORVASTATIN CALCIUM 20 MG/1
20 TABLET, FILM COATED ORAL DAILY
Status: DISCONTINUED | OUTPATIENT
Start: 2025-08-18 | End: 2025-08-18

## 2025-08-18 RX ORDER — GABAPENTIN 300 MG/1
300 CAPSULE ORAL 3 TIMES DAILY PRN
Status: DISCONTINUED | OUTPATIENT
Start: 2025-08-18 | End: 2025-08-21 | Stop reason: HOSPADM

## 2025-08-18 RX ORDER — METOPROLOL SUCCINATE 25 MG/1
25 TABLET, EXTENDED RELEASE ORAL DAILY
Status: DISCONTINUED | OUTPATIENT
Start: 2025-08-18 | End: 2025-08-21 | Stop reason: HOSPADM

## 2025-08-18 RX ORDER — OXCARBAZEPINE 300 MG/1
300 TABLET, FILM COATED ORAL 2 TIMES DAILY
Status: DISCONTINUED | OUTPATIENT
Start: 2025-08-18 | End: 2025-08-21 | Stop reason: HOSPADM

## 2025-08-18 RX ORDER — SODIUM CHLORIDE 9 MG/ML
INJECTION, SOLUTION INTRAVENOUS PRN
Status: DISCONTINUED | OUTPATIENT
Start: 2025-08-18 | End: 2025-08-21 | Stop reason: HOSPADM

## 2025-08-18 RX ORDER — GLUCAGON 1 MG/ML
1 KIT INJECTION PRN
Status: DISCONTINUED | OUTPATIENT
Start: 2025-08-18 | End: 2025-08-21 | Stop reason: HOSPADM

## 2025-08-18 RX ORDER — ACETAMINOPHEN 650 MG/1
650 SUPPOSITORY RECTAL EVERY 6 HOURS PRN
Status: DISCONTINUED | OUTPATIENT
Start: 2025-08-18 | End: 2025-08-21 | Stop reason: HOSPADM

## 2025-08-18 RX ORDER — POTASSIUM CHLORIDE 1500 MG/1
40 TABLET, EXTENDED RELEASE ORAL PRN
Status: DISCONTINUED | OUTPATIENT
Start: 2025-08-18 | End: 2025-08-21 | Stop reason: HOSPADM

## 2025-08-18 RX ORDER — DEXTROSE MONOHYDRATE 100 MG/ML
INJECTION, SOLUTION INTRAVENOUS CONTINUOUS PRN
Status: DISCONTINUED | OUTPATIENT
Start: 2025-08-18 | End: 2025-08-21 | Stop reason: HOSPADM

## 2025-08-18 RX ORDER — LEVOTHYROXINE SODIUM 75 UG/1
75 TABLET ORAL
Status: DISCONTINUED | OUTPATIENT
Start: 2025-08-19 | End: 2025-08-21 | Stop reason: HOSPADM

## 2025-08-18 RX ORDER — IOPAMIDOL 755 MG/ML
100 INJECTION, SOLUTION INTRAVASCULAR
Status: COMPLETED | OUTPATIENT
Start: 2025-08-18 | End: 2025-08-18

## 2025-08-18 RX ORDER — VALSARTAN 80 MG/1
80 TABLET ORAL 2 TIMES DAILY
Status: DISCONTINUED | OUTPATIENT
Start: 2025-08-18 | End: 2025-08-21 | Stop reason: HOSPADM

## 2025-08-18 RX ORDER — INSULIN LISPRO 100 [IU]/ML
0-4 INJECTION, SOLUTION INTRAVENOUS; SUBCUTANEOUS
Status: DISCONTINUED | OUTPATIENT
Start: 2025-08-18 | End: 2025-08-21 | Stop reason: HOSPADM

## 2025-08-18 RX ORDER — ACETAMINOPHEN 325 MG/1
650 TABLET ORAL EVERY 6 HOURS PRN
Status: DISCONTINUED | OUTPATIENT
Start: 2025-08-18 | End: 2025-08-21 | Stop reason: HOSPADM

## 2025-08-18 RX ORDER — FERROUS SULFATE 325(65) MG
325 TABLET ORAL 2 TIMES DAILY WITH MEALS
Status: DISCONTINUED | OUTPATIENT
Start: 2025-08-18 | End: 2025-08-21 | Stop reason: HOSPADM

## 2025-08-18 RX ORDER — VENLAFAXINE HYDROCHLORIDE 37.5 MG/1
75 CAPSULE, EXTENDED RELEASE ORAL
Status: DISCONTINUED | OUTPATIENT
Start: 2025-08-19 | End: 2025-08-21 | Stop reason: HOSPADM

## 2025-08-18 RX ORDER — SODIUM CHLORIDE 9 MG/ML
INJECTION, SOLUTION INTRAVENOUS CONTINUOUS
Status: ACTIVE | OUTPATIENT
Start: 2025-08-18 | End: 2025-08-19

## 2025-08-18 RX ORDER — POTASSIUM CHLORIDE 7.45 MG/ML
10 INJECTION INTRAVENOUS PRN
Status: DISCONTINUED | OUTPATIENT
Start: 2025-08-18 | End: 2025-08-21 | Stop reason: HOSPADM

## 2025-08-18 RX ADMIN — ASPIRIN 81 MG: 81 TABLET, CHEWABLE ORAL at 18:48

## 2025-08-18 RX ADMIN — ACETAMINOPHEN 650 MG: 325 TABLET ORAL at 22:57

## 2025-08-18 RX ADMIN — METOPROLOL SUCCINATE 25 MG: 25 TABLET, EXTENDED RELEASE ORAL at 18:48

## 2025-08-18 RX ADMIN — VALSARTAN 80 MG: 80 TABLET, FILM COATED ORAL at 22:13

## 2025-08-18 RX ADMIN — OXYBUTYNIN CHLORIDE 5 MG: 5 TABLET, EXTENDED RELEASE ORAL at 18:48

## 2025-08-18 RX ADMIN — PHENOBARBITAL 32.4 MG: 32.4 TABLET ORAL at 22:13

## 2025-08-18 RX ADMIN — IOPAMIDOL 100 ML: 755 INJECTION, SOLUTION INTRAVENOUS at 16:15

## 2025-08-18 RX ADMIN — OXCARBAZEPINE 300 MG: 300 TABLET, FILM COATED ORAL at 22:13

## 2025-08-18 RX ADMIN — GABAPENTIN 300 MG: 300 CAPSULE ORAL at 22:57

## 2025-08-18 RX ADMIN — SODIUM CHLORIDE 1000 ML: 0.9 INJECTION, SOLUTION INTRAVENOUS at 14:23

## 2025-08-18 RX ADMIN — MELATONIN 3 MG: at 22:13

## 2025-08-18 RX ADMIN — SODIUM CHLORIDE: 0.9 INJECTION, SOLUTION INTRAVENOUS at 18:48

## 2025-08-18 RX ADMIN — SODIUM CHLORIDE, PRESERVATIVE FREE 10 ML: 5 INJECTION INTRAVENOUS at 22:19

## 2025-08-18 RX ADMIN — FERROUS SULFATE TAB 325 MG (65 MG ELEMENTAL FE) 325 MG: 325 (65 FE) TAB at 18:48

## 2025-08-18 ASSESSMENT — PAIN SCALES - GENERAL
PAINLEVEL_OUTOF10: 0
PAINLEVEL_OUTOF10: 9
PAINLEVEL_OUTOF10: 0

## 2025-08-18 ASSESSMENT — PAIN - FUNCTIONAL ASSESSMENT: PAIN_FUNCTIONAL_ASSESSMENT: 0-10

## 2025-08-18 ASSESSMENT — PAIN DESCRIPTION - ORIENTATION: ORIENTATION: RIGHT;LEFT

## 2025-08-18 ASSESSMENT — LIFESTYLE VARIABLES
HOW OFTEN DO YOU HAVE A DRINK CONTAINING ALCOHOL: PATIENT DECLINED
HOW MANY STANDARD DRINKS CONTAINING ALCOHOL DO YOU HAVE ON A TYPICAL DAY: PATIENT DECLINED

## 2025-08-18 ASSESSMENT — PAIN DESCRIPTION - LOCATION: LOCATION: LEG

## 2025-08-18 ASSESSMENT — PAIN DESCRIPTION - DESCRIPTORS: DESCRIPTORS: TINGLING

## 2025-08-19 LAB
ALBUMIN SERPL-MCNC: 3.4 G/DL (ref 3.5–5.2)
ALBUMIN/GLOB SERPL: 0.8 (ref 1.1–2.2)
ALP SERPL-CCNC: 96 U/L (ref 35–104)
ALT SERPL-CCNC: 11 U/L (ref 10–35)
ANION GAP SERPL CALC-SCNC: 10 MMOL/L (ref 2–14)
AST SERPL-CCNC: 34 U/L (ref 10–35)
BASOPHILS # BLD: 0.01 K/UL (ref 0–0.1)
BASOPHILS NFR BLD: 0.3 % (ref 0–1)
BILIRUB SERPL-MCNC: 0.3 MG/DL (ref 0–1.2)
BUN SERPL-MCNC: 11 MG/DL (ref 8–23)
BUN/CREAT SERPL: 18 (ref 12–20)
CALCIUM SERPL-MCNC: 9.1 MG/DL (ref 8.8–10.2)
CHLORIDE SERPL-SCNC: 98 MMOL/L (ref 98–107)
CO2 SERPL-SCNC: 24 MMOL/L (ref 20–29)
CREAT SERPL-MCNC: 0.63 MG/DL (ref 0.6–1)
DIFFERENTIAL METHOD BLD: ABNORMAL
EOSINOPHIL # BLD: 0.22 K/UL (ref 0–0.4)
EOSINOPHIL NFR BLD: 6.2 % (ref 0–7)
ERYTHROCYTE [DISTWIDTH] IN BLOOD BY AUTOMATED COUNT: 16.3 % (ref 11.5–14.5)
GLOBULIN SER CALC-MCNC: 4 G/DL (ref 2–4)
GLUCOSE BLD STRIP.AUTO-MCNC: 122 MG/DL (ref 65–117)
GLUCOSE BLD STRIP.AUTO-MCNC: 125 MG/DL (ref 65–117)
GLUCOSE BLD STRIP.AUTO-MCNC: 81 MG/DL (ref 65–117)
GLUCOSE BLD STRIP.AUTO-MCNC: 96 MG/DL (ref 65–117)
GLUCOSE SERPL-MCNC: 83 MG/DL (ref 65–100)
HCT VFR BLD AUTO: 26.9 % (ref 35–47)
HGB BLD-MCNC: 8.5 G/DL (ref 11.5–16)
IMM GRANULOCYTES # BLD AUTO: 0.02 K/UL (ref 0–0.04)
IMM GRANULOCYTES NFR BLD AUTO: 0.6 % (ref 0–0.5)
LYMPHOCYTES # BLD: 0.53 K/UL (ref 0.8–3.5)
LYMPHOCYTES NFR BLD: 14.9 % (ref 12–49)
MCH RBC QN AUTO: 28.1 PG (ref 26–34)
MCHC RBC AUTO-ENTMCNC: 31.6 G/DL (ref 30–36.5)
MCV RBC AUTO: 89.1 FL (ref 80–99)
MONOCYTES # BLD: 0.59 K/UL (ref 0–1)
MONOCYTES NFR BLD: 16.6 % (ref 5–13)
NEUTS SEG # BLD: 2.18 K/UL (ref 1.8–8)
NEUTS SEG NFR BLD: 61.4 % (ref 32–75)
NRBC # BLD: 0 K/UL (ref 0–0.01)
NRBC BLD-RTO: 0 PER 100 WBC
PLATELET # BLD AUTO: 237 K/UL (ref 150–400)
PMV BLD AUTO: 9 FL (ref 8.9–12.9)
POTASSIUM SERPL-SCNC: 4.2 MMOL/L (ref 3.5–5.1)
PROT SERPL-MCNC: 7.4 G/DL (ref 6.4–8.3)
RBC # BLD AUTO: 3.02 M/UL (ref 3.8–5.2)
SERVICE CMNT-IMP: ABNORMAL
SERVICE CMNT-IMP: ABNORMAL
SERVICE CMNT-IMP: NORMAL
SERVICE CMNT-IMP: NORMAL
SODIUM SERPL-SCNC: 132 MMOL/L (ref 136–145)
WBC # BLD AUTO: 3.6 K/UL (ref 3.6–11)

## 2025-08-19 PROCEDURE — 97535 SELF CARE MNGMENT TRAINING: CPT | Performed by: OCCUPATIONAL THERAPIST

## 2025-08-19 PROCEDURE — 1100000000 HC RM PRIVATE

## 2025-08-19 PROCEDURE — 80053 COMPREHEN METABOLIC PANEL: CPT

## 2025-08-19 PROCEDURE — 97116 GAIT TRAINING THERAPY: CPT

## 2025-08-19 PROCEDURE — 2500000003 HC RX 250 WO HCPCS

## 2025-08-19 PROCEDURE — 82962 GLUCOSE BLOOD TEST: CPT

## 2025-08-19 PROCEDURE — 97166 OT EVAL MOD COMPLEX 45 MIN: CPT | Performed by: OCCUPATIONAL THERAPIST

## 2025-08-19 PROCEDURE — 6370000000 HC RX 637 (ALT 250 FOR IP)

## 2025-08-19 PROCEDURE — 97161 PT EVAL LOW COMPLEX 20 MIN: CPT

## 2025-08-19 PROCEDURE — 85025 COMPLETE CBC W/AUTO DIFF WBC: CPT

## 2025-08-19 PROCEDURE — 36415 COLL VENOUS BLD VENIPUNCTURE: CPT

## 2025-08-19 PROCEDURE — 97530 THERAPEUTIC ACTIVITIES: CPT

## 2025-08-19 RX ORDER — LABETALOL HYDROCHLORIDE 5 MG/ML
10 INJECTION, SOLUTION INTRAVENOUS EVERY 4 HOURS PRN
Status: DISCONTINUED | OUTPATIENT
Start: 2025-08-19 | End: 2025-08-21 | Stop reason: HOSPADM

## 2025-08-19 RX ADMIN — SODIUM CHLORIDE, PRESERVATIVE FREE 10 ML: 5 INJECTION INTRAVENOUS at 09:36

## 2025-08-19 RX ADMIN — ACETAMINOPHEN 650 MG: 325 TABLET ORAL at 17:41

## 2025-08-19 RX ADMIN — OXCARBAZEPINE 300 MG: 300 TABLET, FILM COATED ORAL at 09:36

## 2025-08-19 RX ADMIN — VALSARTAN 80 MG: 80 TABLET, FILM COATED ORAL at 21:10

## 2025-08-19 RX ADMIN — VENLAFAXINE HYDROCHLORIDE 75 MG: 37.5 CAPSULE, EXTENDED RELEASE ORAL at 09:35

## 2025-08-19 RX ADMIN — PHENOBARBITAL 32.4 MG: 32.4 TABLET ORAL at 17:41

## 2025-08-19 RX ADMIN — MELATONIN 3 MG: at 21:09

## 2025-08-19 RX ADMIN — METOPROLOL SUCCINATE 25 MG: 25 TABLET, EXTENDED RELEASE ORAL at 09:36

## 2025-08-19 RX ADMIN — ATORVASTATIN CALCIUM 80 MG: 40 TABLET, FILM COATED ORAL at 21:10

## 2025-08-19 RX ADMIN — LEVOTHYROXINE SODIUM 75 MCG: 0.07 TABLET ORAL at 05:31

## 2025-08-19 RX ADMIN — FERROUS SULFATE TAB 325 MG (65 MG ELEMENTAL FE) 325 MG: 325 (65 FE) TAB at 09:36

## 2025-08-19 RX ADMIN — PHENOBARBITAL 32.4 MG: 32.4 TABLET ORAL at 21:09

## 2025-08-19 RX ADMIN — ASPIRIN 81 MG: 81 TABLET, CHEWABLE ORAL at 09:35

## 2025-08-19 RX ADMIN — PHENOBARBITAL 32.4 MG: 32.4 TABLET ORAL at 09:36

## 2025-08-19 RX ADMIN — OXCARBAZEPINE 300 MG: 300 TABLET, FILM COATED ORAL at 21:08

## 2025-08-19 RX ADMIN — GABAPENTIN 300 MG: 300 CAPSULE ORAL at 21:08

## 2025-08-19 RX ADMIN — FERROUS SULFATE TAB 325 MG (65 MG ELEMENTAL FE) 325 MG: 325 (65 FE) TAB at 17:41

## 2025-08-19 RX ADMIN — SODIUM CHLORIDE, PRESERVATIVE FREE 10 ML: 5 INJECTION INTRAVENOUS at 21:11

## 2025-08-19 RX ADMIN — OXYBUTYNIN CHLORIDE 5 MG: 5 TABLET, EXTENDED RELEASE ORAL at 09:36

## 2025-08-19 RX ADMIN — VALSARTAN 80 MG: 80 TABLET, FILM COATED ORAL at 09:36

## 2025-08-19 ASSESSMENT — PAIN DESCRIPTION - ORIENTATION
ORIENTATION: OTHER (COMMENT)
ORIENTATION: RIGHT

## 2025-08-19 ASSESSMENT — ENCOUNTER SYMPTOMS
GASTROINTESTINAL NEGATIVE: 1
ALLERGIC/IMMUNOLOGIC NEGATIVE: 1
EYES NEGATIVE: 1
RESPIRATORY NEGATIVE: 1

## 2025-08-19 ASSESSMENT — PAIN DESCRIPTION - LOCATION
LOCATION: OTHER (COMMENT)
LOCATION: HIP

## 2025-08-19 ASSESSMENT — PAIN SCALES - GENERAL
PAINLEVEL_OUTOF10: 0
PAINLEVEL_OUTOF10: 8
PAINLEVEL_OUTOF10: 0
PAINLEVEL_OUTOF10: 6
PAINLEVEL_OUTOF10: 4
PAINLEVEL_OUTOF10: 0

## 2025-08-19 ASSESSMENT — PAIN DESCRIPTION - DESCRIPTORS: DESCRIPTORS: ACHING

## 2025-08-19 ASSESSMENT — PAIN - FUNCTIONAL ASSESSMENT
PAIN_FUNCTIONAL_ASSESSMENT: 0-10
PAIN_FUNCTIONAL_ASSESSMENT: PREVENTS OR INTERFERES WITH MANY ACTIVE NOT PASSIVE ACTIVITIES
PAIN_FUNCTIONAL_ASSESSMENT: 0-10

## 2025-08-19 ASSESSMENT — PAIN DESCRIPTION - FREQUENCY: FREQUENCY: OTHER (COMMENT)

## 2025-08-20 ENCOUNTER — APPOINTMENT (OUTPATIENT)
Facility: HOSPITAL | Age: 75
DRG: 641 | End: 2025-08-20
Payer: MEDICARE

## 2025-08-20 LAB
ALBUMIN SERPL-MCNC: 3.4 G/DL (ref 3.5–5.2)
ALBUMIN/GLOB SERPL: 0.8 (ref 1.1–2.2)
ALP SERPL-CCNC: 89 U/L (ref 35–104)
ALT SERPL-CCNC: 10 U/L (ref 10–35)
ANION GAP SERPL CALC-SCNC: 10 MMOL/L (ref 2–14)
AST SERPL-CCNC: 33 U/L (ref 10–35)
BASOPHILS # BLD: 0.02 K/UL (ref 0–0.1)
BASOPHILS NFR BLD: 0.6 % (ref 0–1)
BILIRUB SERPL-MCNC: 0.4 MG/DL (ref 0–1.2)
BUN SERPL-MCNC: 10 MG/DL (ref 8–23)
BUN/CREAT SERPL: 20 (ref 12–20)
CALCIUM SERPL-MCNC: 9.3 MG/DL (ref 8.8–10.2)
CHLORIDE SERPL-SCNC: 98 MMOL/L (ref 98–107)
CO2 SERPL-SCNC: 25 MMOL/L (ref 20–29)
CREAT SERPL-MCNC: 0.51 MG/DL (ref 0.6–1)
DIFFERENTIAL METHOD BLD: ABNORMAL
EOSINOPHIL # BLD: 0.32 K/UL (ref 0–0.4)
EOSINOPHIL NFR BLD: 8.9 % (ref 0–7)
ERYTHROCYTE [DISTWIDTH] IN BLOOD BY AUTOMATED COUNT: 16.6 % (ref 11.5–14.5)
GLOBULIN SER CALC-MCNC: 4.2 G/DL (ref 2–4)
GLUCOSE BLD STRIP.AUTO-MCNC: 100 MG/DL (ref 65–117)
GLUCOSE BLD STRIP.AUTO-MCNC: 101 MG/DL (ref 65–117)
GLUCOSE BLD STRIP.AUTO-MCNC: 103 MG/DL (ref 65–117)
GLUCOSE BLD STRIP.AUTO-MCNC: 91 MG/DL (ref 65–117)
GLUCOSE SERPL-MCNC: 86 MG/DL (ref 65–100)
HCT VFR BLD AUTO: 26.9 % (ref 35–47)
HGB BLD-MCNC: 8.5 G/DL (ref 11.5–16)
IMM GRANULOCYTES # BLD AUTO: 0.02 K/UL (ref 0–0.04)
IMM GRANULOCYTES NFR BLD AUTO: 0.6 % (ref 0–0.5)
LYMPHOCYTES # BLD: 0.66 K/UL (ref 0.8–3.5)
LYMPHOCYTES NFR BLD: 18.3 % (ref 12–49)
MCH RBC QN AUTO: 28.7 PG (ref 26–34)
MCHC RBC AUTO-ENTMCNC: 31.6 G/DL (ref 30–36.5)
MCV RBC AUTO: 90.9 FL (ref 80–99)
MONOCYTES # BLD: 0.49 K/UL (ref 0–1)
MONOCYTES NFR BLD: 13.6 % (ref 5–13)
NEUTS SEG # BLD: 2.1 K/UL (ref 1.8–8)
NEUTS SEG NFR BLD: 58 % (ref 32–75)
NRBC # BLD: 0 K/UL (ref 0–0.01)
NRBC BLD-RTO: 0 PER 100 WBC
PLATELET # BLD AUTO: 228 K/UL (ref 150–400)
PMV BLD AUTO: 8.9 FL (ref 8.9–12.9)
POTASSIUM SERPL-SCNC: 4.1 MMOL/L (ref 3.5–5.1)
PROT SERPL-MCNC: 7.6 G/DL (ref 6.4–8.3)
RBC # BLD AUTO: 2.96 M/UL (ref 3.8–5.2)
SERVICE CMNT-IMP: NORMAL
SODIUM SERPL-SCNC: 132 MMOL/L (ref 136–145)
WBC # BLD AUTO: 3.6 K/UL (ref 3.6–11)

## 2025-08-20 PROCEDURE — 36415 COLL VENOUS BLD VENIPUNCTURE: CPT

## 2025-08-20 PROCEDURE — 6370000000 HC RX 637 (ALT 250 FOR IP)

## 2025-08-20 PROCEDURE — 82962 GLUCOSE BLOOD TEST: CPT

## 2025-08-20 PROCEDURE — 70551 MRI BRAIN STEM W/O DYE: CPT

## 2025-08-20 PROCEDURE — 85025 COMPLETE CBC W/AUTO DIFF WBC: CPT

## 2025-08-20 PROCEDURE — 1100000000 HC RM PRIVATE

## 2025-08-20 PROCEDURE — 2500000003 HC RX 250 WO HCPCS

## 2025-08-20 PROCEDURE — 80053 COMPREHEN METABOLIC PANEL: CPT

## 2025-08-20 RX ORDER — LIDOCAINE 4 G/G
1 PATCH TOPICAL DAILY
Status: DISCONTINUED | OUTPATIENT
Start: 2025-08-20 | End: 2025-08-21 | Stop reason: HOSPADM

## 2025-08-20 RX ADMIN — OXYBUTYNIN CHLORIDE 5 MG: 5 TABLET, EXTENDED RELEASE ORAL at 09:34

## 2025-08-20 RX ADMIN — VALSARTAN 80 MG: 80 TABLET, FILM COATED ORAL at 09:34

## 2025-08-20 RX ADMIN — METOPROLOL SUCCINATE 25 MG: 25 TABLET, EXTENDED RELEASE ORAL at 09:35

## 2025-08-20 RX ADMIN — MELATONIN 3 MG: at 21:07

## 2025-08-20 RX ADMIN — SODIUM CHLORIDE, PRESERVATIVE FREE 10 ML: 5 INJECTION INTRAVENOUS at 21:11

## 2025-08-20 RX ADMIN — PHENOBARBITAL 16.2 MG: 32.4 TABLET ORAL at 21:08

## 2025-08-20 RX ADMIN — PHENOBARBITAL 32.4 MG: 32.4 TABLET ORAL at 09:34

## 2025-08-20 RX ADMIN — FERROUS SULFATE TAB 325 MG (65 MG ELEMENTAL FE) 325 MG: 325 (65 FE) TAB at 09:34

## 2025-08-20 RX ADMIN — ACETAMINOPHEN 650 MG: 325 TABLET ORAL at 16:55

## 2025-08-20 RX ADMIN — ASPIRIN 81 MG: 81 TABLET, CHEWABLE ORAL at 09:35

## 2025-08-20 RX ADMIN — OXCARBAZEPINE 300 MG: 300 TABLET, FILM COATED ORAL at 09:35

## 2025-08-20 RX ADMIN — VALSARTAN 80 MG: 80 TABLET, FILM COATED ORAL at 21:08

## 2025-08-20 RX ADMIN — OXCARBAZEPINE 300 MG: 300 TABLET, FILM COATED ORAL at 21:08

## 2025-08-20 RX ADMIN — FERROUS SULFATE TAB 325 MG (65 MG ELEMENTAL FE) 325 MG: 325 (65 FE) TAB at 16:57

## 2025-08-20 RX ADMIN — VENLAFAXINE HYDROCHLORIDE 75 MG: 37.5 CAPSULE, EXTENDED RELEASE ORAL at 09:34

## 2025-08-20 RX ADMIN — ATORVASTATIN CALCIUM 80 MG: 40 TABLET, FILM COATED ORAL at 21:08

## 2025-08-20 RX ADMIN — LEVOTHYROXINE SODIUM 75 MCG: 0.07 TABLET ORAL at 05:02

## 2025-08-20 RX ADMIN — GABAPENTIN 300 MG: 300 CAPSULE ORAL at 16:55

## 2025-08-20 ASSESSMENT — PAIN DESCRIPTION - LOCATION: LOCATION: LEG;BACK

## 2025-08-20 ASSESSMENT — PAIN SCALES - GENERAL
PAINLEVEL_OUTOF10: 5
PAINLEVEL_OUTOF10: 0
PAINLEVEL_OUTOF10: 0

## 2025-08-20 ASSESSMENT — PAIN DESCRIPTION - ORIENTATION: ORIENTATION: RIGHT

## 2025-08-20 ASSESSMENT — PAIN DESCRIPTION - DESCRIPTORS: DESCRIPTORS: ACHING

## 2025-08-21 VITALS
DIASTOLIC BLOOD PRESSURE: 60 MMHG | TEMPERATURE: 97.9 F | RESPIRATION RATE: 17 BRPM | HEIGHT: 69 IN | OXYGEN SATURATION: 98 % | SYSTOLIC BLOOD PRESSURE: 168 MMHG | WEIGHT: 192.46 LBS | HEART RATE: 61 BPM | BODY MASS INDEX: 28.51 KG/M2

## 2025-08-21 LAB
ALBUMIN SERPL-MCNC: 3.6 G/DL (ref 3.5–5.2)
ALBUMIN/GLOB SERPL: 0.9 (ref 1.1–2.2)
ALP SERPL-CCNC: 88 U/L (ref 35–104)
ALT SERPL-CCNC: 12 U/L (ref 10–35)
ANION GAP SERPL CALC-SCNC: 13 MMOL/L (ref 2–14)
AST SERPL-CCNC: 36 U/L (ref 10–35)
BILIRUB SERPL-MCNC: 0.6 MG/DL (ref 0–1.2)
BUN SERPL-MCNC: 9 MG/DL (ref 8–23)
BUN/CREAT SERPL: 17 (ref 12–20)
CALCIUM SERPL-MCNC: 9.4 MG/DL (ref 8.8–10.2)
CHLORIDE SERPL-SCNC: 95 MMOL/L (ref 98–107)
CO2 SERPL-SCNC: 24 MMOL/L (ref 20–29)
CREAT SERPL-MCNC: 0.54 MG/DL (ref 0.6–1)
GLOBULIN SER CALC-MCNC: 4.1 G/DL (ref 2–4)
GLUCOSE BLD STRIP.AUTO-MCNC: 101 MG/DL (ref 65–117)
GLUCOSE BLD STRIP.AUTO-MCNC: 89 MG/DL (ref 65–117)
GLUCOSE SERPL-MCNC: 85 MG/DL (ref 65–100)
POTASSIUM SERPL-SCNC: 3.7 MMOL/L (ref 3.5–5.1)
PROT SERPL-MCNC: 7.7 G/DL (ref 6.4–8.3)
SERVICE CMNT-IMP: NORMAL
SERVICE CMNT-IMP: NORMAL
SODIUM SERPL-SCNC: 131 MMOL/L (ref 136–145)

## 2025-08-21 PROCEDURE — 6370000000 HC RX 637 (ALT 250 FOR IP)

## 2025-08-21 PROCEDURE — 2580000003 HC RX 258: Performed by: INTERNAL MEDICINE

## 2025-08-21 PROCEDURE — 36415 COLL VENOUS BLD VENIPUNCTURE: CPT

## 2025-08-21 PROCEDURE — 82962 GLUCOSE BLOOD TEST: CPT

## 2025-08-21 PROCEDURE — 2500000003 HC RX 250 WO HCPCS

## 2025-08-21 PROCEDURE — 80053 COMPREHEN METABOLIC PANEL: CPT

## 2025-08-21 RX ORDER — LIDOCAINE 4 G/G
1 PATCH TOPICAL DAILY
Qty: 30 EACH | Refills: 0 | Status: SHIPPED | OUTPATIENT
Start: 2025-08-22

## 2025-08-21 RX ORDER — SODIUM CHLORIDE 9 MG/ML
INJECTION, SOLUTION INTRAVENOUS CONTINUOUS
Status: DISCONTINUED | OUTPATIENT
Start: 2025-08-21 | End: 2025-08-21 | Stop reason: HOSPADM

## 2025-08-21 RX ORDER — ATORVASTATIN CALCIUM 80 MG/1
80 TABLET, FILM COATED ORAL NIGHTLY
Qty: 30 TABLET | Refills: 3 | Status: SHIPPED | OUTPATIENT
Start: 2025-08-21

## 2025-08-21 RX ADMIN — VENLAFAXINE HYDROCHLORIDE 75 MG: 37.5 CAPSULE, EXTENDED RELEASE ORAL at 10:23

## 2025-08-21 RX ADMIN — OXYBUTYNIN CHLORIDE 5 MG: 5 TABLET, EXTENDED RELEASE ORAL at 10:23

## 2025-08-21 RX ADMIN — SODIUM CHLORIDE, PRESERVATIVE FREE 10 ML: 5 INJECTION INTRAVENOUS at 08:30

## 2025-08-21 RX ADMIN — VALSARTAN 80 MG: 80 TABLET, FILM COATED ORAL at 10:22

## 2025-08-21 RX ADMIN — ASPIRIN 81 MG: 81 TABLET, CHEWABLE ORAL at 10:23

## 2025-08-21 RX ADMIN — METOPROLOL SUCCINATE 25 MG: 25 TABLET, EXTENDED RELEASE ORAL at 10:23

## 2025-08-21 RX ADMIN — FERROUS SULFATE TAB 325 MG (65 MG ELEMENTAL FE) 325 MG: 325 (65 FE) TAB at 10:22

## 2025-08-21 RX ADMIN — OXCARBAZEPINE 300 MG: 300 TABLET, FILM COATED ORAL at 10:22

## 2025-08-21 RX ADMIN — LEVOTHYROXINE SODIUM 75 MCG: 0.07 TABLET ORAL at 05:08

## 2025-08-21 RX ADMIN — PHENOBARBITAL 16.2 MG: 32.4 TABLET ORAL at 10:22

## 2025-08-21 RX ADMIN — SODIUM CHLORIDE: 0.9 INJECTION, SOLUTION INTRAVENOUS at 11:39

## 2025-08-21 ASSESSMENT — PAIN SCALES - GENERAL: PAINLEVEL_OUTOF10: 0

## (undated) DEVICE — CABLE REPROC RPRCSS 20 POLE A/20 POLE B LGHT BL 34PN DARK BL 34PN C

## (undated) DEVICE — 3M™ TEGADERM™ TRANSPARENT FILM DRESSING FRAME STYLE, 1626W, 4 IN X 4-3/4 IN (10 CM X 12 CM), 50/CT 4CT/CASE: Brand: 3M™ TEGADERM™

## (undated) DEVICE — THE ESOPHAGEAL COOLING DEVICE IS A THERMAL REGULATING DEVICE, INTENDED TO CONNECT TO A CINCINNATI SUB-ZERO BLANKETROL II OR BLANKETROL III HYPER-HYPOTHERMIA SYSTEM TO CONTROL PATIENT TEMPERATURE, AND PROVIDE GASTRIC DECOMPRESSION AND SUCTIONING.: Brand: ESOPHAGEAL COOLING DEVICE

## (undated) DEVICE — CATHETER EP 7FR L115CM 2-8-2MM SPC TIP 2MM 10 ELECTRD F L

## (undated) DEVICE — PINNACLE INTRODUCER SHEATH: Brand: PINNACLE

## (undated) DEVICE — ORISE PROKNIFE 1.5 MM ELECTRODE: Brand: ORISE™ PROKNIFE

## (undated) DEVICE — COVER CATH ACUNAV ULTRASOUND 5X72IN ANTI STATIC

## (undated) DEVICE — SUPPLEMENT DIGESTIVE H2O SOL GI-EASE

## (undated) DEVICE — BEAMER ARGON PROBE SINGLE MODE PROBE FOR FLEXIBLE ENDOSCOPY 2.3 MM X 320 CM: Brand: BEAMER

## (undated) DEVICE — 1 X VERSACROSS TRANSSEPTAL SHEATH (INCLUDING  1 X J-TIP GUIDEWIRE); 1 X VERSACROSS RF WIRE (INCLUDING 1 X CONNECTOR CABLE (SINGLE USE)); 1 X DISPERSIVE ELECTRODE: Brand: VERSACROSS ACCESS SOLUTION

## (undated) DEVICE — PRESSURE MONITORING SET: Brand: TRUWAVE

## (undated) DEVICE — FORCEPS BX L240CM JAW DIA2.2MM RAD JAW 4 HOT DISP

## (undated) DEVICE — TOWEL,OR,DSP,ST,BLUE,STD,4/PK,20PK/CS: Brand: MEDLINE

## (undated) DEVICE — CONNECTOR ELECSURG ARCONNECT AR PRB SGL USE DISP

## (undated) DEVICE — CABLE CATHETER EXTENSION

## (undated) DEVICE — ORISE PROKNIFE 3.0 MM ELECTRODE: Brand: ORISE™ PROKNIFE

## (undated) DEVICE — WASTE KIT - ST MARY: Brand: MEDLINE INDUSTRIES, INC.

## (undated) DEVICE — PERCLOSE PROGLIDE™ SUTURE-MEDIATED CLOSURE SYSTEM: Brand: PERCLOSE PROGLIDE™

## (undated) DEVICE — TUBING PMP FOR CARTO SYS SMARTABLATE

## (undated) DEVICE — PATCH REF EXT FOR CARTO 3 SYS (EA = 6 PACKS)

## (undated) DEVICE — INTRODUCER SHTH 11FR CANN L23CM DIL TIP 45MM YEL W/O MINI

## (undated) DEVICE — CATHETER MAP D CRV 3-3-3-3-3 MM SPC GALAXY OCTARAY

## (undated) DEVICE — SYRINGE MED 50ML LUERLOCK TIP

## (undated) DEVICE — PADPRO DEFIBRILLATION/PACING/CARDIOVERSION/MONITORING ELECTRODES, ADULT/CHILD GREATER THAN 10 KG RADIOTRANSPARENT ELECTRODE, PHYSIO-CONTROL QUIK-COMBO (M) 60" (152 CM): Brand: PADPRO

## (undated) DEVICE — STOPCOCK 3 W OFF HNDL NYL

## (undated) DEVICE — SHEATH INTRO 8.5FR L71CM 8.5FR DIL GWIRE L180CM DIA0.032IN

## (undated) DEVICE — CATHETER ABLATION QDOT MICRO DF CURVE BIDIRECTIONAL THERMOCOUPLE

## (undated) DEVICE — PROVE COVER: Brand: UNBRANDED

## (undated) DEVICE — CATHETER REPROC ULTRASOUND 8 FRX90 CM ACUSON ACUNAV

## (undated) DEVICE — Device: Brand: PROTRACK PIGTAIL WIRE

## (undated) DEVICE — CATHETER ETER DIAG L110CM OD6FR VASC PGTL W SIDE H COR W OUT

## (undated) DEVICE — ELECTRODE,RADIOTRANSLUCENT,FOAM,5PK: Brand: MEDLINE

## (undated) DEVICE — ROYAL SILK SURGICAL GOWN, XXL: Brand: CONVERTORS

## (undated) DEVICE — ACCESS SHEATH WITH DILATOR: Brand: WATCHMAN FXD CURVE™ ACCESS SYSTEM

## (undated) DEVICE — CAPSULE ENDOSCP L26.2MM DIA11.4MM BIOCOMPATIBLE PLAS SB 3